# Patient Record
Sex: FEMALE | Race: OTHER | Employment: FULL TIME | URBAN - METROPOLITAN AREA
[De-identification: names, ages, dates, MRNs, and addresses within clinical notes are randomized per-mention and may not be internally consistent; named-entity substitution may affect disease eponyms.]

---

## 2017-01-23 ENCOUNTER — TELEPHONE (OUTPATIENT)
Dept: FAMILY MEDICINE CLINIC | Age: 52
End: 2017-01-23

## 2017-01-23 NOTE — TELEPHONE ENCOUNTER
Pt scheduled 2/28/17    Pt states that she has felt a little down/depressed and decided to look at the s/e of the metformin thinking that it is was coming from the medication. When she started reading the s/e she saw where it said that if a pt starts with cold arms and legs that they need to go straight to the ER for eval b/c it could be life threatening. She said that she started the metformin on 12/24 and the cold arms/legs started about 2 wks ago. She states that she is usually very warm and her arms and legs are even cold to the touch. She doesn't want to go to the ER if not needed and wanted to check with Dr Richard Rodriguez first.  Advised that I will check with Dr Richard Rodriguez and get back with her. She denies other sx's. That would be a rare and unusual side effect of the Metformin (the depression and the cold intolerance). But it would certainly be ok for her to stop it for a few weeks and see if she notices any improvement. Make sure not suicidal. She can stay off it until her f/u on 2-28-17. But if her sx worsen or persist prior to that, see me sooner     I called pt back to let her know of Dr Richard Rodriguez rec. She states that she will give it a try.   She denies being suicidal.

## 2017-02-28 ENCOUNTER — OFFICE VISIT (OUTPATIENT)
Dept: FAMILY MEDICINE CLINIC | Age: 52
End: 2017-02-28

## 2017-02-28 VITALS
RESPIRATION RATE: 18 BRPM | WEIGHT: 221.6 LBS | HEART RATE: 65 BPM | HEIGHT: 69 IN | DIASTOLIC BLOOD PRESSURE: 86 MMHG | TEMPERATURE: 98.9 F | SYSTOLIC BLOOD PRESSURE: 130 MMHG | BODY MASS INDEX: 32.82 KG/M2 | OXYGEN SATURATION: 99 %

## 2017-02-28 DIAGNOSIS — G47.30 SLEEP APNEA, UNSPECIFIED TYPE: ICD-10-CM

## 2017-02-28 DIAGNOSIS — R03.0 BORDERLINE HYPERTENSION: ICD-10-CM

## 2017-02-28 DIAGNOSIS — E78.00 HYPERCHOLESTEROLEMIA: ICD-10-CM

## 2017-02-28 DIAGNOSIS — E55.9 VITAMIN D DEFICIENCY: ICD-10-CM

## 2017-02-28 DIAGNOSIS — E11.9 DIABETES MELLITUS TYPE 2, NONINSULIN DEPENDENT (HCC): Primary | ICD-10-CM

## 2017-02-28 RX ORDER — GLUCOSAMINE SULFATE 1500 MG
2000 POWDER IN PACKET (EA) ORAL DAILY
COMMUNITY
End: 2018-03-14

## 2017-02-28 NOTE — PROGRESS NOTES
HISTORY OF PRESENT ILLNESS  Connor Ibrahim is a 47 y.o. female. HPI  Fasting follow up diabetes, cholesterol, mild hypertension and lab check. Since her last visit here, she has met w/ a dietician twice and is now on reduced sugar, low carb diet w/ increased lean protein and fiber. She has also restarted exercising regularly by walking ~ 5 x a week. Her wt is down ~ 20 lbs and already she is feeling much better. She tried taking Metformin but it made her feel extremely depressed and weak. She felt miserable. As soon as she stopped it she felt remarkably better. She has not started checking her BS yet but would be interested in doing so. Also she received a notice from the surgical center that during her recent ortho surgery she was noticed to have sleep apnea during anesthesia. It was suggested that she follow up w/ me to be referred for sleep apnea evaluation. She reports that her ex  used to tell her years ago that she snored a lot. She snores less now than she used to but still does at times. She woke herself up once snoring so loudly and awoke one day on the sofa gasping. She usually feels tired most days. Many mornings she awakens feeling tired and unrefreshed. Review of Systems   Respiratory: Negative. Cardiovascular: Negative. Gastrointestinal: Negative. Neurological: Negative. Endo/Heme/Allergies: Negative.       Problem List  Date Reviewed: 2/28/2017          Codes Class Noted    Diabetes mellitus type 2, noninsulin dependent ([facility].) ICD-10-CM: E11.9  ICD-9-CM: 250.00  12/27/2016    Overview Signed 12/27/2016  2:21 PM by Byron Melchor MD     11-28-16             Tear of medial meniscus of left knee ICD-10-CM: I52.691E  ICD-9-CM: 836.0  11/28/2016    Overview Signed 11/28/2016 10:42 AM by Byron Melchor MD     ~ Spring 2016, Ortho Dr Edwar Samano, scope scheduled 12-13-16             Mild Hypercholesterolemia with good HDL ICD-10-CM: E78.00  ICD-9-CM: 272.0 2016        Borderline hypertension ICD-10-CM: R03.0  ICD-9-CM: 796.2  2016        Screening exams for skin cancer ICD-10-CM: Z12.83  ICD-9-CM: V76.43  2016    Overview Signed 2016  2:38 PM by Brianna Carrasco MD     Derm Dr Quintana             Right carpal tunnel syndrome ICD-10-CM: G56.01  ICD-9-CM: 354.0  2015    Overview Addendum 2015 11:04 AM by Brianna Carrasco MD     ~ 2013 Dr Sharon Rao & Dr Shiloh Gallego: EMG study + Impression:  The above electrodiagnostic study reveals evidence of mildly prolonged motor and sensory distal latency of right median nerve, suggesting mild median nerve entrapment at wrist.             Vertigo ICD-10-CM: R42  ICD-9-CM: 780.4  6/3/2013        Vitamin D deficiency ICD-10-CM: E55.9  ICD-9-CM: 268.9  2012    Overview Signed 2012  1:42 PM by Brianna Carrasco MD     2012             History of low back pain, DDD, mild scoliosis and mild-moderate disc protrusions ICD-10-CM: Z87.39  ICD-9-CM: V13.59  2012        Mild-moderate lumbar disc protrusions L4-5, L5-S1, mild scoliosis ICD-10-CM: M51.26  ICD-9-CM: 722.10  2012    Overview Signed 2012  3:01 PM by Brianna Carrasco MD     2007             Impaired fasting glucose ICD-10-CM: R73.01  ICD-9-CM: 790.21  8/15/2011        Abnormal Pap smear s/p Cryotherapy ~  ICD-10-CM: RCQ2775  ICD-9-CM: TXN2659  2010    Overview Signed 2010  8:53 AM by Brianna Carrasco MD     GYN Dr Miranda Bacon (normal spontaneous vaginal delivery) ICD-10-CM: O80  ICD-9-CM: 694  Unknown    Overview Signed 3/22/2010 10:24 AM by Brianna Carrasco MD     A2,  x2, SAB x2             Achilles tendonitis ICD-10-CM: M76.60  ICD-9-CM: 726.71  3/1/2010    Overview Signed 3/1/2010 11:14 AM by Louis GERARDO>R s/p cortisone             Anxiety ICD-10-CM: F41.9  ICD-9-CM: 300.00  3/1/2010    Overview Signed 3/1/2010 11:14 AM by Louis horn H/O Abnormal Pap Smears 1980, 1998 ICD-9-CM: 796.9  3/1/2010    Overview Signed 3/1/2010 11:17 AM by Waylon Fothergill Desantis     3342'J and 1998 (no Bx)             Congenital hip dysplasia ICD-10-CM: Q65.89  ICD-9-CM: 755.63  3/1/2010    Overview Signed 3/1/2010 11:18 AM by Waylon Fothergill Desantis     Left.  Recontruction, bone grafting 1969             Mononucleosis ICD-10-CM: B27.90  ICD-9-CM: 818  9/1/2009    Overview Signed 5/8/2012  3:01 PM by Eduar Lopez MD     Diagnosed [facility], 9/2009             Strep throat ICD-10-CM: J02.0  ICD-9-CM: 034.0  9/1/2009        Scoliosis ICD-10-CM: M41.9  ICD-9-CM: 737.30  8/1/2007    Overview Signed 3/1/2010 11:15 AM by Waylon Fothergill Desantis     mild             Hip pain ICD-10-CM: M25.559  ICD-9-CM: 719.45  8/1/2007    Overview Signed 3/1/2010 11:15 AM by Waylon Fothergill Desantis     right             S/P Surgery for Right Lateral Epicondylitis, 2005 ICD-10-CM: [id number]  ICD-9-CM: 726.32  1/1/2005    Overview Addendum 5/8/2012  3:03 PM by Eduar Lopez MD     TOA             S/P LASIK surgery ICD-10-CM: X76.726  ICD-9-CM: V45.69  1/1/2005        GERD (gastroesophageal reflux disease) ICD-10-CM: K21.9  ICD-9-CM: 530.81  1/1/2003    Overview Addendum 5/8/2012  3:01 PM by Eduar Lopez MD     [place]                 Past Surgical History:   Procedure Laterality Date    CRYOCAUTERY OF CERVIX  ~11/2008    Dr Mary Marquez    right 3rd finger tendon rupture repair    HX COLONOSCOPY  4/19/2016, Dr Marina Michele    Polyp: tubular adenoma, repeat 3 yrs    HX KNEE ARTHROSCOPY Left 12/13/2016    related to torn meniscus    HX PARTIAL HYSTERECTOMY  4/11/13    Dr Holley Daniel    congenital hip dysplasia, left hip reconstruction and bone grafting    REPAIR ACHILLES TENDON,PRIMARY  10/09    R Achilles; Dr. Ihsan Bolaños  2005 right lateral epicondylitis surgery; TOA       OB History      Para Term  AB TAB SAB Ectopic Multiple Living    4 2   2  2           Obstetric Comments     x 2; SAB x 2; Gyn Dr Aime Quintana        Current Outpatient Prescriptions   Medication Sig    cholecalciferol (VITAMIN D3) 1,000 unit cap Take 2,000 Units by mouth daily. Indications: takes 2 tabs QD    polyethylene glycol (MIRALAX) 17 gram/dose powder MIX AND DRINK 17G BY MOUTH DAILY     No current facility-administered medications for this visit.       Allergies   Allergen Reactions    Metformin Other (comments)     Severe depression and coldness in arms    Prednisone Other (comments)     SOB, flushed, red rash     Social History     Social History    Marital status:      Spouse name: N/A    Number of children: 2    Years of education: N/A     Occupational History    Speech Pathologist for [employer]      Social History Main Topics    Smoking status: Never Smoker    Smokeless tobacco: Never Used    Alcohol use 0.0 oz/week     0 Standard drinks or equivalent per week      Comment: maybe 2-3 drinks of wine/cocktails per year    Drug use: No    Sexual activity: Yes     Partners: Male     Birth control/ protection: Surgical      Comment: Hysterectomy      Other Topics Concern    Caffeine Concern No     1 cup of coffee a day maybe 4-5 days a week    Weight Concern Yes     happy her wt is coming down since better diet and resuming regular exercise: down from 244 to 221    Special Diet Yes     met w/ dietician twice since : doing high protein, low fat, increased fiber and veggies, low carbs, reduced sugars    Exercise Yes     5 x a week: walks 1-2 hrs     Social History Narrative    Native of [place]; moved [place] [facility] to [place] ;  ~; Got re- 2012    Recalls getting part of her Hepatitis B series in the , not sure if she got #3     Family History   Problem Relation Age of Onset    Arrhythmia Mother     Bipolar Disorder Mother     Cancer Mother      cervical dx in her late 29's   [facility] High Cholesterol Mother     Heart Disease Mother      heart arrhytmia    Other Mother      brain tumor diagnosed at age 67, stable    Diabetes Father      obesity    Arthritis-osteo Father      ? RA as well    Cancer Father      skin cancer    Gout Father     Heart Disease Maternal Grandmother      CHF in her 63's    Heart Disease Maternal Grandfather       81yo    Hypertension Maternal Grandfather     Diabetes Paternal Grandmother     Stroke Paternal Grandmother       in her [de-identified]    Arthritis-rheumatoid Paternal Grandmother     Cancer Paternal Grandfather      liver and colon CA,  mid 66's    Diabetes Paternal Grandfather     Depression Sister      2 yrs younger than pt    Anxiety Sister     Other Sister      scoliosis    Other Daughter      Tourettes, tremor, tics, milk/soy allergy, lactose intolerance    Other Daughter      Tourettes, anxiety    Breast Cancer Paternal Aunt      dx in her 29's    Diabetes Paternal Aunt      type 2    Diabetes Paternal Uncle      type 2    Arthritis-rheumatoid Paternal Aunt     Gout Paternal Aunt      Visit Vitals    /86 (BP 1 Location: Left arm, BP Patient Position: Sitting)    Pulse 65    Temp 98.9 °F (37.2 °C) (Oral)    Resp 18    Ht 5' 8.5\" (1.74 m)    Wt 221 lb 9.6 oz (100.5 kg)    LMP 2013    SpO2 99%    BMI 33.2 kg/m2     Physical Exam   Constitutional: No distress. HENT:   Mouth/Throat: Uvula is midline and oropharynx is clear and moist. No uvula swelling. No oropharyngeal exudate. Neck: Neck supple. Cardiovascular: Normal rate, regular rhythm and normal heart sounds. No murmur heard. Pulmonary/Chest: Effort normal and breath sounds normal. No respiratory distress. Abdominal: Soft. Bowel sounds are normal. She exhibits no distension and no mass. There is no tenderness.    Musculoskeletal: She exhibits no edema or tenderness. Neurological:   Normal monofilament testing B feet. Sensation and pulses intact. Skin intact. Vitals reviewed. ASSESSMENT and PLAN    ICD-10-CM ICD-9-CM    1. Diabetes mellitus type 2, noninsulin dependent (HCC) E11.9 250.00  DIABETES FOOT EXAM       DIABETES EYE EXAM      MICROALBUMIN, UR, RAND W/ MICROALBUMIN/CREA RATIO      METABOLIC PANEL, BASIC      HEMOGLOBIN A1C WITH EAG   2. Mild Hypercholesterolemia with good HDL E78.00 272.0 ALT      AST      LIPID PANEL   3. Borderline hypertension R03.0 796.2    4. Vitamin D deficiency E55.9 268.9 VITAMIN D, 25 HYDROXY   5. Sleep apnea, unspecified type G47.30 780.57 REFERRAL TO SLEEP STUDIES     Fasting labs today    Reviewed diet, exercise and weight control; Since her last visit here, she has met w/ a dietician twice and is now on reduced sugar, low carb diet w/ increased lean protein and fiber. She has also restarted exercising regularly by walking ~ 5 x a week. Her wt is down ~ 20 lbs and already she is feeling much better. Commended on her progress, encouraged to keep up the good work  She tried taking Metformin but it made her feel extremely depressed and weak. She felt miserable. As soon as she stopped it she felt remarkably better. But she is doing well w/ lifestyle modification on her own so far anyway. Cardiovascular risk and specific lipid/LDL/BS/HgbA1c/BP goals reviewed    Rx given for Glucometer, test strips, and lancets.  Goal BS's d/w pt    Further follow up & other recommendations pending review of labs as well

## 2017-02-28 NOTE — PROGRESS NOTES
Chief Complaint   Patient presents with    Hypertension     fasting follow up    Diabetes    Cholesterol Problem     1. Have you been to the ER, urgent care clinic since your last visit? Hospitalized since your last visit? No    2. Have you seen or consulted any other health care providers outside of the [address] [place] [address] since your last visit? Include any pap smears or colon screening.    Dr Aileen Hatch

## 2017-02-28 NOTE — PATIENT INSTRUCTIONS
Diabetes Foot Health: Care Instructions  Your Care Instructions    When you have diabetes, your feet need extra care and attention. Diabetes can damage the nerve endings and blood vessels in your feet, making you less likely to notice when your feet are injured. Diabetes also limits your body's ability to fight infection and get blood to areas that need it. If you get a minor foot injury, it could become an ulcer or a serious infection. With good foot care, you can prevent most of these problems. Caring for your feet can be quick and easy. Most of the care can be done when you are bathing or getting ready for bed. Follow-up care is a key part of your treatment and safety. Be sure to make and go to all appointments, and call your doctor if you are having problems. Its also a good idea to know your test results and keep a list of the medicines you take. How can you care for yourself at home? · Keep your blood sugar close to normal by watching what and how much you eat, monitoring blood sugar, taking medicines if prescribed, and getting regular exercise. · Do not smoke. Smoking affects blood flow and can make foot problems worse. If you need help quitting, talk to your doctor about stop-smoking programs and medicines. These can increase your chances of quitting for good. · Eat a diet that is low in fats. High fat intake can cause fat to build up in your blood vessels and decrease blood flow. · Inspect your feet daily for blisters, cuts, cracks, or sores. If you cannot see well, use a mirror or have someone help you. · Take care of your feet:  [employer] [facility] [employer] your feet every day. Use warm (not hot) water. Check the water temperature with your wrists or other part of your body, not your feet. ¨ Dry your feet well. Pat them dry. Do not rub the skin on your feet too hard. Dry well between your toes. If the skin on your feet stays moist, bacteria or a fungus can grow, which can lead to infection.   ¨ Keep your skin soft. Use moisturizing skin cream to keep the skin on your feet soft and prevent calluses and cracks. But do not put the cream between your toes, and stop using any cream that causes a rash. ¨ Clean underneath your toenails carefully. Do not use a sharp object to clean underneath your toenails. Use the blunt end of a nail file or other rounded tool. ¨ Trim and file your toenails straight across to prevent ingrown toenails. Use a nail clipper, not scissors. Use an emery board to smooth the edges. · Change socks daily. Socks without seams are best, because seams often rub the feet. You can find socks for people with diabetes from specialty catalogs. · Look inside your shoes every day for things like gravel or torn linings, which could cause blisters or sores. · Buy shoes that fit well:  ¨ Look for shoes that have plenty of space around the toes. This helps prevent bunions and blisters. ¨ Try on shoes while wearing the kind of socks you will usually wear with the shoes. ¨ Avoid plastic shoes. They may rub your feet and cause blisters. Good shoes should be made of materials that are flexible and breathable, such as leather or cloth. ¨ Break in new shoes slowly by wearing them for no more than an hour a day for several days. Take extra time to check your feet for red areas, blisters, or other problems after you wear new shoes. · Do not go barefoot. Do not wear sandals, and do not wear shoes with very thin soles. Thin soles are easy to puncture. They also do not protect your feet from hot pavement or cold weather. · Have your doctor check your feet during each visit. If you have a foot problem, see your doctor. Do not try to treat an early foot problem at home. Home remedies or treatments that you can buy without a prescription (such as corn removers) can be harmful. · Always get early treatment for foot problems. A minor irritation can lead to a major problem if not properly cared for early.   When should you call for help? Call your doctor now or seek immediate medical care if:  · You have a foot sore, an ulcer or break in the skin that is not healing after 4 days, bleeding corns or calluses, or an ingrown toenail. · You have blue or black areas, which can mean bruising or blood flow problems. · You have peeling skin or tiny blisters between your toes or cracking or oozing of the skin. · You have a fever for more than 24 hours and a foot sore. · You have new numbness or tingling in your feet that does not go away after you move your feet or change positions. · You have unexplained or unusual swelling of the foot or ankle. Watch closely for changes in your health, and be sure to contact your doctor if:  · You cannot do proper foot care. Where can you learn more? Go to http://demi-kellie.info/. Enter A739 in the search box to learn more about \"Diabetes Foot Health: Care Instructions. \"  Current as of: May 23, 2016  Content Version: 11.1  © 2374-5826 [employer]. Care instructions adapted under license by [employer] (which disclaims liability or warranty for this information). If you have questions about a medical condition or this instruction, always ask your healthcare professional. [address] any warranty or liability for your use of this information.

## 2017-03-01 LAB
25(OH)D3+25(OH)D2 SERPL-MCNC: 37.7 NG/ML (ref 30–100)
ALBUMIN/CREAT UR: <4.3 MG/G CREAT (ref 0–30)
ALT SERPL-CCNC: 17 IU/L (ref 0–32)
AST SERPL-CCNC: 17 IU/L (ref 0–40)
BUN SERPL-MCNC: 17 MG/DL (ref 6–24)
BUN/CREAT SERPL: 16 (ref 9–23)
CALCIUM SERPL-MCNC: 10.1 MG/DL (ref 8.7–10.2)
CHLORIDE SERPL-SCNC: 100 MMOL/L (ref 96–106)
CHOLEST SERPL-MCNC: 196 MG/DL (ref 100–199)
CO2 SERPL-SCNC: 24 MMOL/L (ref 18–29)
CREAT SERPL-MCNC: 1.05 MG/DL (ref 0.57–1)
CREAT UR-MCNC: 69.6 MG/DL
EST. AVERAGE GLUCOSE BLD GHB EST-MCNC: 128 MG/DL
GLUCOSE SERPL-MCNC: 100 MG/DL (ref 65–99)
HBA1C MFR BLD: 6.1 % (ref 4.8–5.6)
HDLC SERPL-MCNC: 67 MG/DL
INTERPRETATION, 910389: NORMAL
LDLC SERPL CALC-MCNC: 109 MG/DL (ref 0–99)
MICROALBUMIN UR-MCNC: <3 UG/ML
POTASSIUM SERPL-SCNC: 4.6 MMOL/L (ref 3.5–5.2)
SODIUM SERPL-SCNC: 140 MMOL/L (ref 134–144)
TRIGL SERPL-MCNC: 101 MG/DL (ref 0–149)
VLDLC SERPL CALC-MCNC: 20 MG/DL (ref 5–40)

## 2017-03-06 ENCOUNTER — HOSPITAL ENCOUNTER (OUTPATIENT)
Dept: NUTRITION | Age: 52
Discharge: HOME | End: 2017-03-06
Payer: COMMERCIAL

## 2017-03-06 PROCEDURE — 97803 MED NUTRITION INDIV SUBSEQ: CPT | Performed by: DIETITIAN, REGISTERED

## 2017-03-06 NOTE — PROGRESS NOTES
NUTRITION  DAILY TREATMENT NOTE  Patient Name: Michelle Zimmerman         Date: 3/6/2017  : 1965    YES Patient  Verified  Insurance: Payor: Alfonso Moncada / Plan: Andrew Nab / Product Type: HMO /    Diagnosis: In time:   4:00pm             Out time:   4:30pm   Total Treatment Time (min):   30     SUBJECTIVE    Medication Changes/New allergies or changes in medical history, any new surgeries or procedures? YES   Stopped Metformin   Subjective Functional Status/Changes:  []  No changes reported   Pt has stopped Metformin as of last visit and states she is feeling much better with a dramatic decrease in depressive feelings and thoughts. Recent A1C (6.1%) is lower than previous (6.4%) and cholesterol has improved with LDL (109ng/dl from 132mg/dl) and total cholesterol (196mg/dl from 231mg/dl). As discussed at last session she has increased the size of her meals to decrease the number of feedings per day and notes this has greatly increased her sense of satiety and desire to graze over the day. She continues to track food intake consistently and averages 1400-1700kcal per day with 130-170g cho. She is listening to her body more for hunger and satiety cues and is aiming for 40-50g cho per meal with protein and fibrous veggies. Discussed handling of sweets cravings, night time eating, and goal of increasing activity when weight plateaus in future. Pt expressed comprehension, high motivation, and compliance is expected.     Current Wt: 216.8 Previous Wt: 226.2 Wt Change: -9.4     OBJECTIVE    Patient Education:  [x]  Review current plan with patient   []  Other:    Handouts/  Information Provided: []  Carbohydrates  []  Protein  []  Fiber  []  Serving Sizes  []  Fluids  []  General guidelines []  Diabetes  []  Cholesterol  []  Sodium  []  SBGM  []  Food Journals  []  Others:    Estimated Needs: 1500 100 150 50    Calories Protein CHO Fat     ASSESSMENT    [x]  Pt Progressing Well  []  Slow Progress [] No Progress    Other:    []  Understand Dietary Changes/Recs []  No Change [x]  Improving []  N/A   []  Weight []  No Change [x]  Improving []  N/A     Glucose Levels []  No Change [x]  Improving []  N/A   []  Cholesterol Levels []  No Change []  Improving []  N/A     RECOMMENDATIONS    1. Night snacking: have mini complete meal (balanced plate) if staying up till 11pm/12am. Listen to your body to avoid grazing/binging at night. 2. Sweets: use pre portioned and prepackaged 100kcal or less treats; mindfulness/pressure point cravings treatment if it has worked in the past.   3. Continue the increased walking and gardening. 4.    5.       PLAN    [x]  Continue on current plan []  Follow-up PRN   []  Discharge due to :    [x]  Next Appt[de-identified] May 1 @ 4:00pm     Dietitian: Sabrina Chambers MS, RD    Date: 3/6/2017 Time: 5:29 PM

## 2017-03-27 NOTE — PROGRESS NOTES
Vit D improved and normal. Cont current regimen of supplement  Lipids and BS/HgBA1c are improving nicely and all are overall very good! Great job! Keep up the good work!   Can schedule fasting complete physical for 6months

## 2017-03-28 ENCOUNTER — TELEPHONE (OUTPATIENT)
Dept: FAMILY MEDICINE CLINIC | Age: 52
End: 2017-03-28

## 2017-03-28 NOTE — TELEPHONE ENCOUNTER
----- Message from Marlene Swann MD sent at 3/26/2017  9:01 PM EDT -----  Vit D improved and normal. Cont current regimen of supplement  Lipids and BS/HgBA1c are improving nicely and all are overall very good! Great job! Keep up the good work! Can schedule fasting complete physical for 6months    Advised patient of results and recommendations, they were transferred to schedule appt.

## 2017-03-31 ENCOUNTER — OFFICE VISIT (OUTPATIENT)
Dept: SLEEP MEDICINE | Age: 52
End: 2017-03-31

## 2017-03-31 VITALS
OXYGEN SATURATION: 98 % | BODY MASS INDEX: 31.7 KG/M2 | TEMPERATURE: 98.1 F | HEIGHT: 69 IN | WEIGHT: 214 LBS | HEART RATE: 62 BPM | SYSTOLIC BLOOD PRESSURE: 124 MMHG | DIASTOLIC BLOOD PRESSURE: 82 MMHG

## 2017-03-31 DIAGNOSIS — G47.33 OSA (OBSTRUCTIVE SLEEP APNEA): Primary | ICD-10-CM

## 2017-03-31 DIAGNOSIS — G47.00 INSOMNIA, UNSPECIFIED TYPE: ICD-10-CM

## 2017-03-31 DIAGNOSIS — G47.61 PERIODIC LIMB MOVEMENTS OF SLEEP: ICD-10-CM

## 2017-03-31 NOTE — PATIENT INSTRUCTIONS
[address] [facility] [place]. [place], [address]  Tel.  [phone]  Fax. 100 [facility] 60  [place], [address] [facility]  Tel.  [phone]  Fax. [phone] [address]  Tel.  [phone]  Fax. [phone]     Sleep Apnea: After Your Visit  Your Care Instructions  Sleep apnea occurs when you frequently stop breathing for 10 seconds or longer during sleep. It can be mild to severe, based on the number of times per hour that you stop breathing or have slowed breathing. Blocked or narrowed airways in your nose, mouth, or throat can cause sleep apnea. Your airway can become blocked when your throat muscles and tongue relax during sleep. Sleep apnea is common, occurring in 1 out of 20 individuals. Individuals having any of the following characteristics should be evaluated and treated right away due to high risk and detrimental consequences from untreated sleep apnea:  1. Obesity  2. Congestive Heart failure  3. Atrial Fibrillation  4. Uncontrolled Hypertension  5. Type II Diabetes  6. Night-time Arrhythmias  7. Stroke  8. Pulmonary Hypertension  9. High-risk Driving Populations (pilots, truck drivers, etc.)  10. Patients Considering Weight-loss Surgery    How do you know you have sleep apnea? You probably have sleep apnea if you answer 'yes' to 3 or more of the following questions:  S - Have you been told that you Snore? T - Are you often Tired during the day? O - Has anyone Observed you stop breathing while sleeping? P- Do you have (or are being treated for) high blood Pressure? B - Are you obese (Body Mass Index > 35)? A - Is your Age 48years old or older? N - Is your Neck size greater than 16 inches? G - Are you male Gender? A sleep physician can prescribe a breathing device that prevents tissues in the throat from blocking your airway.  Or your doctor may recommend using a dental device (oral breathing device) to help keep your airway open. In some cases, surgery may be needed to remove enlarged tissues in the throat. Follow-up care is a key part of your treatment and safety. Be sure to make and go to all appointments, and call your doctor if you are having problems. It's also a good idea to know your test results and keep a list of the medicines you take. How can you care for yourself at home? · Lose weight, if needed. It may reduce the number of times you stop breathing or have slowed breathing. · Go to bed at the same time every night. · Sleep on your side. It may stop mild apnea. If you tend to roll onto your back, sew a pocket in the back of your pajama top. Put a tennis ball into the pocket, and stitch the pocket shut. This will help keep you from sleeping on your back. · Avoid alcohol and medicines such as sleeping pills and sedatives before bed. · Do not smoke. Smoking can make sleep apnea worse. If you need help quitting, talk to your doctor about stop-smoking programs and medicines. These can increase your chances of quitting for good. · Prop up the head of your bed 4 to 6 inches by putting bricks under the legs of the bed. · Treat breathing problems, such as a stuffy nose, caused by a cold or allergies. · Use a continuous positive airway pressure (CPAP) breathing machine if lifestyle changes do not help your apnea and your doctor recommends it. The machine keeps your airway from closing when you sleep. · If CPAP does not help you, ask your doctor whether you should try other breathing machines. A bilevel positive airway pressure machine has two types of air pressureâone for breathing in and one for breathing out. Another device raises or lowers air pressure as needed while you breathe. · If your nose feels dry or bleeds when using one of these machines, talk with your doctor about increasing moisture in the air. A humidifier may help.   · If your nose is runny or stuffy from using a breathing machine, talk with your doctor about using decongestants or a corticosteroid nasal spray. When should you call for help? Watch closely for changes in your health, and be sure to contact your doctor if:  · You still have sleep apnea even though you have made lifestyle changes. · You are thinking of trying a device such as CPAP. · You are having problems using a CPAP or similar machine. Where can you learn more? Go to [employer]. Enter I847 in the search box to learn more about \"Sleep Apnea: After Your Visit. \"   © 0339-5157 Healthwise, Incorporated. Care instructions adapted under license by formerly [facility] [employer] (which disclaims liability or warranty for this information). This care instruction is for use with your licensed healthcare professional. If you have questions about a medical condition or this instruction, always ask your healthcare professional. Junior Nieveses any warranty or liability for your use of this information. PROPER SLEEP HYGIENE    What to avoid  · Do not have drinks with caffeine, such as coffee or black tea, for 8 hours before bed. · Do not smoke or use other types of tobacco near bedtime. Nicotine is a stimulant and can keep you awake. · Avoid drinking alcohol late in the evening, because it can cause you to wake in the middle of the night. · Do not eat a big meal close to bedtime. If you are hungry, eat a light snack. · Do not drink a lot of water close to bedtime, because the need to urinate may wake you up during the night. · Do not read or watch TV in bed. Use the bed only for sleeping and sexual activity. What to try  · Go to bed at the same time every night, and wake up at the same time every morning. Do not take naps during the day. · Keep your bedroom quiet, dark, and cool. · Get regular exercise, but not within 3 to 4 hours of your bedtime. .  · Sleep on a comfortable pillow and mattress.   · If watching the clock makes you anxious, turn it facing away from you so you cannot see the time. · If you worry when you lie down, start a worry book. Well before bedtime, write down your worries, and then set the book and your concerns aside. · Try meditation or other relaxation techniques before you go to bed. · If you cannot fall asleep, get up and go to another room until you feel sleepy. Do something relaxing. Repeat your bedtime routine before you go to bed again. · Make your house quiet and calm about an hour before bedtime. Turn down the lights, turn off the TV, log off the computer, and turn down the volume on music. This can help you relax after a busy day. Drowsy Driving  The [address] [place] [address] Road Traffic Safety Administration cites drowsiness as a causing factor in more than 251,458 police reported crashes annually, resulting in 76,000 injuries and 1,500 deaths. Other surveys suggest 55% of people polled have driven while drowsy in the past year, 23% had fallen asleep but not crashed, 3% crashed, and 2% had and accident due to drowsy driving. Who is at risk? Young Drivers: One study of drowsy driving accidents states that 55% of the drivers were under 25 years. Of those, 75% were male. Shift Workers and Travelers: People who work overnight or travel across time zones frequently are at higher risk of experiencing Circadian Rhythm Disorders. They are trying to work and function when their body is programed to sleep. Sleep Deprived: Lack of sleep has a serious impact on your ability to pay attention or focus on a task. Consistently getting less than the average of 8 hours your body needs creates partial or cumulative sleep deprivation. Untreated Sleep Disorders: Sleep Apnea, Narcolepsy, R.L.S., and other sleep disorders (untreated) prevent a person from getting enough restful sleep. This leads to excessive daytime sleepiness and increases the risk for drowsy driving accidents by up to 7 times.   Medications / Alcohol: Even over the counter medications can cause drowsiness. Medications that impair a drivers attention should have a warning label. Alcohol naturally makes you sleepy and on its own can cause accidents. Combined with excessive drowsiness its effects are amplified. Signs of Drowsy Driving:   * You don't remember driving the last few miles   * You may drift out of your suhail   * You are unable to focus and your thoughts wander   * You may yawn more often than normal   * You have difficulty keeping your eyes open / nodding off   * Missing traffic signs, speeding, or tailgating  Prevention-   Good sleep hygiene, lifestyle and behavioral choices have the most impact on drowsy driving. There is no substitute for sleep and the average person requires 8 hours nightly. If you find yourself driving drowsy, stop and sleep. Consider the sleep hygiene tips provided during your visit as well. Medication Refill Policy: Refills for all medications require 1 week advance notice. Please have your pharmacy fax a refill request. We are unable to fax, or call in \"controled substance\" medications and you will need to pick these prescriptions up from our office. [employer] Activation    Thank you for requesting access to [employer]. Please follow the instructions below to securely access and download your online medical record. [employer] allows you to send messages to your doctor, view your test results, renew your prescriptions, schedule appointments, and more. How Do I Sign Up? 1. In your internet browser, go to [email or web address]. 2. Click on the First Time User? Click Here link in the Sign In box. You will see the New Member Sign Up page. 3. Enter your [employer] Access Code exactly as it appears below. You will not need to use this code after youve completed the sign-up process. If you do not sign up before the expiration date, you must request a new code. [employer] Access Code:  Activation code not generated  Current [employer] Status: Active (This is the date your [employer] access code will )    4. Enter the last four digits of your Social Security Number (xxxx) and Date of Birth (mm/dd/yyyy) as indicated and click Submit. You will be taken to the next sign-up page. 5. Create a [employer] Inct ID. This will be your [employer] login ID and cannot be changed, so think of one that is secure and easy to remember. 6. Create a [employer] password. You can change your password at any time. 7. Enter your Password Reset Question and Answer. This can be used at a later time if you forget your password. 8. Enter your e-mail address. You will receive e-mail notification when new information is available in [address] 9. Click Sign Up. You can now view and download portions of your medical record. 10. Click the Download Summary menu link to download a portable copy of your medical information. Additional Information    If you have questions, please call [phone]. Remember, [employer] is NOT to be used for urgent needs. For medical emergencies, dial 911.

## 2017-03-31 NOTE — PROGRESS NOTES
[address] [facility]., [place]. [place], [address]  Tel.  [phone]  Fax. 100 [facility] 60  [place], [address] [facility]  Tel.  [phone]  Fax. [phone] [address]   Tel.  [phone]  Fax. [phone]         Subjective:      Heber Chiu is an 47 y.o. female referred for evaluation for a sleep disorder. She complains of snoring associated with periods of not breathing witnessed following surgery / anesthesia. Symptoms began several years ago, unchanged since that time. She usually can fall asleep in <5 minutes. Family or house members note snoring and leg twitches. She denies completely or partially paralyzed while falling asleep or waking up. Heber Chiu does wake up frequently at night - attributes this to stress related to her work. She is bothered by waking up too early and left unable to get back to sleep. She actually sleeps about 9 hours at night and wakes up about 3 times during the night. She does not work shifts: Rene Madison indicates she does (week days) get too little sleep at night. Her bedtime is 2330. She awakens at 0600 (get up between 7a-8a). She does not take naps. She has the following observed behaviors: Loud snoring, Light snoring, Twitching of legs or feet - she does report of pain radiating from her spine / hip all the way down her legs leading to an awakening with a leg twitch. Other remarks: waking w/ a gasp or snort    Amenia Sleepiness Score: 6 which reflect mild daytime drowsiness. Allergies   Allergen Reactions    Metformin Other (comments)     Severe depression and coldness in arms    Bleach (Sodium Hypochlorite) Other (comments)    Prednisone Other (comments)     SOB, flushed, red rash         Current Outpatient Prescriptions:     MULTIVITAMIN (VITAMIN A DAY PO), Take  by mouth., Disp: , Rfl:     cholecalciferol (VITAMIN D3) 1,000 unit cap, Take 2,000 Units by mouth daily.  Indications: takes 2 tabs QD, Disp: , Rfl:     polyethylene glycol (MIRALAX) 17 gram/dose powder, MIX AND DRINK 17G BY MOUTH DAILY, Disp: 527 g, Rfl: 11     She  has a past medical history of Abnormal Pap smear s/p Cryotherapy ~  (2010); Achilles tendonitis; Anxiety (); Borderline Glucose Intolerance (3/1/2010); Borderline high cholesterol; Borderline hypertension (2016); Congenital hip dysplasia (); Diabetes mellitus type 2, noninsulin dependent ([facility] [address] (2016); Epicondylitis (2005); GERD (gastroesophageal reflux disease) (); Glucose intolerance; Hip pain (2007); History of low back pain, DDD, mild scoliosis and mild-moderate disc protrusions (2012); Impaired fasting glucose (8/15/2011); Meniscus tear (2016); Mild Hypercholesterolemia with good HDL (2016); Mild-moderate lumbar disc protrusions L4-5, L5-S1, mild scoliosis (2012); Mononucleosis (2009);  (normal spontaneous vaginal delivery); Pap smear abnormality (, ); Right carpal tunnel syndrome (2015); S/P LASIK surgery (2005); S/P Surgery for Right Lateral Epicondylitis,  (2005); Scoliosis (2007); Screening exams for skin cancer (2016); Strep throat (2009); Tendon rupture (1997); and Vitamin D deficiency (2012). She  has a past surgical history that includes repair achilles tendon,primary (10/09); cryocautery of cervix (~2008); pelvis/hip joint surgery unlisted (); hand/finger surgery unlisted (); upper arm/elbow surgery unlisted (); refractive surgery (); partial hysterectomy (13); colonoscopy (2016, Dr Mcbride President); and knee arthroscopy (Left, 2016). She family history includes Anxiety in her sister; Arrhythmia in her mother; Rosilyn Bilis in her father; Arthritis-rheumatoid in her paternal aunt and paternal grandmother; Bipolar Disorder in her mother; Breast Cancer in her paternal aunt;  Cancer in her father, mother, and paternal grandfather; Depression in her sister; Diabetes in her father, paternal aunt, paternal grandfather, paternal grandmother, and paternal uncle; Gout in her father and paternal aunt; Heart Disease in her maternal grandfather, maternal grandmother, and mother; High Cholesterol in her mother; Hypertension in her maternal grandfather; Other in her daughter, daughter, mother, and sister; Stroke in her paternal grandmother. She  reports that she has never smoked. She has never used smokeless tobacco. She reports that she drinks alcohol. She reports that she does not use illicit drugs. Review of Systems:  Constitutional:  No significant weight loss or weight gain. Eyes:  No blurred vision. CVS:  No significant chest pain  Pulm:  No significant shortness of breath  GI:  No significant nausea or vomiting  :  No significant nocturia  Musculoskeletal:  No significant joint pain at night  Skin:  No significant rashes  Neuro:  No significant dizziness   Psych:  No active mood issues    Sleep Review of Systems: notable for no difficulty falling asleep; infrequent awakenings at night;  regular dreaming noted; no nightmares ; no early morning headaches; no memory problems; no concentration issues; no history of any automobile or occupational accidents due to daytime drowsiness. Objective:     Visit Vitals    /82    Pulse 62    Temp 98.1 °F (36.7 °C)    Ht 5' 8.5\" (1.74 m)    Wt 214 lb (97.1 kg)    LMP 03/16/2013    SpO2 98%    BMI 32.07 kg/m2         General:   Not in acute distress   Eyes:  Anicteric sclerae, no obvious strabismus   Nose:  No obvious nasal septum deviation    Oropharynx:   Class 4 oropharyngeal outlet, thick tongue base, uvula could not be seen due to low-lying soft palate, narrow tonsilo-pharyngeal pilars   Tonsils:   tonsils are not seen due to low-lying soft palate   Neck:   Neck circ.  in \"inches\": 12.5; midline trachea   Chest/Lungs:  Equal lung expansion, clear on auscultation CVS:  Normal rate, regular rhythm; no JVD   Skin:  Warm to touch; no obvious rashes   Neuro:  No focal deficits ; no obvious tremor    Psych:  Normal affect,  normal countenance;          Assessment:       ICD-10-CM ICD-9-CM    1. WANDA (obstructive sleep apnea) G47.33 327.23 POLYSOMNOGRAPHY 1 NIGHT   2. Periodic limb movements of sleep G47.61 327.51 POLYSOMNOGRAPHY 1 NIGHT   3. Insomnia, unspecified type G47.00 780.52    4. BMI 32.0-32.9,adult Z68.32 V85.32          Plan:     * The patient currently has a Low Risk for having sleep apnea. STOP-BANG score 3.  * Sleep testing was ordered for initial evaluation. * She was provided information on sleep apnea including coresponding risk factors and the importance of proper treatment. * Treatment options if indicated were reviewed today. Patient agrees to a trial of OAT therapy if indicated. * Counseling was provided regarding proper sleep hygiene (including effect of light on sleep), paradoxical intention, stimulus control, sleep environment safety and safe driving. * Effect of sleep disturbance on weight was reviewed. We have recommended a dedicated weight loss through appropriate diet and an exercise regiment as significant weight reduction has been shown to reduce severity of obstructive sleep apnea. * Telephone [phone]  follow-up shortly after sleep study to review results and plan final management.     (patient has given permission for a message to be left regarding test results and further management if patient cannot be cannot be reached directly). Thank you for allowing us to participate in your patient's medical care. We'll keep you updated on these investigations. Debbie Marsh MD, FAASM  Diplomate American Board of Sleep Medicine  Diplomate in Sleep Medicine - ABP  Electronically signed.

## 2017-05-06 LAB — MAMMOGRAPHY, EXTERNAL: NORMAL

## 2017-06-12 ENCOUNTER — HOSPITAL ENCOUNTER (OUTPATIENT)
Dept: NUTRITION | Age: 52
Discharge: HOME | End: 2017-06-12
Payer: COMMERCIAL

## 2017-06-12 PROCEDURE — 97803 MED NUTRITION INDIV SUBSEQ: CPT | Performed by: DIETITIAN, REGISTERED

## 2017-06-12 NOTE — PROGRESS NOTES
NUTRITION  DAILY TREATMENT NOTE  Patient Name: Conner Roberts         Date: 2017  : 1965    YES Patient  Verified  Insurance: Payor: Russell Sportsman / Plan: Carmelo Mood / Product Type: HMO /    Diagnosis: In time:   4:00pm             Out time:   4:30pm   Total Treatment Time (min):   30     SUBJECTIVE    Medication Changes/New allergies or changes in medical history, any new surgeries or procedures? NO    If yes, update Summary List   Subjective Functional Status/Changes:  []  No changes reported     Pt has been less strict on 1500kcal/day diet plan past 2 months which has increased her overall mood and attitude. By doing this she has been able to instead focus on weight maintenance instead of feeling discouraged by lack of weight loss. She has been weighing herself less often and not writing it down which puts less pressure on her for the day. She continues to be active each day with walking and or the gym. she continues to keep food log and notes 1600-2000kcal per day intake. carbohdyrate intake has also increased but she will begin checking her SMBG next month to use better feedback on how her food choices are effecting her. Provided emotional support for goals and food choices. She will be on vacation next month and will check in when she returns. Pt expressed comprehension, high motivation, and compliance is expected.        Current Wt: 211.2 Previous Wt: 209.8 Wt Change: +1.4     OBJECTIVE    Patient Education:  [x]  Review current plan with patient   []  Other:    Handouts/  Information Provided: []  Carbohydrates  []  Protein  []  Fiber  []  Serving Sizes  []  Fluids  []  General guidelines []  Diabetes  []  Cholesterol  []  Sodium  []  SBGM  []  Food Journals  []  Others:    Estimated Needs: [phone] [id number]    Calories Protein CHO Fat     ASSESSMENT    []  Pt Progressing Well  [x]  Slow Progress []  No Progress    Other:    []  Understand Dietary Changes/Recs []  No Change [x] Improving []  N/A   []  Weight [x]  No Change []  Improving []  N/A     Glucose Levels []  No Change []  Improving []  N/A   []  Cholesterol Levels []  No Change []  Improving []  N/A     RECOMMENDATIONS    1. Start testing blood sugar at home (fasted and 4 hours after meals)   2. Stay active. Each mile = about 100kcal   3. If you weigh yourself at home, do not write it down    4.    5.       PLAN    [x]  Continue on current plan []  Follow-up PRN   []  Discharge due to :    [x]  Next Appt[de-identified] July 24 @ 5:00pm     Dietitian: Brad Dodge MS, RD    Date: 6/12/2017 Time: 4:57 PM

## 2017-07-24 ENCOUNTER — HOSPITAL ENCOUNTER (OUTPATIENT)
Dept: NUTRITION | Age: 52
Discharge: HOME | End: 2017-07-24
Payer: COMMERCIAL

## 2017-07-24 PROCEDURE — 97803 MED NUTRITION INDIV SUBSEQ: CPT | Performed by: DIETITIAN, REGISTERED

## 2017-07-24 NOTE — PROGRESS NOTES
NUTRITION  DAILY TREATMENT NOTE  Patient Name: Shivani Box         Date: 2017  : 1965    YES Patient  Verified  Insurance: Payor: Matthew Rice / Plan: Claudia Beltran / Product Type: HMO /    Diagnosis: In time:   5:00pm             Out time:   5:30pm   Total Treatment Time (min):   30     SUBJECTIVE    Medication Changes/New allergies or changes in medical history, any new surgeries or procedures? NO    If yes, update Summary List   Subjective Functional Status/Changes:  []  No changes reported     Pt has been checking blood sugars every morning, and 2 or 4 hours after meals to better understand her last A1C. She notes the mornings are 107-120mg/dl but then 4hrs after meals are typically <100mg/dl. Her caloric intake has been higher the past month averaging 2000-2100kcal per day with less activity due to heat. She is concerned for her weight increase and wants to maintain her current weight as she has always lost and regained. Pt struggles with constant hunger and does not feel she is able to further restrict her food intake to meet her goals. Encouraged pt to increase activity however she can to offset her meal choices. Pt expressed comprehension, high motivation, and compliance is expected.       Current Wt: 212.8 Previous Wt: 211.2 Wt Change: +1.6     OBJECTIVE    Patient Education:  [x]  Review current plan with patient   []  Other:    Handouts/  Information Provided: []  Carbohydrates  []  Protein  []  Fiber  []  Serving Sizes  []  Fluids  []  General guidelines []  Diabetes  []  Cholesterol  []  Sodium  []  SBGM  []  Food Journals  []  Others:    Estimated Needs: [id number] [phone] [id number]    Calories Protein CHO Fat     ASSESSMENT    []  Pt Progressing Well  [x]  Slow Progress []  No Progress    Other:    []  Understand Dietary Changes/Recs []  No Change [x]  Improving []  N/A   []  Weight [x]  No Change []  Improving []  N/A     Glucose Levels []  No Change [x]  Improving []  N/A   [] Cholesterol Levels []  No Change []  Improving []  N/A     RECOMMENDATIONS    1. Have watermellon earlier in the day instead of late at night   2. Walk more at night to increase activity while cool   3. Drink water consistently during the day to avoid dehydration   4.    5.       PLAN    [x]  Continue on current plan []  Follow-up PRN   []  Discharge due to :    [x]  Next Appt[de-identified] Sept 18 @ 4:00pm     Dietitian: Eldonna Sandifer, MS, RD    Date: 7/24/2017 Time: 5:56 PM

## 2017-09-13 ENCOUNTER — OFFICE VISIT (OUTPATIENT)
Dept: FAMILY MEDICINE CLINIC | Age: 52
End: 2017-09-13

## 2017-09-13 VITALS
WEIGHT: 212.6 LBS | RESPIRATION RATE: 18 BRPM | HEART RATE: 63 BPM | OXYGEN SATURATION: 98 % | SYSTOLIC BLOOD PRESSURE: 114 MMHG | DIASTOLIC BLOOD PRESSURE: 80 MMHG | BODY MASS INDEX: 31.49 KG/M2 | TEMPERATURE: 98.8 F | HEIGHT: 69 IN

## 2017-09-13 DIAGNOSIS — F41.9 ANXIETY: ICD-10-CM

## 2017-09-13 DIAGNOSIS — Z87.39 HISTORY OF LOW BACK PAIN: ICD-10-CM

## 2017-09-13 DIAGNOSIS — E11.9 DIABETES MELLITUS TYPE 2, NONINSULIN DEPENDENT (HCC): ICD-10-CM

## 2017-09-13 DIAGNOSIS — E78.00 HYPERCHOLESTEROLEMIA: ICD-10-CM

## 2017-09-13 DIAGNOSIS — M54.31 RIGHT SIDED SCIATICA: ICD-10-CM

## 2017-09-13 DIAGNOSIS — Z00.00 ROUTINE GENERAL MEDICAL EXAMINATION AT A HEALTH CARE FACILITY: Primary | ICD-10-CM

## 2017-09-13 NOTE — PROGRESS NOTES
Chief Complaint   Patient presents with    Complete Physical     fasting - has gyn for well woman     1. Have you been to the ER, urgent care clinic since your last visit? Hospitalized since your last visit? No    2. Have you seen or consulted any other health care providers outside of the [address] [place] [address] since your last visit? Include any pap smears or colon screening.  No

## 2017-09-13 NOTE — PATIENT INSTRUCTIONS
Learning About Type 2 Diabetes  What is type 2 diabetes? Insulin is a hormone that helps your body use sugar from your food as energy. Type 2 diabetes happens when your body can't use insulin the right way. Over time, the pancreas can't make enough insulin. If you don't have enough insulin, too much sugar stays in your blood. If you are overweight, get little or no exercise, or have type 2 diabetes in your family, you are more likely to have problems with the way insulin works in your body.  Americans, Hispanics, Native Americans,  Americans, and Pacific Islanders have a higher risk for type 2 diabetes. Type 2 diabetes can be prevented or delayed with a healthy lifestyle, which includes staying at a healthy weight, making smart food choices, and getting regular exercise. What can you expect with type 2 diabetes? Stacie [place] keep hearing about how important it is to keep your blood sugar within a target range. That's because over time, high blood sugar can lead to serious problems. It can:  · Harm your eyes, nerves, and kidneys. · Damage your blood vessels, leading to heart disease and stroke. · Reduce blood flow and cause nerve damage to parts of your body, especially your feet. This can cause slow healing and pain when you walk. · Make your immune system weak and less able to fight infections. When people hear the word \"diabetes,\" they often think of problems like these. But daily care and treatment can help prevent or delay these problems. The goal is to keep your blood sugar in a target range. That's the best way to reduce your chance of having more problems from diabetes. What are the symptoms? Some people who have type 2 diabetes may not have any symptoms early on. Many people with the disease don't even know they have it at first. But with time, diabetes starts to cause symptoms. You experience most symptoms of type 2 diabetes when your blood sugar is either too high or too low.   The most common symptoms of high blood sugar include:  · Thirst.  · Frequent urination. · Weight loss. · Blurry vision. The symptoms of low blood sugar include:  · Sweating. · Shakiness. · Weakness. · Hunger. · Confusion. How can you prevent type 2 diabetes? The best way to prevent or delay type 2 diabetes is to adopt healthy habits, which include:  · Staying at a healthy weight. · Exercising regularly. · Eating healthy foods. How is type 2 diabetes treated? If you have type 2 diabetes, here are the most important things you can do. · Take your diabetes medicines. · Check your blood sugar as often as your doctor recommends. Also, get a hemoglobin A1c test at least every 6 months. · Try to eat a variety of foods and to spread carbohydrate throughout the day. Carbohydrate raises blood sugar higher and more quickly than any other nutrient does. Carbohydrate is found in sugar, breads and cereals, fruit, starchy vegetables such as potatoes and corn, and milk and yogurt. · Get at least 30 minutes of exercise on most days of the week. Walking is a good choice. You also may want to do other activities, such as running, swimming, cycling, or playing tennis or team sports. If your doctor says it's okay, do muscle-strengthening exercises at least 2 times a week. · See your doctor for checkups and tests on a regular schedule. · If you have high blood pressure or high cholesterol, take the medicines as prescribed by your doctor. · Do not smoke. Smoking can make health problems worse. This includes problems you might have with type 2 diabetes. If you need help quitting, talk to your doctor about stop-smoking programs and medicines. These can increase your chances of quitting for good. Follow-up care is a key part of your treatment and safety. Be sure to make and go to all appointments, and call your doctor if you are having problems.  It's also a good idea to know your test results and keep a list of the medicines you take. Where can you learn more? Go to http://demi-kellie.info/. Enter F774 in the search box to learn more about \"Learning About Type 2 Diabetes. \"  Current as of: March 13, 2017  Content Version: 11.3  © 0105-7943 [employer]. Care instructions adapted under license by [employer] (which disclaims liability or warranty for this information). If you have questions about a medical condition or this instruction, always ask your healthcare professional. [address] any warranty or liability for your use of this information.

## 2017-09-13 NOTE — PROGRESS NOTES
HISTORY OF PRESENT ILLNESS   HPI  Annual CPE, fasting and follow up diabetes, cholesterol and some concerns as outlined in ROS below. Diagnosed TYpe 2 DM in  and I referred her to dietician. She started meeting w/ a dietician  and since then she has really cut back on her portions, sugars, carbs and increased lean protein. She continues to exercise regularly now  Her wt is down 30 lbs but she is struggling to maintain it. She still craves carbs/sweets that she loves. She feels like her cravings are getting to be out of control and she gets obsessed w/ it. Her dietician suggested she meet w/ a counselor to help w/ this. Since July she has been checking her BS's. Initially was checking it every day the first month, since then only checks a few x a month. Fasting in the AM runs 105-110  2 hours after meals runs   Pre meals        REVIEW OF SYMPTOMS     Review of Systems   Constitutional: Negative. HENT: Negative. Eyes: Negative. Has not seen an eye doctor for a few years. Respiratory: Negative. Cardiovascular: Negative. Gastrointestinal: Negative. Genitourinary: Negative. Musculoskeletal:        Starting to have problems w/ right sciatica again like she did back in ~ 2007. She was seen by a specialist but cant recall who. Had MRI done and told she has disc bulges. She underwent \"decompression\" treatment which really helped and had been doing pretty well til now. Now gets intermittent deep/aching/shooting pains from right low back into buttocks and back of right leg. Worse at night, prolonged sitting or after a lot of gardening. Skin: Negative. Neurological: Negative for dizziness, focal weakness and headaches. She denies any weakness or significant paresthesias of RLE even though the sciatica pains are shooting. Her legs get a bit restless and fidgety at night or when sitting too long.  Has RLS but not bothering her enough that she wants to do anything about it right now   Psychiatric/Behavioral: Negative for depression. The patient is nervous/anxious. Reports h/o anxiety on and off for most of her lifetime but feels like as she gets older her \"nervousness\" gets worse. At times her mind races and she has irrational thoughts such as not performing well at work or being nervous/paranoid around other people. She feels totally calm and relaxed when around her daughter and grandbaby.  Not interested in meds, but wants to see a counselor about this.            PROBLEM LIST/MEDICAL HISTORY      Problem List  Date Reviewed: 9/13/2017          Codes Class Noted    Diabetes mellitus type 2, noninsulin dependent ([facility] [address] ICD-10-CM: E11.9  ICD-9-CM: 250.00  12/27/2016    Overview Signed 12/27/2016  2:21 PM by [address], MD     11-28-16             Tear of medial meniscus of left knee ICD-10-CM: Q56.974U  ICD-9-CM: 836.0  11/28/2016    Overview Signed 11/28/2016 10:42 AM by [address], MD     ~ Spring 2016, Ortho Dr Kimberlee Nation, scope scheduled 12-13-16             Mild Hypercholesterolemia with good HDL ICD-10-CM: E78.00  ICD-9-CM: 272.0  11/28/2016        Borderline hypertension ICD-10-CM: R03.0  ICD-9-CM: 796.2  11/28/2016        Screening exams for skin cancer ICD-10-CM: Z12.83  ICD-9-CM: V76.43  5/5/2016    Overview Signed 5/5/2016  2:38 PM by [address], MD     Derm Dr Quintana             Right carpal tunnel syndrome ICD-10-CM: G56.01  ICD-9-CM: 354.0  5/8/2015    Overview Addendum 5/8/2015 11:04 AM by [address], MD     ~ 6/2013 Dr Marguerite Macias: EMG study + Impression:  The above electrodiagnostic study reveals evidence of mildly prolonged motor and sensory distal latency of right median nerve, suggesting mild median nerve entrapment at wrist.             Vertigo ICD-10-CM: R42  ICD-9-CM: 780.4  6/3/2013        Vitamin D deficiency ICD-10-CM: E55.9  ICD-9-CM: 268.9  5/18/2012    Overview Signed 5/18/2012  1:42 PM by Cristina Beltrán MD     2012             History of low back pain, DDD, mild scoliosis and mild-moderate disc protrusions ICD-10-CM: Z87.39  ICD-9-CM: V13.59  2012        Mild-moderate lumbar disc protrusions L4-5, L5-S1, mild scoliosis ICD-10-CM: M51.26  ICD-9-CM: 722.10  2012    Overview Signed 2012  3:01 PM by Cristina Beltrán MD     2007             Impaired fasting glucose ICD-10-CM: R73.01  ICD-9-CM: 790.21  8/15/2011        Abnormal Pap smear s/p Cryotherapy ~  ICD-10-CM: JCS1038  ICD-9-CM: Reuben Munch  2010    Overview Signed 2010  8:53 AM by Cristina Beltrán MD     GYN Dr Mira Gallagher (normal spontaneous vaginal delivery) ICD-10-CM: O80  ICD-9-CM: 417  Unknown    Overview Signed 3/22/2010 10:24 AM by Cristina Beltrán MD     A2,  x2, SAB x2             Achilles tendonitis ICD-10-CM: M76.60  ICD-9-CM: 726.71  3/1/2010    Overview Signed 3/1/2010 11:14 AM by Hema Almeida     L>R s/p cortisone             Anxiety ICD-10-CM: F41.9  ICD-9-CM: 300.00  3/1/2010    Overview Signed 3/1/2010 11:14 AM by Hema Almeida     mild             H/O Abnormal Pap Smears ,  ICD-9-CM: 796.9  3/1/2010    Overview Signed 3/1/2010 11:17 AM by Hema Almeida     9510'A and  (no Bx)             Congenital hip dysplasia ICD-10-CM: Q65.89  ICD-9-CM: 755.63  3/1/2010    Overview Signed 3/1/2010 11:18 AM by Blanca Lees.  Recontruction, bone grafting 1969             Mononucleosis ICD-10-CM: B27.90  ICD-9-CM: 858  2009    Overview Signed 2012  3:01 PM by Cristina Beltrán MD     Diagnosed [facility], 2009             Strep throat ICD-10-CM: J02.0  ICD-9-CM: 034.0  2009        Scoliosis ICD-10-CM: M41.9  ICD-9-CM: 737.30  2007    Overview Signed 3/1/2010 11:15 AM by Hema lAmeida     mild             Hip pain ICD-10-CM: M25.559  ICD-9-CM: 719.45  2007    Overview Signed 3/1/2010 11:15 AM by Ed Castleman [place]     right             S/P Surgery for Right Lateral Epicondylitis, 2005 ICD-10-CM: Javno Bravo  ICD-9-CM: 726.32  1/1/2005    Overview Addendum 5/8/2012  3:03 PM by Maribell Calloway MD     TOA             S/P LASIK surgery ICD-10-CM: P42.907  ICD-9-CM: V45.69  1/1/2005        GERD (gastroesophageal reflux disease) ICD-10-CM: K21.9  ICD-9-CM: 530.81  1/1/2003    Overview Addendum 5/8/2012  3:01 PM by Maribell Calloway MD     ([facility] [place] 2003                       PAST SURGICAL HISTORY       Past Surgical History:   Procedure Laterality Date    CRYOCAUTERY OF CERVIX  ~11/2008    Dr Kami Mahoney    right 3rd finger tendon rupture repair    HX COLONOSCOPY  4/19/2016, Dr Brina Rosario    Polyp: tubular adenoma, repeat 3 yrs    HX KNEE ARTHROSCOPY Left 12/13/2016    related to torn meniscus    HX PARTIAL HYSTERECTOMY  4/11/13    Dr Jacqui Lopez    congenital hip dysplasia, left hip reconstruction and bone grafting    REPAIR ACHILLES TENDON,PRIMARY  10/09    R Achilles; Dr. Stacie Gregg  2005    right lateral epicondylitis surgery; TOA         MEDICATIONS      Current Outpatient Prescriptions   Medication Sig    polyethylene glycol (MIRALAX) 17 gram/dose powder MIX AND DRINK 17G BY MOUTH DAILY* NO SUB PERRIGO**    cholecalciferol (VITAMIN D3) 1,000 unit cap Take 2,000 Units by mouth daily. Indications: takes 2 tabs QD     No current facility-administered medications for this visit.            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    Smoking status: Never Smoker    Smokeless tobacco: Never Used    Alcohol use 0.0 oz/week     0 Standard drinks or equivalent per week      Comment: maybe 2-3 drinks of wine/cocktails per year    Drug use: No    Sexual activity: Yes     Partners: Male     Birth control/ protection: Surgical      Comment: Hysterectomy      Other Topics Concern    Caffeine Concern No     1 cup of coffee a day maybe 4-5 days a week    Weight Concern Yes     happy her wt came down 30 lbs w/ healthier diet since  and resuming regular exercise: down from 244 to 212    Special Diet Yes     has been meeting w/ dietician : doing high protein, low fat, increased fiber and veggies, low carbs, reduced sugars, smaller portions    Exercise Yes     3 x a week: walks 1-2 hrs, gardens 1-2 x a week     Social History Narrative    Native of [place]; [place] [facility] [place] ;  ~; Got re- 2012    Recalls getting part of her Hepatitis B series in the , not sure if she got #3        IMMUNIZATIONS  Immunization History   Administered Date(s) Administered    TD Vaccine 2009         FAMILY HISTORY     Family History   Problem Relation Age of Onset    Arrhythmia Mother     Bipolar Disorder Mother     Cancer Mother      cervical dx in her late 29's   [place] High Cholesterol Mother     Heart Disease Mother      heart arrhytmia    Other Mother      brain tumor diagnosed at age 67, stable    Diabetes Father      obesity    Arthritis-osteo Father      ? RA as well    Cancer Father      skin cancer    Gout Father     Heart Disease Maternal Grandmother      CHF in her 63's    Heart Disease Maternal Grandfather       81yo    Hypertension Maternal Grandfather     Diabetes Paternal Grandmother     Stroke Paternal Grandmother       in her [de-identified] Arthritis-rheumatoid Paternal Grandmother     Cancer Paternal Grandfather      liver and colon CA,  mid 66's    Diabetes Paternal Grandfather     Depression Sister      2 yrs younger than pt    Anxiety Sister     Other Sister      scoliosis    Other Daughter      Tourettes, tremor, tics, milk/soy allergy, lactose intolerance    Other Daughter      Tourettes, anxiety    Breast Cancer Paternal Aunt      dx in her 29's    Diabetes Paternal Aunt      type 2    Diabetes Paternal Uncle      type 2    Arthritis-rheumatoid Paternal Aunt     Gout Paternal Aunt          VITALS     Visit Vitals    /80 (BP 1 Location: Left arm, BP Patient Position: Sitting)    Pulse 63    Temp 98.8 °F (37.1 °C) (Oral)    Resp 18    Ht 5' 9\" (1.753 m)    Wt 212 lb 9.6 oz (96.4 kg)    LMP 2013    SpO2 98%    BMI 31.4 kg/m2          PHYSICAL EXAMINATION     Physical Exam   Constitutional: She is oriented to person, place, and time and well-developed, well-nourished, and in no distress. HENT:   Right Ear: Tympanic membrane normal.   Left Ear: Tympanic membrane normal.   Nose: Nose normal.   Mouth/Throat: Oropharynx is clear and moist. No oropharyngeal exudate. Eyes: Conjunctivae and EOM are normal. Pupils are equal, round, and reactive to light. Neck: Neck supple. Carotid bruit is not present. No thyromegaly present. Cardiovascular: Normal rate, regular rhythm, normal heart sounds and intact distal pulses. No murmur heard. Pulmonary/Chest: Effort normal and breath sounds normal.   Abdominal: Soft. Bowel sounds are normal. She exhibits no distension and no mass. There is no tenderness. Musculoskeletal: Normal range of motion. She exhibits no edema or tenderness. Lymphadenopathy:     She has no cervical adenopathy. Neurological: She is alert and oriented to person, place, and time. She has normal strength and normal reflexes. She exhibits normal muscle tone.  Coordination normal.   Reflex Scores:       Patellar reflexes are 2+ on the right side and 2+ on the left side. Achilles reflexes are 2+ on the right side and 2+ on the left side. Skin: Skin is warm. Psychiatric: Mood and affect normal.   Vitals reviewed.              ASSESSMENT & PLAN       ICD-10-CM ICD-9-CM    1. Routine general medical examination at a health care facility Z00.00 V70.0 CBC W/O DIFF      LIPID PANEL      METABOLIC PANEL, BASIC      TSH 3RD GENERATION   2. Diabetes mellitus type 2, noninsulin dependent (HCC) E11.9 250.00  DIABETES EYE EXAM      HEMOGLOBIN A1C WITH EAG      REFERRAL TO OPHTHALMOLOGY      METABOLIC PANEL, BASIC   3. Mild Hypercholesterolemia with good HDL E78.00 272.0 LIPID PANEL   4. Anxiety F41.9 300.00 Referred for counseling. Information given. Declines meds for now. Follow up after a few counseling sessions and will go from there   5. Right sided sciatica M54.31 724.3 REFERRAL TO ORTHOPEDICS   6. History of low back pain, DDD, mild scoliosis and mild-moderate disc protrusions Z87.39 V13.59 REFERRAL TO ORTHOPEDICS     Fasting labs today  Reviewed diet, nutrition, exercise and weight control  Commended on her modifications and good success thus far!   She continues to meet w/ a dietician  Cardiovascular risk and specific lipid/LDL/BS/HgBA1c goals reviewed  Specific diabetic recommendations: annual eye examinations at Ophthalmology discussed and long term diabetic complications discussed   Further follow up & other recommendations pending review of labs as well  Sees gyn annually in the fall and reportedly up to date on gyn exam/pap and mammogram screening, normal  Further follow up & other recommendations pending review of labs as well

## 2017-09-13 NOTE — MR AVS SNAPSHOT
Visit Information Date & Time Provider Department Dept. Phone Encounter #  
 9/13/2017  9:00 AM Shoshana Gibson, [address] [phone] [id number] Upcoming Health Maintenance Date Due  
 EYE EXAM RETINAL OR DILATED Q1 2/17/1974 FOBT Q 1 YEAR AGE 50-75 4/17/2018* FOOT EXAM Q1 2/28/2018 MICROALBUMIN Q1 2/28/2018 LIPID PANEL Q1 2/28/2018 HEMOGLOBIN A1C Q6M 3/13/2018 PAP AKA CERVICAL CYTOLOGY 9/15/2018 BREAST CANCER SCRN MAMMOGRAM 5/6/2019 DTaP/Tdap/Td series (2 - Td) 2/28/2027 *Topic was postponed. The date shown is not the original due date. Allergies as of 9/13/2017  Review Complete On: 9/13/2017 By: Shoshana Gibson MD  
  
 Severity Noted Reaction Type Reactions Metformin High 02/28/2017   Side Effect Other (comments) Severe depression and coldness in arms Bleach (Sodium Hypochlorite)  03/31/2017    Other (comments) Prednisone  11/28/2016    Other (comments) SOB, flushed, red rash Current Immunizations  Reviewed on 1/13/2015 Name Date  
 TD Vaccine 3/20/2009 Not reviewed this visit You Were Diagnosed With   
  
 Codes Comments Routine general medical examination at a health care facility    -  Primary ICD-10-CM: Z00.00 ICD-9-CM: V70.0 Diabetes mellitus type 2, noninsulin dependent ([facility]     ICD-10-CM: E11.9 ICD-9-CM: 250.00 Hypercholesterolemia     ICD-10-CM: E78.00 ICD-9-CM: 272.0 Vitals BP Pulse Temp Resp Height(growth percentile) Weight(growth percentile) 114/80 (BP 1 Location: Left arm, BP Patient Position: Sitting) 63 98.8 °F (37.1 °C) (Oral) 18 5' 9\" (1.753 m) 212 lb 9.6 oz (96.4 kg) LMP SpO2 BMI OB Status Smoking Status 03/16/2013 98% 31.4 kg/m2 Hysterectomy Never Smoker Vitals History BMI and BSA Data Body Mass Index Body Surface Area  
 31.4 kg/m 2 2.17 m 2 Preferred Pharmacy Pharmacy Name Phone [address] [facility], [address] [place] [address] [phone] Your Updated Medication List  
  
   
This list is accurate as of: 9/13/17 10:06 AM.  Always use your most recent med list.  
  
  
  
  
 polyethylene glycol 17 gram/dose powder Commonly known as:  Lv Egan MIX AND DRINK 17G BY MOUTH DAILY* NO SUB PERRIGO**  
  
 VITAMIN D3 1,000 unit Cap Generic drug:  cholecalciferol Take 2,000 Units by mouth daily. Indications: takes 2 tabs QD We Performed the Following CBC W/O DIFF [02843 CPT(R)] HEMOGLOBIN A1C WITH EAG [90676 CPT(R)]  DIABETES EYE EXAM [HM6 Custom] LIPID PANEL [68069 CPT(R)] METABOLIC PANEL, BASIC [66014 CPT(R)] REFERRAL TO OPHTHALMOLOGY [REF57 Custom] Comments:  
 Patient given information to schedule diabetic eye exam  
 TSH 3RD GENERATION [90370 CPT(R)] To-Do List   
 09/18/2017 4:00 PM  
  Appointment with Governor Maribel at [address] [place] [address] ([phone]) Referral Information Referral ID Referred By Referred To  
  
 [id number] [place] Ike [facility] [address] [facility], [address] Visits Status Start Date End Date 1 New Request 9/13/17 9/13/18 If your referral has a status of pending review or denied, additional information will be sent to support the outcome of this decision. Patient Instructions Learning About Type 2 Diabetes What is type 2 diabetes? Insulin is a hormone that helps your body use sugar from your food as energy. Type 2 diabetes happens when your body can't use insulin the right way. Over time, the pancreas can't make enough insulin. If you don't have enough insulin, too much sugar stays in your blood.  
If you are overweight, get little or no exercise, or have type 2 diabetes in your family, you are more likely to have problems with the way insulin works in your body.  Americans, Hispanics, Native Americans,  Americans, and Pacific Islanders have a higher risk for type 2 diabetes. Type 2 diabetes can be prevented or delayed with a healthy lifestyle, which includes staying at a healthy weight, making smart food choices, and getting regular exercise. What can you expect with type 2 diabetes? [place] keep hearing about how important it is to keep your blood sugar within a target range. That's because over time, high blood sugar can lead to serious problems. It can: 
· Harm your eyes, nerves, and kidneys. · Damage your blood vessels, leading to heart disease and stroke. · Reduce blood flow and cause nerve damage to parts of your body, especially your feet. This can cause slow healing and pain when you walk. · Make your immune system weak and less able to fight infections. When people hear the word \"diabetes,\" they often think of problems like these. But daily care and treatment can help prevent or delay these problems. The goal is to keep your blood sugar in a target range. That's the best way to reduce your chance of having more problems from diabetes. What are the symptoms? Some people who have type 2 diabetes may not have any symptoms early on. Many people with the disease don't even know they have it at first. But with time, diabetes starts to cause symptoms. You experience most symptoms of type 2 diabetes when your blood sugar is either too high or too low. The most common symptoms of high blood sugar include: · Thirst. 
· Frequent urination. · Weight loss. · Blurry vision. The symptoms of low blood sugar include: · Sweating. · Shakiness. · Weakness. · Hunger. · Confusion. How can you prevent type 2 diabetes? The best way to prevent or delay type 2 diabetes is to adopt healthy habits, which include: 
· Staying at a healthy weight. · Exercising regularly. · Eating healthy foods. How is type 2 diabetes treated? If you have type 2 diabetes, here are the most important things you can do. · Take your diabetes medicines. · Check your blood sugar as often as your doctor recommends. Also, get a hemoglobin A1c test at least every 6 months. · Try to eat a variety of foods and to spread carbohydrate throughout the day. Carbohydrate raises blood sugar higher and more quickly than any other nutrient does. Carbohydrate is found in sugar, breads and cereals, fruit, starchy vegetables such as potatoes and corn, and milk and yogurt. · Get at least 30 minutes of exercise on most days of the week. Walking is a good choice. You also may want to do other activities, such as running, swimming, cycling, or playing tennis or team sports. If your doctor says it's okay, do muscle-strengthening exercises at least 2 times a week. · See your doctor for checkups and tests on a regular schedule. · If you have high blood pressure or high cholesterol, take the medicines as prescribed by your doctor. · Do not smoke. Smoking can make health problems worse. This includes problems you might have with type 2 diabetes. If you need help quitting, talk to your doctor about stop-smoking programs and medicines. These can increase your chances of quitting for good. Follow-up care is a key part of your treatment and safety. Be sure to make and go to all appointments, and call your doctor if you are having problems. It's also a good idea to know your test results and keep a list of the medicines you take. Where can you learn more? Go to http://demi-kellie.info/. Enter K558 in the search box to learn more about \"Learning About Type 2 Diabetes. \" Current as of: March 13, 2017 Content Version: 11.3 © 5835-2158 [employer]. Care instructions adapted under license by [employer] (which disclaims liability or warranty for this information).  If you have questions about a medical condition or this instruction, always ask your healthcare professional. [address] any warranty or liability for your use of this information. Introducing [facility]! Dear Roula Mistry: Thank you for requesting a Toutpost account. Our records indicate that you already have an active Toutpost account. You can access your account anytime at [email or web address] Did you know that you can access your hospital and ER discharge instructions at any time in Toutpost? You can also review all of your test results from your hospital stay or ER visit. Additional Information If you have questions, please visit the Frequently Asked Questions section of the Toutpost website at [email or web address] Remember, Toutpost is NOT to be used for urgent needs. For medical emergencies, dial 911. Now available from your iPhone and Android! Please provide this summary of care documentation to your next provider. Your primary care clinician is listed as HIREN GRAMAJO. If you have any questions after today's visit, please call [phone].

## 2017-09-14 LAB
BUN SERPL-MCNC: 17 MG/DL (ref 6–24)
BUN/CREAT SERPL: 17 (ref 9–23)
CALCIUM SERPL-MCNC: 10.5 MG/DL (ref 8.7–10.2)
CHLORIDE SERPL-SCNC: 99 MMOL/L (ref 96–106)
CHOLEST SERPL-MCNC: 242 MG/DL (ref 100–199)
CO2 SERPL-SCNC: 27 MMOL/L (ref 18–29)
CREAT SERPL-MCNC: 0.99 MG/DL (ref 0.57–1)
ERYTHROCYTE [DISTWIDTH] IN BLOOD BY AUTOMATED COUNT: 15 % (ref 12.3–15.4)
EST. AVERAGE GLUCOSE BLD GHB EST-MCNC: 120 MG/DL
GLUCOSE SERPL-MCNC: 108 MG/DL (ref 65–99)
HBA1C MFR BLD: 5.8 % (ref 4.8–5.6)
HCT VFR BLD AUTO: 46.3 % (ref 34–46.6)
HDLC SERPL-MCNC: 74 MG/DL
HGB BLD-MCNC: 15.3 G/DL (ref 11.1–15.9)
INTERPRETATION, 910389: NORMAL
LDLC SERPL CALC-MCNC: 131 MG/DL (ref 0–99)
MCH RBC QN AUTO: 28.4 PG (ref 26.6–33)
MCHC RBC AUTO-ENTMCNC: 33 G/DL (ref 31.5–35.7)
MCV RBC AUTO: 86 FL (ref 79–97)
PLATELET # BLD AUTO: 255 X10E3/UL (ref 150–379)
POTASSIUM SERPL-SCNC: 4.9 MMOL/L (ref 3.5–5.2)
RBC # BLD AUTO: 5.38 X10E6/UL (ref 3.77–5.28)
SODIUM SERPL-SCNC: 139 MMOL/L (ref 134–144)
TRIGL SERPL-MCNC: 184 MG/DL (ref 0–149)
TSH SERPL DL<=0.005 MIU/L-ACNC: 1.4 UIU/ML (ref 0.45–4.5)
VLDLC SERPL CALC-MCNC: 37 MG/DL (ref 5–40)
WBC # BLD AUTO: 6.7 X10E3/UL (ref 3.4–10.8)

## 2017-09-18 ENCOUNTER — HOSPITAL ENCOUNTER (OUTPATIENT)
Dept: NUTRITION | Age: 52
Discharge: HOME | End: 2017-09-18
Payer: COMMERCIAL

## 2017-09-18 PROCEDURE — 97803 MED NUTRITION INDIV SUBSEQ: CPT | Performed by: DIETITIAN, REGISTERED

## 2017-09-18 NOTE — PROGRESS NOTES
NUTRITION  DAILY TREATMENT NOTE  Patient Name: Maco Inman         Date: 2017  : 1965    YES Patient  Verified  Insurance: Payor: Brannon Cordial / Plan: [employer] / Product Type: HMO /    Diagnosis: In time:   4:00pm             Out time:   4:30pm   Total Treatment Time (min):   30     SUBJECTIVE    Medication Changes/New allergies or changes in medical history, any new surgeries or procedures? NO    If yes, update Summary List   Subjective Functional Status/Changes:  []  No changes reported     Pt has been on a break from goals of weight loss. She notes cravings for food are still strong as ever and her discomfort with her weight is more important to her. She wants to resume weight loss work using previous plan of 3 consistent low calorie, high fiber and protein meals per day. Most recent labs show the following:  LDL = 131 (109) - H - increased  HDL = 74 (67) - good - increased  TG = 184 (101) - H- increased  A1C = 5.8% (6.1%) - H - decreased from 6.6% (2016)    She has continued her exercise and increased walking. Worked to set goal at 16-20miles total for the week however it is accomplished. Encouraged pt again to seak help from therapist for food cravings and relationship to food beyond RD. She agreed to look into cost of this. Reviewed plan. Pt expressed comprehension, high motivation, and compliance is expected.         Current Wt: 216.8 Previous Wt: 212.8 Wt Change: +4     OBJECTIVE    Patient Education:  [x]  Review current plan with patient   []  Other:    Handouts/  Information Provided: []  Carbohydrates  []  Protein  []  Fiber  []  Serving Sizes  []  Fluids  []  General guidelines []  Diabetes  []  Cholesterol  []  Sodium  []  SBGM  []  Food Journals  []  Others:    Estimated Needs: 1500 100 150 50    Calories Protein CHO Fat     ASSESSMENT    []  Pt Progressing Well  [x]  Slow Progress []  No Progress    Other:    []  Understand Dietary Changes/Recs []  No Change [x] Improving []  N/A   []  Weight [x]  Negative Change []  Improving []  N/A     Glucose Levels []  No Change [x]  Improving []  N/A   []  Cholesterol Levels [x]  Negative Change []  Improving []  N/A     RECOMMENDATIONS    1. Return to previous lower calorie plan with tracking meals and snacks   2. Continue carb controlled choices   3. Walk 16-20miles per week total   4. Look into counseling    5.       PLAN    [x]  Continue on current plan []  Follow-up PRN   []  Discharge due to :    [x]  Next Appt[de-identified] Dec 11 @ 4:00pm     Dietitian: Yenny Camilo MS, RD    Date: 9/18/2017 Time: 5:59 PM

## 2017-10-01 ENCOUNTER — TELEPHONE (OUTPATIENT)
Dept: FAMILY MEDICINE CLINIC | Age: 52
End: 2017-10-01

## 2017-10-01 DIAGNOSIS — E83.52 HYPERCALCEMIA: Primary | ICD-10-CM

## 2017-10-02 NOTE — TELEPHONE ENCOUNTER
Fasting , HgBA1c improved nicely down from 6.1 to 5.8 which is best it has been the last several checks, great job! LDL and TG have gone up and both are outside of goal range, but HDL remains good and is improved from last check at 74 which is well above the goal of >50. Keep working on diet, exercise, wt goals. Continue to focus on sugar free, low carbs and at least 150 minutes moderate exercise per week. Thyroid normal  Calcium mildly elevated. Does not appear that she is taking any extra calcium supplementation but she is on vit D supplement. I recommend RTC for follow up lab testing only, no appt needed and no need to fast for that appt  Orders pended. Otherwise fasting follow up 6months, set now.

## 2017-10-05 DIAGNOSIS — E83.52 HYPERCALCEMIA: ICD-10-CM

## 2017-10-09 LAB
1,25(OH)2D3 SERPL-MCNC: 26.9 PG/ML (ref 19.9–79.3)
25(OH)D3+25(OH)D2 SERPL-MCNC: 39.9 NG/ML (ref 30–100)
CA-I SERPL ISE-MCNC: 5.5 MG/DL (ref 4.5–5.6)
CALCIUM SERPL-MCNC: 10.2 MG/DL (ref 8.7–10.2)
PTH-INTACT SERPL-MCNC: 36 PG/ML (ref 15–65)
VIT A SERPL-MCNC: 62 UG/DL (ref 20–65)

## 2017-10-28 ENCOUNTER — TELEPHONE (OUTPATIENT)
Dept: FAMILY MEDICINE CLINIC | Age: 52
End: 2017-10-28

## 2017-10-29 NOTE — TELEPHONE ENCOUNTER
Follow up labs all normal including the Vit D, Vit A, PTH and repeat calcium levels  No additional recs at this time.

## 2017-12-11 ENCOUNTER — HOSPITAL ENCOUNTER (OUTPATIENT)
Dept: NUTRITION | Age: 52
Discharge: HOME | End: 2017-12-11
Payer: COMMERCIAL

## 2017-12-11 PROCEDURE — 97803 MED NUTRITION INDIV SUBSEQ: CPT | Performed by: DIETITIAN, REGISTERED

## 2017-12-11 NOTE — PROGRESS NOTES
NUTRITION  FOLLOW-UP TREATMENT NOTE  Patient Name: Gill Ding         Date: 2017  : 1965    YES Patient  Verified  Diagnosis: Impaired fasting glucose   In time:   4:00pm             Out time:   4:30pm   Total Treatment Time (min):   30     SUBJECTIVE/ASSESSMENT    Changes in medication or medical history? Any new allergies, surgeries or procedures? NO    If yes, update Summary List   Pt returned to restricting foods for weight loss for 1st week after last session and then began retuning to less restriction with portion control still in mind. She continues to walk most days and is checking fasting blood sugars mostly in the mornings. Average BG have been 106-118mg/dl. She will have a new A1C drawn in March. Pt rated high body satisfaction with main focus being keeping A1C, LDL, and TG in check. Reviewed role of diet and exercise in management of these. Pt and RD agree that the next step for her is to seek [place] from therapist. She is currently under high amounts of stress at work and getting little sleep. Pt has the tools to maintain her health through healthy diet and exercise.      Current Wt:  no wt Previous Wt: No wt Wt Change:      Achievement of Goals: --walk 16-20 miles per week total - walking consistently, contined  -return to previous plan for weight loss - no longer appropriate         Patient Education:  [x]  Review current plan with patient   []  Other:    Handouts/  Information Provided: []  Carbohydrates  []  Protein  []  Fiber  []  Serving Sizes  []  Fluids  []  General guidelines []  Diabetes  []  Cholesterol  []  Sodium  []  SBGM  []  Food Journals  []  Others:      New Patient Goals: - continue positive behavior changes around portions, carbohydrate amounts, increased physical activity, and decreased saturated fat intake  -see therapist (names provided) starting January     PLAN      Continue on current plan []  Follow-up PRN   [x]  Discharge due to : Change in pt goals. Better served through mental health counselor.    []  Next appt:      Dietitian: Analilia Pillai MS RD    Date: 12/11/2017 Time: 5:35 PM

## 2018-03-14 ENCOUNTER — OFFICE VISIT (OUTPATIENT)
Dept: FAMILY MEDICINE CLINIC | Age: 53
End: 2018-03-14

## 2018-03-14 VITALS
TEMPERATURE: 98.9 F | WEIGHT: 238.8 LBS | RESPIRATION RATE: 18 BRPM | HEART RATE: 58 BPM | OXYGEN SATURATION: 97 % | BODY MASS INDEX: 35.37 KG/M2 | DIASTOLIC BLOOD PRESSURE: 86 MMHG | HEIGHT: 69 IN | SYSTOLIC BLOOD PRESSURE: 128 MMHG

## 2018-03-14 DIAGNOSIS — E78.2 MIXED HYPERLIPIDEMIA: ICD-10-CM

## 2018-03-14 DIAGNOSIS — E11.9 DIABETES MELLITUS TYPE 2, NONINSULIN DEPENDENT (HCC): Primary | ICD-10-CM

## 2018-03-14 DIAGNOSIS — R03.0 BORDERLINE HYPERTENSION: ICD-10-CM

## 2018-03-14 LAB — HBA1C MFR BLD HPLC: 6 %

## 2018-03-14 RX ORDER — CHOLECALCIFEROL (VITAMIN D3) 125 MCG
CAPSULE ORAL DAILY
COMMUNITY
End: 2021-01-04 | Stop reason: ALTCHOICE

## 2018-03-14 NOTE — PROGRESS NOTES
Chief Complaint   Patient presents with    Diabetes     fasting follow up    Cholesterol Problem     1. Have you been to the ER, urgent care clinic since your last visit? Hospitalized since your last visit? No    2. Have you seen or consulted any other health care providers outside of the [address] [place] [address] since your last visit? Include any pap smears or colon screening.  No

## 2018-03-14 NOTE — PATIENT INSTRUCTIONS
Body Mass Index: Care Instructions  Your Care Instructions    Body mass index (BMI) can help you see if your weight is raising your risk for health problems. It uses a formula to compare how much you weigh with how tall you are. · A BMI lower than 18.5 is considered underweight. · A BMI between 18.5 and 24.9 is considered healthy. · A BMI between 25 and 29.9 is considered overweight. A BMI of 30 or higher is considered obese. If your BMI is in the normal range, it means that you have a lower risk for weight-related health problems. If your BMI is in the overweight or obese range, you may be at increased risk for weight-related health problems, such as high blood pressure, heart disease, stroke, arthritis or joint pain, and diabetes. If your BMI is in the underweight range, you may be at increased risk for health problems such as fatigue, lower protection (immunity) against illness, muscle loss, bone loss, hair loss, and hormone problems. BMI is just one measure of your risk for weight-related health problems. You may be at higher risk for health problems if you are not active, you eat an unhealthy diet, or you drink too much alcohol or use tobacco products. Follow-up care is a key part of your treatment and safety. Be sure to make and go to all appointments, and call your doctor if you are having problems. It's also a good idea to know your test results and keep a list of the medicines you take. How can you care for yourself at home? · Practice healthy eating habits. This includes eating plenty of fruits, vegetables, whole grains, lean protein, and low-fat dairy. · If your doctor recommends it, get more exercise. Walking is a good choice. Bit by bit, increase the amount you walk every day. Try for at least 30 minutes on most days of the week. · Do not smoke. Smoking can increase your risk for health problems. If you need help quitting, talk to your doctor about stop-smoking programs and medicines. These can increase your chances of quitting for good. · Limit alcohol to 2 drinks a day for men and 1 drink a day for women. Too much alcohol can cause health problems. If you have a BMI higher than 25  · Your doctor may do other tests to check your risk for weight-related health problems. This may include measuring the distance around your waist. A waist measurement of more than 40 inches in men or 35 inches in women can increase the risk of weight-related health problems. · Talk with your doctor about steps you can take to stay healthy or improve your health. You may need to make lifestyle changes to lose weight and stay healthy, such as changing your diet and getting regular exercise. If you have a BMI lower than 18.5  · Your doctor may do other tests to check your risk for health problems. · Talk with your doctor about steps you can take to stay healthy or improve your health. You may need to make lifestyle changes to gain or maintain weight and stay healthy, such as getting more healthy foods in your diet and doing exercises to build muscle. Where can you learn more? Go to http://demi-kellie.info/. Enter S176 in the search box to learn more about \"Body Mass Index: Care Instructions. \"  Current as of: October 13, 2016  Content Version: 11.4  © 5372-5030 [employer]. Care instructions adapted under license by [employer] (which disclaims liability or warranty for this information). If you have questions about a medical condition or this instruction, always ask your healthcare professional. [address] any warranty or liability for your use of this information. Learning About Diabetes Food Guidelines  Your Care Instructions    Meal planning is important to manage diabetes. It helps keep your blood sugar at a target level (which you set with your doctor). You don't have to eat special foods.  You can eat what your family eats, including sweets once in a while. But you do have to pay attention to how often you eat and how much you eat of certain foods. You may want to work with a dietitian or a certified diabetes educator (CDE) to help you plan meals and snacks. A dietitian or CDE can also help you lose weight if that is one of your goals. What should you know about eating carbs? Managing the amount of carbohydrate (carbs) you eat is an important part of healthy meals when you have diabetes. Carbohydrate is found in many foods. · Learn which foods have carbs. And learn the amounts of carbs in different foods. ¨ Bread, cereal, pasta, and rice have about 15 grams of carbs in a serving. A serving is 1 slice of bread (1 ounce), ½ cup of cooked cereal, or 1/3 cup of cooked pasta or rice. ¨ Fruits have 15 grams of carbs in a serving. A serving is 1 small fresh fruit, such as an apple or orange; ½ of a banana; ½ cup of cooked or canned fruit; ½ cup of fruit juice; 1 cup of melon or raspberries; or 2 tablespoons of dried fruit. ¨ Milk and no-sugar-added yogurt have 15 grams of carbs in a serving. A serving is 1 cup of milk or 2/3 cup of no-sugar-added yogurt. ¨ Starchy vegetables have 15 grams of carbs in a serving. A serving is ½ cup of mashed potatoes or sweet potato; 1 cup winter squash; ½ of a small baked potato; ½ cup of cooked beans; or ½ cup cooked corn or green peas. · Learn how much carbs to eat each day and at each meal. A dietitian or CDE can teach you how to keep track of the amount of carbs you eat. This is called carbohydrate counting. · If you are not sure how to count carbohydrate grams, use the Plate Method to plan meals. It is a good, quick way to make sure that you have a balanced meal. It also helps you spread carbs throughout the day. ¨ Divide your plate by types of foods.  Put non-starchy vegetables on half the plate, meat or other protein food on one-quarter of the plate, and a grain or starchy vegetable in the final quarter of the plate. To this you can add a small piece of fruit and 1 cup of milk or yogurt, depending on how many carbs you are supposed to eat at a meal.  · Try to eat about the same amount of carbs at each meal. Do not \"save up\" your daily allowance of carbs to eat at one meal.  · Proteins have very little or no carbs per serving. Examples of proteins are beef, chicken, turkey, fish, eggs, tofu, cheese, cottage cheese, and peanut butter. A serving size of meat is 3 ounces, which is about the size of a deck of cards. Examples of meat substitute serving sizes (equal to 1 ounce of meat) are 1/4 cup of cottage cheese, 1 egg, 1 tablespoon of peanut butter, and ½ cup of tofu. How can you eat out and still eat healthy? · Learn to estimate the serving sizes of foods that have carbohydrate. If you measure food at home, it will be easier to estimate the amount in a serving of restaurant food. · If the meal you order has too much carbohydrate (such as potatoes, corn, or baked beans), ask to have a low-carbohydrate food instead. Ask for a salad or green vegetables. · If you use insulin, check your blood sugar before and after eating out to help you plan how much to eat in the future. · If you eat more carbohydrate at a meal than you had planned, take a walk or do other exercise. This will help lower your blood sugar. What else should you know? · Limit saturated fat, such as the fat from meat and dairy products. This is a healthy choice because people who have diabetes are at higher risk of heart disease. So choose lean cuts of meat and nonfat or low-fat dairy products. Use olive or canola oil instead of butter or shortening when cooking. · Don't skip meals. Your blood sugar may drop too low if you skip meals and take insulin or certain medicines for diabetes. · Check with your doctor before you drink alcohol. Alcohol can cause your blood sugar to drop too low.  Alcohol can also cause a bad reaction if you take certain diabetes medicines. Follow-up care is a key part of your treatment and safety. Be sure to make and go to all appointments, and call your doctor if you are having problems. It's also a good idea to know your test results and keep a list of the medicines you take. Where can you learn more? Go to http://demi-kellie.info/. Enter X380 in the search box to learn more about \"Learning About Diabetes Food Guidelines. \"  Current as of: March 13, 2017  Content Version: 11.4  © 7221-1644 Healthwise, Incorporated. Care instructions adapted under license by [employer] (which disclaims liability or warranty for this information). If you have questions about a medical condition or this instruction, always ask your healthcare professional. [address] any warranty or liability for your use of this information.

## 2018-03-14 NOTE — ASSESSMENT & PLAN NOTE
Stable, based on history, physical exam and review of pertinent labs, studies and medications; meds reconciled; lifestyle modifications recommended. Key Antihyperglycemic Medications     Patient is on no antihyperglycemic meds. Other Key Diabetic Medications     Patient is on no other key diabetic meds.         Lab Results   Component Value Date/Time    Hemoglobin A1c 5.8 09/13/2017 10:39 AM    Hemoglobin A1c (POC) 6.0 03/14/2018 01:43 PM    Glucose 108 09/13/2017 10:39 AM    Creatinine 0.99 09/13/2017 10:39 AM    Microalb/Creat ratio (ug/mg creat.) <4.3 02/28/2017 08:49 AM    Cholesterol, total 242 09/13/2017 10:39 AM    HDL Cholesterol 74 09/13/2017 10:39 AM    LDL, calculated 131 09/13/2017 10:39 AM    Triglyceride 184 09/13/2017 10:39 AM     Diabetic Foot and Eye Exam HM Status   Topic Date Due    Diabetic Foot Care  10/31/2018     Eye Exam  10/31/2018

## 2018-03-14 NOTE — PROGRESS NOTES
HISTORY OF PRESENT ILLNESS   HPI  Follow up diabetes. She had biometric screening at work yesterday and brought in a copy of those results. The HgBA1c was not checked and she is particularly interested in that today bc she feels she has really fallen off lately w/ healthy lifestyle modifications. She met w/ a dietician monthly x an entire year last year and now is back to eating more sweets on a regular basis. She has regained the 30 lbs she lost w/ healthier diet and regular exercise; not complying w/ either the past several months. Checks her BS's a few x a week. Fasting in the AM runs 112, 117, 120's, 135.  2-3 hrs after meals runs 150-160  5 hrs after meals runs 89-95  In general feels well, just really disappointed about her wt now. REVIEW OF SYMPTOMS     Review of Systems   Constitutional: Negative. Eyes: Negative. Respiratory: Negative. Cardiovascular: Negative. Gastrointestinal: Negative.     Neurological: Negative.            PROBLEM LIST/MEDICAL HISTORY      Problem List  Date Reviewed: 3/14/2018          Codes Class Noted    Diabetes mellitus type 2, noninsulin dependent ([facility] ICD-10-CM: E11.9  ICD-9-CM: 250.00  12/27/2016    Overview Signed 12/27/2016  2:21 PM by [address], [place]     11-28-16             Tear of medial meniscus of left knee ICD-10-CM: N86.596Y  ICD-9-CM: 836.0  11/28/2016    Overview Signed 11/28/2016 10:42 AM by [address], [place]     ~ Spring 2016, Ortho Dr Brandee Escobedo, scope scheduled 12-13-16             Mild Hypercholesterolemia with good HDL ICD-10-CM: E78.00  ICD-9-CM: 272.0  11/28/2016        Borderline hypertension ICD-10-CM: R03.0  ICD-9-CM: 796.2  11/28/2016        Screening exams for skin cancer ICD-10-CM: Z12.83  ICD-9-CM: V76.43  5/5/2016    Overview Signed 5/5/2016  2:38 PM by MD [address]             Right carpal tunnel syndrome ICD-10-CM: G56.01  ICD-9-CM: 354.0  5/8/2015    Overview Addendum 5/8/2015 11:04 AM by Gloria Torres MD     ~ 2013 Dr [address] Charis Gonzales: EMG study + Impression:  The above electrodiagnostic study reveals evidence of mildly prolonged motor and sensory distal latency of right median nerve, suggesting mild median nerve entrapment at wrist.             Vertigo ICD-10-CM: R42  ICD-9-CM: 780.4  6/3/2013        Vitamin D deficiency ICD-10-CM: E55.9  ICD-9-CM: 268.9  2012    Overview Signed 2012  1:42 PM by Gloria Torres MD     2012             History of low back pain, DDD, mild scoliosis and mild-moderate disc protrusions ICD-10-CM: Z87.39  ICD-9-CM: V13.59  2012        Mild-moderate lumbar disc protrusions L4-5, L5-S1, mild scoliosis ICD-10-CM: M51.26  ICD-9-CM: 722.10  2012    Overview Signed 2012  3:01 PM by Gloria Torres MD     2007             Impaired fasting glucose ICD-10-CM: R73.01  ICD-9-CM: 790.21  8/15/2011        Abnormal Pap smear s/p Cryotherapy ~  ICD-10-CM: UXG0028  ICD-9-CM: MUS5329  2010    Overview Signed 2010  8:53 AM by Gloria Torres MD     GYN Dr Caroline Borja (normal spontaneous vaginal delivery) ICD-10-CM: O80  ICD-9-CM: 222  Unknown    Overview Signed 3/22/2010 10:24 AM by Gloria Torres MD     A2,  x2, SAB x2             Achilles tendonitis ICD-10-CM: M76.60  ICD-9-CM: 726.71  3/1/2010    Overview Signed 3/1/2010 11:14 AM by Juany Po Juan Pablo     L>R s/p cortisone             Anxiety ICD-10-CM: F41.9  ICD-9-CM: 300.00  3/1/2010    Overview Signed 3/1/2010 11:14 AM by Juany Po Juan Pablo     mild             H/O Abnormal Pap Smears 1998 ICD-9-CM: 796.9  3/1/2010    Overview Signed 3/1/2010 11:17 AM by Juany Almeida     [age]'Y and  (no Bx)             Congenital hip dysplasia ICD-10-CM: Q65.89  ICD-9-CM: 755.63  3/1/2010    Overview Signed 3/1/2010 11:18 AM by Shanique Lees.  Recontruction, bone grafting 1969 Mononucleosis ICD-10-CM: B27.90  ICD-9-CM: 409  9/1/2009    Overview Signed 5/8/2012  3:01 PM by Satish Lyons MD     Diagnosed [facility], 9/2009             Strep throat ICD-10-CM: J02.0  ICD-9-CM: 034.0  9/1/2009        Scoliosis ICD-10-CM: M41.9  ICD-9-CM: 737.30  8/1/2007    Overview Signed 3/1/2010 11:15 AM by Arleen Almeida     mild             Hip pain ICD-10-CM: M25.559  ICD-9-CM: 719.45  8/1/2007    Overview Signed 3/1/2010 11:15 AM by Arleen Almeida     right             S/P Surgery for Right Lateral Epicondylitis, 2005 ICD-10-CM: [id number]  ICD-9-CM: 726.32  1/1/2005    Overview Addendum 5/8/2012  3:03 PM by Satish Lyons MD     TOA             S/P LASIK surgery ICD-10-CM: J35.959  ICD-9-CM: V45.69  1/1/2005        GERD (gastroesophageal reflux disease) ICD-10-CM: K21.9  ICD-9-CM: 530.81  1/1/2003    Overview Addendum 5/8/2012  3:01 PM by Satish Lyons MD     ([facility]) 2003                       PAST SURGICAL HISTORY       Past Surgical History:   Procedure Laterality Date    CRYOCAUTERY OF CERVIX  ~11/2008    Dr Abbie Moralez    right 3rd finger tendon rupture repair    HX COLONOSCOPY  4/19/2016, Dr Amna Garcia    Polyp: tubular adenoma, repeat 3 yrs    HX KNEE ARTHROSCOPY Left 12/13/2016    related to torn meniscus    HX PARTIAL HYSTERECTOMY  4/11/13    Dr Rafaela Estrada    congenital hip dysplasia, left hip reconstruction and bone grafting    REPAIR ACHILLES TENDON,PRIMARY  10/09    R Achilles; Dr. Theoplis Aschoff  2005    right lateral epicondylitis surgery; TOA         MEDICATIONS      Current Outpatient Prescriptions   Medication Sig    cholecalciferol, vitamin D3, (VITAMIN D3) 2,000 unit tab Take  by mouth daily.     polyethylene glycol (MIRALAX) 17 gram/dose powder MIX AND DRINK 17G BY MOUTH DAILY* NO SUB PERRIGO**     No current facility-administered medications for this visit.            ALLERGIES     Allergies   Allergen Reactions    Metformin Other (comments)     Severe depression and coldness in arms    Bleach (Sodium Hypochlorite) Other (comments)    Prednisone Other (comments)     SOB, flushed, red rash          SOCIAL HISTORY       Social History     Social History    Marital status:      Spouse name: N/A    Number of children: 2    Years of education: N/A     Occupational History    Speech Pathologist for [employer]      Social History Main Topics    Smoking status: Never Smoker    Smokeless tobacco: Never Used    Alcohol use 0.0 oz/week     0 Standard drinks or equivalent per week      Comment: maybe 2-3 drinks of wine/cocktails per year    Drug use: No    Sexual activity: Yes     Partners: Male     Birth control/ protection: Surgical      Comment: Hysterectomy 2013     Other Topics Concern    Caffeine Concern No     1 cup of coffee a day maybe 4-5 days a week    Weight Concern Yes     has regained the 30 lbs she lost w/ healthier diet and regular exercise; not complying w/ either the past several months    Special Diet No     met w/ dietician 12/2016-12/2017; knows what she is supposed to do but has not been eating healthy, craving sweets and eats them every day, \"out of control\"    Exercise No     none since ~     Social History Narrative    Native of [place]; moved from [facility] to [place];  ~2006; Got re- 4/2012    Recalls getting part of her Hepatitis B series in the 1990's, not sure if she got #3        IMMUNIZATIONS  Immunization History   Administered Date(s) Administered    TD Vaccine 03/20/2009         FAMILY HISTORY     Family History   Problem Relation Age of Onset    Arrhythmia Mother     Bipolar Disorder Mother     Cancer Mother      cervical dx in her late 29's   Dorina Rooney High Cholesterol Mother     Heart Disease Mother      heart arrhytmia    Other Mother      brain tumor diagnosed at age 67, stable    Diabetes Father      obesity    Arthritis-osteo Father      ? RA as well    Cancer Father      skin cancer    Gout Father     Heart Disease Maternal Grandmother      CHF in her 63's    Heart Disease Maternal Grandfather       79yo    Hypertension Maternal Grandfather     Diabetes Paternal Grandmother     Stroke Paternal Grandmother       in her [de-identified]    Arthritis-rheumatoid Paternal Grandmother     Cancer Paternal Grandfather      liver and colon CA,  mid 66's    Diabetes Paternal Grandfather     Depression Sister      2 yrs younger than pt    Anxiety Sister     Other Sister      scoliosis    Other Daughter      Tourettes, tremor, tics, milk/soy allergy, lactose intolerance    Other Daughter      Tourettes, anxiety    Breast Cancer Paternal Aunt      dx in her 29's    Diabetes Paternal Aunt      type 2    Diabetes Paternal Uncle      type 2    Arthritis-rheumatoid Paternal Aunt     Gout Paternal Aunt          VITALS     Visit Vitals    /86 (BP 1 Location: Left arm, BP Patient Position: Sitting)    Pulse (!) 58    Temp 98.9 °F (37.2 °C) (Oral)    Resp 18    Ht 5' 9\" (1.753 m)    Wt 238 lb 12.8 oz (108.3 kg)    LMP 2013    SpO2 97%    BMI 35.26 kg/m2          PHYSICAL EXAMINATION     Physical Exam   Constitutional: No distress. Cardiovascular: Normal rate, regular rhythm and normal heart sounds.     Pulmonary/Chest: Effort normal and breath sounds normal.   Vitals reviewed.          DIAGNOSTIC DATA         LABORATORY DATA       Lab Results  Component Value Date/Time   Cholesterol, total 242 (H) 2017 10:39 AM   HDL Cholesterol 74 2017 10:39 AM   LDL, calculated 131 (H) 2017 10:39 AM   Triglyceride 184 (H) 2017 10:39 AM   CHOL/HDL Ratio 2.9 10/09/2009 08:33 AM     Lab Results  Component Value Date/Time   TSH 1.400 2017 10:39 AM      Lab Results   Component Value Date/Time    Sodium 139 2017 10:39 AM    Potassium 4.9 09/13/2017 10:39 AM    Chloride 99 09/13/2017 10:39 AM    CO2 27 09/13/2017 10:39 AM    Anion gap 7 10/09/2009 08:33 AM    Glucose 108 (H) 09/13/2017 10:39 AM    BUN 17 09/13/2017 10:39 AM    Creatinine 0.99 09/13/2017 10:39 AM    BUN/Creatinine ratio 17 09/13/2017 10:39 AM    GFR est AA 75 09/13/2017 10:39 AM    GFR est non-AA 65 09/13/2017 10:39 AM    Calcium 10.2 10/05/2017 03:30 PM    Bilirubin, total 1.2 05/05/2016 03:00 PM    ALT (SGPT) 17 02/28/2017 08:49 AM    AST (SGOT) 17 02/28/2017 08:49 AM    Alk. phosphatase 69 05/05/2016 03:00 PM    Protein, total 7.0 05/05/2016 03:00 PM    Albumin 4.5 05/05/2016 03:00 PM    Globulin 3.5 03/20/2009 10:23 AM    A-G Ratio 1.8 05/05/2016 03:00 PM      Lab Results   Component Value Date/Time    Hemoglobin A1c 5.8 (H) 09/13/2017 10:39 AM    Hemoglobin A1c (POC) 6.0 03/14/2018 01:43 PM          ASSESSMENT & PLAN   Diagnoses and all orders for this visit:    1. Diabetes mellitus type 2, noninsulin dependent ([facility]  Assessment & Plan:  Stable, based on history, physical exam and review of pertinent labs, studies and medications; meds reconciled; lifestyle modifications recommended. Key Antihyperglycemic Medications     Patient is on no antihyperglycemic meds. Other Key Diabetic Medications     Patient is on no other key diabetic meds. Lab Results   Component Value Date/Time    Hemoglobin A1c 5.8 09/13/2017 10:39 AM    Hemoglobin A1c (POC) 6.0 03/14/2018 01:43 PM    Glucose 108 09/13/2017 10:39 AM    Creatinine 0.99 09/13/2017 10:39 AM    Microalb/Creat ratio (ug/mg creat.) <4.3 02/28/2017 08:49 AM    Cholesterol, total 242 09/13/2017 10:39 AM    HDL Cholesterol 74 09/13/2017 10:39 AM    LDL, calculated 131 09/13/2017 10:39 AM    Triglyceride 184 09/13/2017 10:39 AM     Diabetic Foot and Eye Exam HM Status   Topic Date Due    Diabetic Foot Care  10/31/2018     Eye Exam  10/31/2018        Orders:  -     AMB POC HEMOGLOBIN A1C    2.  H/O Borderline hypertension    3. Mixed Hyperlipidemia      Lab results and schedule of future lab studies reviewed with patient  Cardiovascular risk and specific lipid/LDL/BS/A1c goals reviewed  She had biometric screening at work yesterday and brought in a copy of those results (scanned to EMR)  5 hour fasting results:  HDL 76  Total 269  Glucose 89  The HgBA1c was not checked yesterday so her AMB POC HgbA1c in office today was 6.0 which is up slightly from 5.8 last check 6 months ago. Reviewed diet, exercise and weight control; Patient counseled about current BMI, goals, diet, nutrition, exercise, weight management. Nutrition/meal planning discussed. Monitor weights. Appropriate patient education materials included with or without corresponding AVS via handout or [employer] if activated. Reassess at next follow up visit. She met w/ a dietician monthly for an entire year last year. Patient disappointed she has now regained the 30 lbs she lost w/ healthier diet and regular exercise; not complying w/ either the past several months  She states that her eating habits are very much a \"behavioral problem\" since her childhood and that she feels she has some unresolved/suppressed issues that she relates to eating as her escape. I have referred her for counseling and she is in agreement that this would be a great route to pursue at this point. She has been intolerant of Metformin and tried Wellbutrin several years ago w/o success. Specific diabetic recommendations: foot care discussed and Podiatry visits discussed, annual eye examinations at Ophthalmology discussed and long term diabetic complications discussed   RTC for fasting CPE 9-2018.  Follow up sooner prn

## 2018-09-05 ENCOUNTER — OFFICE VISIT (OUTPATIENT)
Dept: FAMILY MEDICINE CLINIC | Age: 53
End: 2018-09-05

## 2018-09-05 VITALS
DIASTOLIC BLOOD PRESSURE: 88 MMHG | BODY MASS INDEX: 37.18 KG/M2 | TEMPERATURE: 99.2 F | HEIGHT: 69 IN | OXYGEN SATURATION: 98 % | WEIGHT: 251 LBS | SYSTOLIC BLOOD PRESSURE: 132 MMHG | HEART RATE: 76 BPM | RESPIRATION RATE: 18 BRPM

## 2018-09-05 DIAGNOSIS — S40.862A INSECT BITE OF LEFT UPPER ARM WITH LOCAL REACTION, INITIAL ENCOUNTER: Primary | ICD-10-CM
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
## 2018-09-05 RX ORDER — TRIAMCINOLONE ACETONIDE 1 MG/G
CREAM TOPICAL 2 TIMES DAILY
Qty: 15 G | Refills: 0 | Status: SHIPPED | OUTPATIENT
Start: 2018-09-05 | End: 2018-09-12

## 2018-09-05 NOTE — PROGRESS NOTES
[facility] Note  Subjective:      Radha Ohara is a 46 y.o. female who presents for an acute visit with the following chief complaints. Chief Complaint   Patient presents with   [address]     Pt states yesterday she was bitten by an ant on her left inner lower arm , right knee, and right lower arm. areas are red and warm to touch. +itching and stinging. red areas are getting bigger. pt using Cortisone 10 cream.     Radha Ohara is an 46 y.o. female who presents for evaluation of a skin reaction located on the left upper forearm, right forearm and right leg above knee. Onset of symptoms was abrupt, with rapidly worsening since that time. Inciting event yesterday afternoon: She was gardening and felt a sharp stinging pain of left arm, she swatted away a black ant, she then felt the ant bite her on the right arm and left knee. She had an immediate small area of redness and swelling in each of these sites, about the size of a mosquito bite. She feels confident it was an aunt and not a spider, she saw an ant nest in the garden when she was working. The area of reddness and swelling grew gradually over the past 12 -24 hours. The left arm is the worse. Associated symptoms include intense itching, swelling, warmth and mild tenderness. Denies pain, fever, chills, drainage, nausea and vomiting. Denies CP, SOB, dizziness, headaches, fatigue, malaise or change in appetite. There is not a history trauma to the area. Treatment to date has included OTC Hydrocortisone 1% cream with minimal relief. She states she is allergic to oral steroids. She was given a steroid taper in the past for a knee injury and it caused her to have chest tightness, malaise and made her feel awful. She is not sure of the exact name of the medication but is certain it was an oral steroid.        Current Outpatient Prescriptions   Medication Sig Dispense Refill    triamcinolone acetonide (KENALOG) 0.1 % topical cream Apply  to affected area two (2) times a day for 7 days. use thin layer 15 g 0    cholecalciferol, vitamin D3, (VITAMIN D3) 2,000 unit tab Take  by mouth daily.  polyethylene glycol (MIRALAX) 17 gram/dose powder MIX AND DRINK 17G BY MOUTH DAILY* NO SUB PERRIGO** 527 g prn     Allergies   Allergen Reactions    Metformin Other (comments)     Severe depression and coldness in arms    Bleach (Sodium Hypochlorite) Other (comments)    Prednisone Other (comments)     SOB, flushed, red rash       ROS:   Complete review of systems was reviewed with pertinent information listed in HPI. Review of Systems   Constitutional: Negative for chills, fever, malaise/fatigue and weight loss. HENT: Negative for congestion and sore throat. Eyes: Negative for blurred vision and double vision. Respiratory: Negative for cough, shortness of breath, wheezing and stridor. Cardiovascular: Negative for chest pain and palpitations. Gastrointestinal: Negative for abdominal pain, diarrhea, nausea and vomiting. Genitourinary: Negative for dysuria. Musculoskeletal: Negative for joint pain and myalgias. Skin: Positive for itching and rash. Neurological: Negative for dizziness, sensory change, weakness and headaches. Psychiatric/Behavioral: Negative for depression. The patient is not nervous/anxious and does not have insomnia. Objective:     Visit Vitals    /88 (BP 1 Location: Left arm, BP Patient Position: Sitting)    Pulse 76    Temp 99.2 °F (37.3 °C) (Oral)    Resp 18    Ht 5' 9\" (1.753 m)    Wt 251 lb (113.9 kg)    LMP 03/16/2013    SpO2 98%    BMI 37.07 kg/m2       Vitals and Nurse Documentation reviewed. Physical Exam   Constitutional: She is oriented to person, place, and time and well-developed, well-nourished, and in no distress. HENT:   Head: Normocephalic and atraumatic. Cardiovascular: Normal rate.     Pulmonary/Chest: Effort normal.   Musculoskeletal: She exhibits no edema.   Lymphadenopathy:     She has no axillary adenopathy. Neurological: She is alert and oriented to person, place, and time. Gait normal.   Skin: Skin is warm and dry. Rash noted. She is not diaphoretic. There is erythema. Psychiatric: Mood and affect normal.   Nursing note and vitals reviewed. Left Arm:      Right arm:      Right Superior knee          Assessment/Plan:     Diagnoses and all orders for this visit:    1. Insect bite of left upper arm with local reaction, initial encounter: Acute large localized reaction of insect bite, which she is certain was a black ant, while gardening. No signs of systemic reaction, and she has had adverse reactions to oral steroids in the past. Start topical steroids, apply BID for 7 days. Do not exceed 10 days, do not use on face. Start application of cool compress/ice 3-4 times daily for 15-20 minutes. Apply calamine lotion as needed for itching, discomfort. Zyrtec daily as needed for itching, Benadryl nightly as needed for intense itching. Discussed to monitor for signs of infection and return if symptoms develop. RTC if symptoms worsen or do not resolve. -     triamcinolone acetonide (KENALOG) 0.1 % topical cream; Apply  to affected area two (2) times a day for 7 days. use thin layer    Follow-up Disposition:  Return if symptoms worsen or fail to improve. She is overdue for follow-up with PCP. Encouraged her to schedule appointment today, she states understanding.      Discussed expected course/resolution/complications of diagnosis in detail with patient.    Medication risks/benefits/costs/interactions/alternatives discussed with patient.    Pt was given an after visit summary which includes diagnoses, current medications & vitals.  Pt expressed understanding with the diagnosis and plan

## 2018-09-05 NOTE — PATIENT INSTRUCTIONS
Apply Ice to left arm 3-4 times daily for 15-20 minutes. Apply steroid cream twice daily for 7 days. You can also apply calamine lotion in between as needed for itching. Zyrtec 1 tablet daily as needed for itching. Benadryl 1 tablet as needed at night for itching. Aleve 1 tablet as needed for pain. Please continue to monitor redness and swelling. Please return if symptoms worsen or do not improve. Look for signs of infection: increase in pain, drainage and return if symptoms develop.

## 2018-09-05 NOTE — PROGRESS NOTES
Cc  \"REVIEWED RECORD IN PREPARATION FOR VISIT AND HAVE OBTAINED THE NECESSARY DOCUMENTATION\"  1. Have you been to the ER, urgent care clinic since your last visit? Hospitalized since your last visit? No    2. Have you seen or consulted any other health care providers outside of the [address] [place] [address] since your last visit? Include any pap smears or colon screening.  No

## 2018-09-05 NOTE — MR AVS SNAPSHOT
[address] 
[phone] Patient: Kat Rashid MRN: [id number] :1965 Visit Information Date & Time Provider Department Dept. Phone Encounter #  
 2018 11:00 AM Meche Burns  [facility] [phone] [id number] Follow-up Instructions Return if symptoms worsen or fail to improve. Upcoming Health Maintenance Date Due FOBT Q 1 YEAR AGE 50-75 2014 HEMOGLOBIN A1C Q6M 2018 PAP AKA CERVICAL CYTOLOGY 9/15/2018 FOOT EXAM Q1 10/31/2018* MICROALBUMIN Q1 10/31/2018* EYE EXAM RETINAL OR DILATED Q1 10/31/2018* Influenza Age 5 to Adult 3/31/2019* LIPID PANEL Q1 3/14/2019 BREAST CANCER SCRN MAMMOGRAM 2019 DTaP/Tdap/Td series (2 - Td) 2027 *Topic was postponed. The date shown is not the original due date. Allergies as of 2018  Review Complete On: 2018 By: Ap Del Castillo LPN Severity Noted Reaction Type Reactions Metformin High 2017   Side Effect Other (comments) Severe depression and coldness in arms Bleach (Sodium Hypochlorite)  2017    Other (comments) Prednisone  2016    Other (comments) SOB, flushed, red rash Current Immunizations  Reviewed on 2015 Name Date  
 TD Vaccine 3/20/2009 Not reviewed this visit You Were Diagnosed With   
  
 Codes Comments Insect bite of left upper arm with local reaction, initial encounter    -  Primary ICD-10-CM: D21.265B, M62. Abdifatah Paz ICD-9-CM: 912.4, E906.4 Vitals BP Pulse Temp Resp Height(growth percentile) Weight(growth percentile) 132/88 (BP 1 Location: Left arm, BP Patient Position: Sitting) 76 99.2 °F (37.3 °C) (Oral) 18 5' 9\" (1.753 m) 251 lb (113.9 kg) LMP SpO2 BMI OB Status Smoking Status 2013 98% 37.07 kg/m2 Hysterectomy Never Smoker BMI and BSA Data Body Mass Index Body Surface Area  
 37.07 kg/m 2 2.35 m 2 Preferred Pharmacy Pharmacy Name Phone [phone], [address] [facility] [address] [place] [address] [phone] Your Updated Medication List  
  
   
This list is accurate as of 9/5/18 11:31 AM.  Always use your most recent med list.  
  
  
  
  
 polyethylene glycol 17 gram/dose powder Commonly known as:  Cori Floor MIX AND DRINK 17G BY MOUTH DAILY* NO SUB PERRIGO**  
  
 triamcinolone acetonide 0.1 % topical cream  
Commonly known as:  KENALOG Apply  to affected area two (2) times a day for 7 days. use thin layer VITAMIN D3 2,000 unit Tab Generic drug:  cholecalciferol (vitamin D3) Take  by mouth daily. Prescriptions Sent to Pharmacy Refills  
 triamcinolone acetonide (KENALOG) 0.1 % topical cream 0 Sig: Apply  to affected area two (2) times a day for 7 days. use thin layer Class: Normal  
 Pharmacy: [id number] [address] [place] [address] [address] Ph #: [phone] Route: Topical  
  
Follow-up Instructions Return if symptoms worsen or fail to improve. Patient Instructions Apply Ice to left arm 3-4 times daily for 15-20 minutes. Apply steroid cream twice daily for 7 days. You can also apply calamine lotion in between as needed for itching. Zyrtec 1 tablet daily as needed for itching. Benadryl 1 tablet as needed at night for itching. Aleve 1 tablet as needed for pain. Please continue to monitor redness and swelling. Please return if symptoms worsen or do not improve. Look for signs of infection: increase in pain, drainage and return if symptoms develop. Introducing [facility]! Dear Lily Read: Thank you for requesting a [employer] account. Our records indicate that you already have an active [employer] account.   You can access your account anytime at [email or web address] Did you know that you can access your hospital and ER discharge instructions at any time in [facility]? You can also review all of your test results from your hospital stay or ER visit. Additional Information If you have questions, please visit the Frequently Asked Questions section of the [facility] website at [email or web address] Remember, [facility] is NOT to be used for urgent needs. For medical emergencies, dial 911. Now available from your iPhone and Android! Please provide this summary of care documentation to your next provider. Your primary care clinician is listed as HIREN GRAMAJO. If you have any questions after today's visit, please call [phone].

## 2018-12-03 ENCOUNTER — OFFICE VISIT (OUTPATIENT)
Dept: FAMILY MEDICINE CLINIC | Age: 53
End: 2018-12-03

## 2018-12-03 VITALS
BODY MASS INDEX: 36.88 KG/M2 | HEART RATE: 64 BPM | WEIGHT: 249 LBS | RESPIRATION RATE: 18 BRPM | HEIGHT: 69 IN | OXYGEN SATURATION: 97 % | DIASTOLIC BLOOD PRESSURE: 92 MMHG | SYSTOLIC BLOOD PRESSURE: 126 MMHG | TEMPERATURE: 98.6 F

## 2018-12-03 DIAGNOSIS — Z00.00 ROUTINE GENERAL MEDICAL EXAMINATION AT A HEALTH CARE FACILITY: Primary | ICD-10-CM

## 2018-12-03 DIAGNOSIS — R03.0 BORDERLINE HYPERTENSION: ICD-10-CM

## 2018-12-03 DIAGNOSIS — E11.9 DIABETES MELLITUS TYPE 2, NONINSULIN DEPENDENT (HCC): ICD-10-CM

## 2018-12-03 DIAGNOSIS — E78.2 MIXED HYPERLIPIDEMIA: ICD-10-CM

## 2018-12-03 NOTE — PROGRESS NOTES
Chief Complaint   Patient presents with    Complete Physical     fasting - has gyn for pap - advised to schedule as she is due     1. Have you been to the ER, urgent care clinic since your last visit? Hospitalized since your last visit? No    2. Have you seen or consulted any other health care providers outside of the [address] [place] [address] since your last visit? Include any pap smears or colon screening.  No

## 2018-12-03 NOTE — PATIENT INSTRUCTIONS
Learning About Diabetes Food Guidelines  Your Care Instructions    Meal planning is important to manage diabetes. It helps keep your blood sugar at a target level (which you set with your doctor). You don't have to eat special foods. You can eat what your family eats, including sweets once in a while. But you do have to pay attention to how often you eat and how much you eat of certain foods. You may want to work with a dietitian or a certified diabetes educator (CDE) to help you plan meals and snacks. A dietitian or CDE can also help you lose weight if that is one of your goals. What should you know about eating carbs? Managing the amount of carbohydrate (carbs) you eat is an important part of healthy meals when you have diabetes. Carbohydrate is found in many foods. · Learn which foods have carbs. And learn the amounts of carbs in different foods. ? Bread, cereal, pasta, and rice have about 15 grams of carbs in a serving. A serving is 1 slice of bread (1 ounce), ½ cup of cooked cereal, or 1/3 cup of cooked pasta or rice. ? Fruits have 15 grams of carbs in a serving. A serving is 1 small fresh fruit, such as an apple or orange; ½ of a banana; ½ cup of cooked or canned fruit; ½ cup of fruit juice; 1 cup of melon or raspberries; or 2 tablespoons of dried fruit. ? Milk and no-sugar-added yogurt have 15 grams of carbs in a serving. A serving is 1 cup of milk or 2/3 cup of no-sugar-added yogurt. ? Starchy vegetables have 15 grams of carbs in a serving. A serving is ½ cup of mashed potatoes or sweet potato; 1 cup winter squash; ½ of a small baked potato; ½ cup of cooked beans; or ½ cup cooked corn or green peas. · Learn how much carbs to eat each day and at each meal. A dietitian or CDE can teach you how to keep track of the amount of carbs you eat. This is called carbohydrate counting. · If you are not sure how to count carbohydrate grams, use the Plate Method to plan meals.  It is a good, quick way to make sure that you have a balanced meal. It also helps you spread carbs throughout the day. ? Divide your plate by types of foods. Put non-starchy vegetables on half the plate, meat or other protein food on one-quarter of the plate, and a grain or starchy vegetable in the final quarter of the plate. To this you can add a small piece of fruit and 1 cup of milk or yogurt, depending on how many carbs you are supposed to eat at a meal.  · Try to eat about the same amount of carbs at each meal. Do not \"save up\" your daily allowance of carbs to eat at one meal.  · Proteins have very little or no carbs per serving. Examples of proteins are beef, chicken, turkey, fish, eggs, tofu, cheese, cottage cheese, and peanut butter. A serving size of meat is 3 ounces, which is about the size of a deck of cards. Examples of meat substitute serving sizes (equal to 1 ounce of meat) are 1/4 cup of cottage cheese, 1 egg, 1 tablespoon of peanut butter, and ½ cup of tofu. How can you eat out and still eat healthy? · Learn to estimate the serving sizes of foods that have carbohydrate. If you measure food at home, it will be easier to estimate the amount in a serving of restaurant food. · If the meal you order has too much carbohydrate (such as potatoes, corn, or baked beans), ask to have a low-carbohydrate food instead. Ask for a salad or green vegetables. · If you use insulin, check your blood sugar before and after eating out to help you plan how much to eat in the future. · If you eat more carbohydrate at a meal than you had planned, take a walk or do other exercise. This will help lower your blood sugar. What else should you know? · Limit saturated fat, such as the fat from meat and dairy products. This is a healthy choice because people who have diabetes are at higher risk of heart disease. So choose lean cuts of meat and nonfat or low-fat dairy products.  Use olive or canola oil instead of butter or shortening when cooking. · Don't skip meals. Your blood sugar may drop too low if you skip meals and take insulin or certain medicines for diabetes. · Check with your doctor before you drink alcohol. Alcohol can cause your blood sugar to drop too low. Alcohol can also cause a bad reaction if you take certain diabetes medicines. Follow-up care is a key part of your treatment and safety. Be sure to make and go to all appointments, and call your doctor if you are having problems. It's also a good idea to know your test results and keep a list of the medicines you take. Where can you learn more? Go to http://demi-kellie.info/. Enter D552 in the search box to learn more about \"Learning About Diabetes Food Guidelines. \"  Current as of: December 7, 2017  Content Version: 11.8  © 2880-8703 [employer]. Care instructions adapted under license by [employer] (which disclaims liability or warranty for this information). If you have questions about a medical condition or this instruction, always ask your healthcare professional. [place] [address] any warranty or liability for your use of this information. Diabetes Foot Health: Care Instructions  Your Care Instructions    When you have diabetes, your feet need extra care and attention. Diabetes can damage the nerve endings and blood vessels in your feet, making you less likely to notice when your feet are injured. Diabetes also limits your body's ability to fight infection and get blood to areas that need it. If you get a minor foot injury, it could become an ulcer or a serious infection. With good foot care, you can prevent most of these problems. Caring for your feet can be quick and easy. Most of the care can be done when you are bathing or getting ready for bed. Follow-up care is a key part of your treatment and safety. Be sure to make and go to all appointments, and call your doctor if you are having problems.  It's also a good idea to know your test results and keep a list of the medicines you take. How can you care for yourself at home? · Keep your blood sugar close to normal by watching what and how much you eat, monitoring blood sugar, taking medicines if prescribed, and getting regular exercise. · Do not smoke. Smoking affects blood flow and can make foot problems worse. If you need help quitting, talk to your doctor about stop-smoking programs and medicines. These can increase your chances of quitting for good. · Eat a diet that is low in fats. High fat intake can cause fat to build up in your blood vessels and decrease blood flow. · Inspect your feet daily for blisters, cuts, cracks, or sores. If you cannot see well, use a mirror or have someone help you. · Take care of your feet:  ? Wash your feet every day. Use warm (not hot) water. Check the water temperature with your wrists or other part of your body, not your feet. ? Dry your feet well. Pat them dry. Do not rub the skin on your feet too hard. Dry well between your toes. If the skin on your feet stays moist, bacteria or a fungus can grow, which can lead to infection. ? Keep your skin soft. Use moisturizing skin cream to keep the skin on your feet soft and prevent calluses and cracks. But do not put the cream between your toes, and stop using any cream that causes a rash. ? Clean underneath your toenails carefully. Do not use a sharp object to clean underneath your toenails. Use the blunt end of a nail file or other rounded tool. ? Trim and file your toenails straight across to prevent ingrown toenails. Use a nail clipper, not scissors. Use an emery board to smooth the edges. · Change socks daily. Socks without seams are best, because seams often rub the feet. You can find socks for people with diabetes from specialty catalogs. · Look inside your shoes every day for things like gravel or torn linings, which could cause blisters or sores.   · Buy shoes that fit well:  ? Look for shoes that have plenty of space around the toes. This helps prevent bunions and blisters. ? Try on shoes while wearing the kind of socks you will usually wear with the shoes. ? Avoid plastic shoes. They may rub your feet and cause blisters. Good shoes should be made of materials that are flexible and breathable, such as leather or cloth. ? Break in new shoes slowly by wearing them for no more than an hour a day for several days. Take extra time to check your feet for red areas, blisters, or other problems after you wear new shoes. · Do not go barefoot. Do not wear sandals, and do not wear shoes with very thin soles. Thin soles are easy to puncture. They also do not protect your feet from hot pavement or cold weather. · Have your doctor check your feet during each visit. If you have a foot problem, see your doctor. Do not try to treat an early foot problem at home. Home remedies or treatments that you can buy without a prescription (such as corn removers) can be harmful. · Always get early treatment for foot problems. A minor irritation can lead to a major problem if not properly cared for early. When should you call for help? Call your doctor now or seek immediate medical care if:    · You have a foot sore, an ulcer or break in the skin that is not healing after 4 days, bleeding corns or calluses, or an ingrown toenail.     · You have blue or black areas, which can mean bruising or blood flow problems.     · You have peeling skin or tiny blisters between your toes or cracking or oozing of the skin.     · You have a fever for more than 24 hours and a foot sore.     · You have new numbness or tingling in your feet that does not go away after you move your feet or change positions.     · You have unexplained or unusual swelling of the foot or ankle.    Watch closely for changes in your health, and be sure to contact your doctor if:    · You cannot do proper foot care.    Where can you learn more?  Go to http://demi-kellie.info/. Enter A739 in the search box to learn more about \"Diabetes Foot Health: Care Instructions. \"  Current as of: December 7, 2017  Content Version: 11.8  © 6836-3937 [employer]. Care instructions adapted under license by [employer] (which disclaims liability or warranty for this information). If you have questions about a medical condition or this instruction, always ask your healthcare professional. [address] any warranty or liability for your use of this information.

## 2018-12-03 NOTE — PROGRESS NOTES
HISTORY OF PRESENT ILLNESS   HPI  Annual CPE, fasting and follow up type 2 diabetes, hyperlipidemia, labs. Overall has been getting along well and feeling pretty well in general.  She admits that her eating habits are still poor, because she eats what she wants to eat and loves carbs/sugars. She has not been exercising as regularly for close to a year now. Had been dealing w/ sciatica and some other orthopedic issues and is finally trying to get back on track. Has started walking sporadically a few x a month, but nothing routine. Her wt and home BS's are up. Checks BS's a few x a week. Fasting in the AM typically runs 120-130, lowest ~110 and highest 141. Pre lunch or dinner runs in the 110's  Bedtime runs in the 140's  BP's have been in the 120's/80's range         REVIEW OF SYMPTOMS     Review of Systems   Constitutional: Negative. HENT: Negative. Eyes: Negative for blurred vision and double vision. Respiratory: Negative. Cardiovascular: Negative. Gastrointestinal: Negative. Genitourinary: Negative. Neurological: Negative. Endo/Heme/Allergies: Negative.     Psychiatric/Behavioral: Negative.            PROBLEM LIST/MEDICAL HISTORY      Problem List  Date Reviewed: 12/3/2018          Codes Class Noted    Mixed hyperlipidemia ICD-10-CM: E78.2  ICD-9-CM: 272.2  3/14/2018        Diabetes mellitus type 2, noninsulin dependent ([facility] ICD-10-CM: E11.9  ICD-9-CM: 250.00  12/27/2016    Overview Signed 12/27/2016  2:21 PM by Lani Mclaughlin MD     11-28-16             Tear of medial meniscus of left knee ICD-10-CM: P11.357Y  ICD-9-CM: 836.0  11/28/2016    Overview Signed 11/28/2016 10:42 AM by Lani Mclaughlin MD     ~ Spring 2016, Ortho Dr Tay Mena, scope scheduled 12-13-16             Borderline hypertension ICD-10-CM: R03.0  ICD-9-CM: 796.2  11/28/2016        Screening exams for skin cancer ICD-10-CM: Z12.83  ICD-9-CM: V76.43  5/5/2016    Overview Signed 5/5/2016  2:38 PM by Steven Pruitt MD     Derm Dr Quintana             Right carpal tunnel syndrome ICD-10-CM: G56.01  ICD-9-CM: 354.0  2015    Overview Addendum 2015 11:04 AM by Steven Pruitt MD     ~ 2013 Dr Lawanda Dicker Dr Jacob Landau: EMG study + Impression:  The above electrodiagnostic study reveals evidence of mildly prolonged motor and sensory distal latency of right median nerve, suggesting mild median nerve entrapment at wrist.             Vertigo ICD-10-CM: R42  ICD-9-CM: 780.4  6/3/2013        Vitamin D deficiency ICD-10-CM: E55.9  ICD-9-CM: 268.9  2012    Overview Signed 2012  1:42 PM by Steven Pruitt MD     2012             History of low back pain, DDD, mild scoliosis and mild-moderate disc protrusions ICD-10-CM: Z87.39  ICD-9-CM: V13.59  2012        Mild-moderate lumbar disc protrusions L4-5, L5-S1, mild scoliosis ICD-10-CM: M51.26  ICD-9-CM: 722.10  2012    Overview Signed 2012  3:01 PM by Steven Pruitt MD     2007             Abnormal Pap smear s/p Cryotherapy ~  ICD-10-CM: [id number]  ICD-9-CM: PCL1910  2010    Overview Signed 2010  8:53 AM by Steven Pruitt MD     GYN Dr Isidra Saul (normal spontaneous vaginal delivery) ICD-10-CM: O80  ICD-9-CM: 200  Unknown    Overview Signed 3/22/2010 10:24 AM by Steven Pruitt MD     A2,  x2, SAB x2             Achilles tendonitis ICD-10-CM: M76.60  ICD-9-CM: 726.71  3/1/2010    Overview Signed 3/1/2010 11:14 AM by Fam Rogers     L>R s/p cortisone             Anxiety ICD-10-CM: F41.9  ICD-9-CM: 300.00  3/1/2010    Overview Signed 3/1/2010 11:14 AM by Fam Rogers     mild             H/O Abnormal Pap Smears 1998 ICD-9-CM: 796.9  3/1/2010    Overview Signed 3/1/2010 11:17 AM by Fam Parisr     8950'M and  (no Bx)             Congenital hip dysplasia ICD-10-CM: Q65.89  ICD-9-CM: 755.63  3/1/2010    Overview Signed 3/1/2010 11:18 AM by Akash Almeida Ketchum     Left. Recontruction, bone grafting 1969             Strep throat ICD-10-CM: J02.0  ICD-9-CM: 034.0  9/1/2009        Scoliosis ICD-10-CM: M41.9  ICD-9-CM: 737.30  8/1/2007    Overview Signed 3/1/2010 11:15 AM by Red Bulgarian     mild             Hip pain ICD-10-CM: M25.559  ICD-9-CM: 719.45  8/1/2007    Overview Signed 3/1/2010 11:15 AM by Red Bulgarian     right             S/P Surgery for Right Lateral Epicondylitis, 2005 ICD-10-CM: [id number]  ICD-9-CM: 726.32  1/1/2005    Overview Addendum 5/8/2012  3:03 PM by Trevor Lockhart MD     TOA             S/P LASIK surgery ICD-10-CM: J71.557  ICD-9-CM: V45.69  1/1/2005        GERD (gastroesophageal reflux disease) ICD-10-CM: K21.9  ICD-9-CM: 530.81  1/1/2003    Overview Addendum 5/8/2012  3:01 PM by Trevor Lockhart MD     [place] [place]                       PAST SURGICAL HISTORY       Past Surgical History:   Procedure Laterality Date    CRYOCAUTERY OF CERVIX  ~11/2008    Dr Kolby Wheeler    right 3rd finger tendon rupture repair    HX COLONOSCOPY  4/19/2016, Dr Zeus Masterson    Polyp: tubular adenoma, repeat 3 yrs    HX KNEE ARTHROSCOPY Left 12/13/2016    related to torn meniscus    HX PARTIAL HYSTERECTOMY  4/11/13    Dr Samaniego Alt    congenital hip dysplasia, left hip reconstruction and bone grafting    REPAIR ACHILLES TENDON,PRIMARY  10/09    R Achilles; Dr. Ryan [facility]  2005    right lateral epicondylitis surgery; TOA         MEDICATIONS      Current Outpatient Medications   Medication Sig    polyethylene glycol (MIRALAX) 17 gram/dose powder MIX AND DRINK 17G BY MOUTH DAILY* NO SUB PERRIGO**    cholecalciferol, vitamin D3, (VITAMIN D3) 2,000 unit tab Take  by mouth daily. No current facility-administered medications for this visit.            ALLERGIES     Allergies   Allergen Reactions    Metformin Other (comments)     Severe depression and coldness in arms    Bleach (Sodium Hypochlorite) Other (comments)    Prednisone Other (comments)     SOB, flushed, red rash          SOCIAL HISTORY       Social History     Socioeconomic History    Marital status:      Spouse name: Not on file    Number of children: 2    Years of education: Not on file    Highest education level: Not on file   Social Needs    Financial resource strain: Not on file    Food insecurity - worry: Not on file    Food insecurity - inability: Not on file   HouseCall needs - medical: Not on file   HouseCall needs - non-medical: Not on file   Occupational History    Occupation: Speech Pathologist for [employer]   Tobacco Use    Smoking status: Never Smoker    Smokeless tobacco: Never Used   Substance and Sexual Activity    Alcohol use:  Yes     Alcohol/week: 0.0 oz     Comment: maybe 2-3 drinks of wine/cocktails per year    Drug use: No    Sexual activity: Yes     Partners: Male     Birth control/protection: Surgical     Comment: Hysterectomy 2013   Other Topics Concern     Service Not Asked    Blood Transfusions Not Asked    Caffeine Concern No     Comment: 1 cup of coffee a day maybe 4-5 days a week    Occupational Exposure Not Asked    Hobby Hazards Not Asked    Sleep Concern Not Asked    Stress Concern Not Asked    Weight Concern Yes     Comment: has regained the wt she lost when she was eating healthier and exercising more    Special Diet No     Comment: nothing special lately; had met w/ dietician 12/2016-12/2017; knows what she is supposed to do but has not been eating healthy, loves sweets    Back Care Not Asked    Exercise No     Comment: nothing regular since ~; sicne this summer tries to walk a few x a month    Bike Helmet Not Asked   [address] Not Asked    Self-Exams Not Asked   Social History Narrative    Native of [place]; [place] ;  ~; Got re- 2012    Recalls getting part of her Hepatitis B series in the , not sure if she got #3        IMMUNIZATIONS  Immunization History   Administered Date(s) Administered    TD Vaccine 2009         FAMILY HISTORY     Family History   Problem Relation Age of Onset    Arrhythmia Mother     Bipolar Disorder Mother     Cancer Mother         cervical dx in her late 29's   Maureen.Sic High Cholesterol Mother     Heart Disease Mother         heart arrhytmia    Other Mother         brain tumor diagnosed at age 67, stable    Diabetes Father         obesity    Arthritis-osteo Father         ? RA as well    Cancer Father         skin cancer    Gout Father     Heart Disease Maternal Grandmother         CHF in her 63's    Heart Disease Maternal Grandfather          81yo    Hypertension Maternal Grandfather     Diabetes Paternal Grandmother     Stroke Paternal Grandmother          in her [de-identified]    Arthritis-rheumatoid Paternal Grandmother     Cancer Paternal Grandfather         liver and colon CA,  mid 66's    Diabetes Paternal Grandfather     Depression Sister     Anxiety Sister     Other Sister         scoliosis    Other Daughter         Tourettes, tremor, tics, milk/soy allergy, lactose intolerance    Other Daughter         Tourettes, anxiety    Breast Cancer Paternal Aunt         dx in her 29's    Diabetes Paternal Aunt         type 2    Diabetes Paternal Uncle         type 2    Arthritis-rheumatoid Paternal Aunt     Gout Paternal Aunt          VITALS     Visit Vitals  BP (!) 126/92 (BP 1 Location: Left arm, BP Patient Position: Sitting)   Pulse 64   Temp 98.6 °F (37 °C) (Oral)   Resp 18   Ht 5' 9\" (1.753 m)   Wt 249 lb (112.9 kg)   LMP 2013   SpO2 97%   BMI 36.77 kg/m²          PHYSICAL EXAMINATION     Physical Exam   Constitutional: She is well-developed, well-nourished, and in no distress.    HENT:   Right Ear: Tympanic membrane normal.   Left Ear: Tympanic membrane normal.   Nose: Nose normal.   Mouth/Throat: Oropharynx is clear and moist.   Eyes: Conjunctivae and EOM are normal. Pupils are equal, round, and reactive to light. Neck: Neck supple. No JVD present. Carotid bruit is not present. No thyromegaly present. Cardiovascular: Normal rate, regular rhythm, normal heart sounds and intact distal pulses. No murmur heard. Pulmonary/Chest: Effort normal and breath sounds normal.   Abdominal: Soft. Bowel sounds are normal. She exhibits no distension and no mass. There is no tenderness. Musculoskeletal: She exhibits no edema or tenderness. Lymphadenopathy:     She has no cervical adenopathy. Neurological: She is alert. Gait normal.   Normal diabetic foot exam: Normal monofilament testing B. Skin warm, dry, intact. DP/PT pulses strong and intact. No skin breakdown, wounds, ulcers, lesions. Skin: Skin is warm. Psychiatric: Mood and affect normal.   Vitals reviewed.              ASSESSMENT & PLAN       ICD-10-CM ICD-9-CM    1. Routine general medical examination at a health care facility Z00.00 V70.0 LIPID PANEL      METABOLIC PANEL, COMPREHENSIVE      CBC W/O DIFF      TSH 3RD GENERATION      URINALYSIS W/ RFLX MICROSCOPIC      VITAMIN D, 25 HYDROXY   2. Diabetes mellitus type 2, noninsulin dependent (HCC) E11.9 250.00  DIABETES EYE EXAM       DIABETES FOOT EXAM      HEMOGLOBIN A1C WITH EAG      MICROALBUMIN, UR, RAND W/ MICROALB/CREAT RATIO      LIPID PANEL      METABOLIC PANEL, COMPREHENSIVE   3.  Mixed hyperlipidemia E78.2 272.2 LIPID PANEL   4. Borderline hypertension R03.0 796.2      Fasting labs today  Cardiovascular risk and specific lipid/LDL/BS/HgBA1c/BP goals reviewed  Specific diabetic recommendations: annual eye examinations at Ophthalmology discussed; patient given information for eye specialists and encouraged to schedule  Reviewed diet, nutrition, exercise, weight management, BMI/goals, age/risk based screening recommendations, health maintenance & prevention counseling. Due annual well woman w/ her Gyn. She will schedule  Her 3 yr follow up colonoscopy is set for spring 2019  Further follow up & other recommendations pending review of labs.  If all remains good and stable, follow up in 6months, sooner prn

## 2018-12-04 LAB
25(OH)D3+25(OH)D2 SERPL-MCNC: 35.9 NG/ML (ref 30–100)
ALBUMIN SERPL-MCNC: 4.4 G/DL (ref 3.5–5.5)
ALBUMIN/CREAT UR: 2.7 MG/G CREAT (ref 0–30)
ALBUMIN/GLOB SERPL: 1.6 {RATIO} (ref 1.2–2.2)
ALP SERPL-CCNC: 82 IU/L (ref 39–117)
ALT SERPL-CCNC: 44 IU/L (ref 0–32)
APPEARANCE UR: CLEAR
AST SERPL-CCNC: 31 IU/L (ref 0–40)
BILIRUB SERPL-MCNC: 1.3 MG/DL (ref 0–1.2)
BILIRUB UR QL STRIP: NEGATIVE
BUN SERPL-MCNC: 13 MG/DL (ref 6–24)
BUN/CREAT SERPL: 13 (ref 9–23)
CALCIUM SERPL-MCNC: 10 MG/DL (ref 8.7–10.2)
CHLORIDE SERPL-SCNC: 102 MMOL/L (ref 96–106)
CHOLEST SERPL-MCNC: 225 MG/DL (ref 100–199)
CO2 SERPL-SCNC: 22 MMOL/L (ref 20–29)
COLOR UR: YELLOW
CREAT SERPL-MCNC: 1.01 MG/DL (ref 0.57–1)
CREAT UR-MCNC: 187.1 MG/DL
ERYTHROCYTE [DISTWIDTH] IN BLOOD BY AUTOMATED COUNT: 14.8 % (ref 12.3–15.4)
EST. AVERAGE GLUCOSE BLD GHB EST-MCNC: 140 MG/DL
GLOBULIN SER CALC-MCNC: 2.8 G/DL (ref 1.5–4.5)
GLUCOSE SERPL-MCNC: 101 MG/DL (ref 65–99)
GLUCOSE UR QL: NEGATIVE
HBA1C MFR BLD: 6.5 % (ref 4.8–5.6)
HCT VFR BLD AUTO: 46.2 % (ref 34–46.6)
HDLC SERPL-MCNC: 65 MG/DL
HGB BLD-MCNC: 15.1 G/DL (ref 11.1–15.9)
HGB UR QL STRIP: NEGATIVE
INTERPRETATION, 910389: NORMAL
KETONES UR QL STRIP: NEGATIVE
LDLC SERPL CALC-MCNC: 129 MG/DL (ref 0–99)
LEUKOCYTE ESTERASE UR QL STRIP: NEGATIVE
MCH RBC QN AUTO: 28.7 PG (ref 26.6–33)
MCHC RBC AUTO-ENTMCNC: 32.7 G/DL (ref 31.5–35.7)
MCV RBC AUTO: 88 FL (ref 79–97)
MICRO URNS: NORMAL
MICROALBUMIN UR-MCNC: 5 UG/ML
NITRITE UR QL STRIP: NEGATIVE
PH UR STRIP: 6.5 [PH] (ref 5–7.5)
PLATELET # BLD AUTO: 260 X10E3/UL (ref 150–379)
POTASSIUM SERPL-SCNC: 4.8 MMOL/L (ref 3.5–5.2)
PROT SERPL-MCNC: 7.2 G/DL (ref 6–8.5)
PROT UR QL STRIP: NEGATIVE
RBC # BLD AUTO: 5.26 X10E6/UL (ref 3.77–5.28)
SODIUM SERPL-SCNC: 142 MMOL/L (ref 134–144)
SP GR UR: 1.02 (ref 1–1.03)
TRIGL SERPL-MCNC: 154 MG/DL (ref 0–149)
TSH SERPL DL<=0.005 MIU/L-ACNC: 1.97 UIU/ML (ref 0.45–4.5)
UROBILINOGEN UR STRIP-MCNC: 0.2 MG/DL (ref 0.2–1)
VLDLC SERPL CALC-MCNC: 31 MG/DL (ref 5–40)
WBC # BLD AUTO: 7.6 X10E3/UL (ref 3.4–10.8)

## 2018-12-11 ENCOUNTER — TELEPHONE (OUTPATIENT)
Dept: FAMILY MEDICINE CLINIC | Age: 53
End: 2018-12-11

## 2018-12-11 DIAGNOSIS — E78.2 MIXED HYPERLIPIDEMIA: ICD-10-CM

## 2018-12-11 DIAGNOSIS — E11.9 DIABETES MELLITUS TYPE 2, NONINSULIN DEPENDENT (HCC): Primary | ICD-10-CM

## 2018-12-11 DIAGNOSIS — R74.8 ELEVATED LIVER ENZYMES: ICD-10-CM

## 2018-12-12 NOTE — TELEPHONE ENCOUNTER
Urine, blood counts, thyroid, vit D all normal  Kidney fnc just very borderline, needs to push fluids and avoid nsaids  Liver enzyme mildly elevated which is most likely due to fatty liver and can reverse w/ low carb diet, exercise and wt loss. But this can also come from alcohol, meds, viral, autoimmune, or various other causes and will need to be followed up w/ additional testing  LDL about same at 129 and 131 a year ago. Goal <100  HDL remains very good at 65 and has been staying at goal >50 which is heart protective  Fasting BS good at 101 but HgBA1c has taken a significant jump from 5.8 to 6.5. We talked extensively at her appt about diet/exercise. She wants to avoid meds so in order to see improvement she needs to make those modifications. I would like her to have fasting recheck in 3months and see me a week later to review results/recs. I have pended fasting labs to get the week prior. Advise no nsaids or etoh at least 7-10 days prior.

## 2018-12-13 NOTE — TELEPHONE ENCOUNTER
PI of result, she was transferred to set up 3 month f/u. She will send mychart message 10 day prior to her appt.

## 2019-03-04 ENCOUNTER — OFFICE VISIT (OUTPATIENT)
Dept: FAMILY MEDICINE CLINIC | Age: 54
End: 2019-03-04

## 2019-03-04 VITALS
BODY MASS INDEX: 37.29 KG/M2 | SYSTOLIC BLOOD PRESSURE: 112 MMHG | HEART RATE: 70 BPM | TEMPERATURE: 99.2 F | WEIGHT: 251.8 LBS | DIASTOLIC BLOOD PRESSURE: 82 MMHG | RESPIRATION RATE: 18 BRPM | HEIGHT: 69 IN | OXYGEN SATURATION: 98 %

## 2019-03-04 DIAGNOSIS — E11.9 DIABETES MELLITUS TYPE 2, NONINSULIN DEPENDENT (HCC): Primary | ICD-10-CM

## 2019-03-04 DIAGNOSIS — E11.9 DIABETES MELLITUS TYPE 2, NONINSULIN DEPENDENT (HCC): ICD-10-CM

## 2019-03-04 DIAGNOSIS — E78.2 MIXED HYPERLIPIDEMIA: ICD-10-CM

## 2019-03-04 DIAGNOSIS — R74.8 ELEVATED LIVER ENZYMES: ICD-10-CM

## 2019-03-04 NOTE — PATIENT INSTRUCTIONS

## 2019-03-04 NOTE — PROGRESS NOTES
Chief Complaint Patient presents with  Hypertension  
  fasting follow up  Diabetes  Cholesterol Problem 1. Have you been to the ER, urgent care clinic since your last visit? Hospitalized since your last visit? No 
 
2. Have you seen or consulted any other health care providers outside of the [address] [place] [address] since your last visit? Include any pap smears or colon screening.  No

## 2019-03-04 NOTE — PROGRESS NOTES
Chief Complaint Patient presents with  Diabetes  
  fasting follow up  Hypertension  Cholesterol Problem History of Present Illness HPI Patient presents for follow up and/or evaluation/assessment of conditions outlined in A/P below. Diabetes 
-Diet/Exercise: Detailed in Social Hx below 
-Compliant with medications: No 
-Diabetes medication side effects: 
-Home Glucose Monitoring Frequency: daily a few x a week 
-Home Blood Sugar Readings: 
 Fastin (at work), 127, 127, usually 130's Pre-Meal: 100-110 
 2-3 hr Post Prandial: 137-140, 1 hr pp 147-160 Bedtime: 
 Symptomatic Low BS's: No 
-Diabetes Eye Exam: Due 
-Diabetes Foot Exam: Up to date 
-Other Related Specialists: ROS Review of Systems Constitutional: Negative. Eyes: Negative. Respiratory: Negative for shortness of breath. Cardiovascular: Negative. Gastrointestinal: Negative. Neurological: Negative. PROBLEM LIST/MEDICAL HISTORY Problem List  Date Reviewed: 3/4/2019 Codes Class Noted Mixed hyperlipidemia ICD-10-CM: E78.2 ICD-9-CM: 272.2  3/14/2018 Diabetes mellitus type 2, noninsulin dependent [facility] ICD-10-CM: E11.9 ICD-9-CM: 250.00  2016 Overview Signed 2016  2:21 PM by Rafa Dong MD  
  [birth year] Tear of medial meniscus of left knee ICD-10-CM: D18.882C ICD-9-CM: 836.0  2016 Overview Signed 2016 10:42 AM by Rafa Dong MD  
  ~ Spring 2016, Ortho Dr Marina De Los Santos, scope scheduled 16 Borderline hypertension ICD-10-CM: R03.0 ICD-9-CM: 796.2  2016 Screening exams for skin cancer ICD-10-CM: Z12.83 ICD-9-CM: V76.43  2016 Overview Signed 2016  2:38 PM by Rafa Dong MD  
  Derm Dr Quintana 
  
  
   
 Right carpal tunnel syndrome ICD-10-CM: G56.01 
ICD-9-CM: 354.0  2015  Overview Addendum 2015 11:04 AM by Rafa Dong MD  
 ~ 2013 Dr Raghav Velez: EMG study + Impression: 
The above electrodiagnostic study reveals evidence of mildly prolonged motor and sensory distal latency of right median nerve, suggesting mild median nerve entrapment at wrist. 
  
  
   
 Vertigo ICD-10-CM: R42 ICD-9-CM: 780.4  6/3/2013 Vitamin D deficiency ICD-10-CM: E55.9 ICD-9-CM: 268.9  2012 Overview Signed 2012  1:42 PM by Oren Avilez MD  
  2012 History of low back pain, DDD, mild scoliosis and mild-moderate disc protrusions ICD-10-CM: Z87.39 
ICD-9-CM: V13.59  2012 Mild-moderate lumbar disc protrusions L4-5, L5-S1, mild scoliosis ICD-10-CM: M51.26 
ICD-9-CM: 722.10  2012 Overview Signed 2012  3:01 PM by Oren Avilez MD  
  2007 Abnormal Pap smear s/p Cryotherapy ~  ICD-10-CM: [id number] ICD-9-CM: HSV3771  2010 Overview Signed 2010  8:53 AM by Oren Avilez MD  
  GYN Dr Link Bethea (normal spontaneous vaginal delivery) ICD-10-CM: O80 
ICD-9-CM: 639  Unknown Overview Signed 3/22/2010 10:24 AM by Oren Avilez MD  
  A2,  x2, SAB x2 Achilles tendonitis ICD-10-CM: M76.60 ICD-9-CM: 726.71  3/1/2010 Overview Signed 3/1/2010 11:14 AM by Summer Dawn  
  L>R s/p cortisone Anxiety ICD-10-CM: F41.9 ICD-9-CM: 300.00  3/1/2010 Overview Signed 3/1/2010 11:14 AM by Summer Dawn  
  mild H/O Abnormal Pap Smears ,  ICD-9-CM: 796.9  3/1/2010 Overview Signed 3/1/2010 11:17 AM by Summer Dawn  
  8254'X and  (no Bx) Congenital hip dysplasia ICD-10-CM: Q65.89 ICD-9-CM: 755.63  3/1/2010 Overview Signed 3/1/2010 11:18 AM by Summer Lees. Recontruction, bone grafting 1969 Scoliosis ICD-10-CM: M41.9 ICD-9-CM: 737.30  2007  Overview Signed 3/1/2010 11:15 AM by Summer Dawn  
 mild 
  
  
   
 Hip pain ICD-10-CM: M25.559 ICD-9-CM: 719.45  8/1/2007 Overview Signed 3/1/2010 11:15 AM by Chuck Delgado  
  right S/P Surgery for Right Lateral Epicondylitis, 2005 ICD-10-CM: [id number] ICD-9-CM: 726.32  1/1/2005 Overview Addendum 5/8/2012  3:03 PM by Satya Becker MD  
  TOA 
  
  
   
 S/P LASIK surgery ICD-10-CM: S47.955 ICD-9-CM: V45.69  1/1/2005 GERD (gastroesophageal reflux disease) ICD-10-CM: K21.9 ICD-9-CM: 530.81  1/1/2003 Overview Addendum 5/8/2012  3:01 PM by Satya Becker MD  
  [place] [place]GICAL HISTORY Past Surgical History:  
Procedure Laterality Date  CRYOCAUTERY OF CERVIX  ~11/2008 Dr Jeff Ragsdale  
 right 3rd finger tendon rupture repair  HX COLONOSCOPY  4/19/2016, Dr Robbi Nielson  
 Polyp: tubular adenoma, repeat 3 yrs  HX KNEE ARTHROSCOPY Left 12/13/2016  
 related to torn meniscus  HX PARTIAL HYSTERECTOMY  4/11/13 Dr Rocio Martinez  2005  PELVIS/HIP JOINT SURGERY UNLISTED  1969  
 congenital hip dysplasia, left hip reconstruction and bone grafting [facility]  10/09 R Achilles; Dr. Jaron Lomeli  UPPER ARM/ELBOW SURGERY UNLISTED  2005  
 right lateral epicondylitis surgery; TOA MEDICATIONS Current Outpatient Medications Medication Sig  polyethylene glycol (MIRALAX) 17 gram/dose powder MIX AND DRINK 17G BY MOUTH DAILY* NO SUB PERRIGO**  
 cholecalciferol, vitamin D3, (VITAMIN D3) 2,000 unit tab Take  by mouth daily. No current facility-administered medications for this visit. ALLERGIES Allergies Allergen Reactions  Metformin Other (comments) Severe depression and coldness in arms  Bleach (Sodium Hypochlorite) Other (comments)  Prednisone Other (comments) SOB, flushed, red rash SOCIAL HISTORY Social History Socioeconomic History  Marital status:   
 Spouse name: Not on file  Number of children: 2  
 Years of education: Not on file  Highest education level: Not on file Occupational History  Occupation: Speech Pathologist for [employer] Tobacco Use  Smoking status: Never Smoker  Smokeless tobacco: Never Used Substance and Sexual Activity  Alcohol use: Yes Alcohol/week: 0.0 oz  
  Comment: maybe 2-3 drinks of wine/cocktails per year  Drug use: No  
 Sexual activity: Yes  
  Partners: Male Birth control/protection: Surgical  
  Comment: Hysterectomy 2013 Other Topics Concern  Caffeine Concern No  
  Comment: 1 cup of coffee a day maybe 4-5 days a week  Weight Concern Yes Comment: has regained the wt she lost when she was eating healthier and exercising more  Special Diet No  
  Comment: nothing special lately; had met w/ dietician 2016-2017; knows what she is supposed to do but has not been eating healthy, loves sweets  Exercise No  
Social History Narrative Native of [place]; [place] [facility] to [place] ;  ~; Got re- 2012 Recalls getting part of her Hepatitis B series in the , not sure if she got #3 IMMUNIZATIONS Immunization History Administered Date(s) Administered  TD Vaccine 2009 FAMILY HISTORY Family History Problem Relation Age of Onset  Arrhythmia Mother  Bipolar Disorder Mother  Cancer Mother   
     cervical dx in her late 29's  High Cholesterol Mother  Heart Disease Mother   
     heart arrhytmia  Other Mother   
     brain tumor diagnosed at age 67, stable  Diabetes Father   
     obesity  Arthritis-osteo Father ? RA as well  Cancer Father   
     skin cancer  Gout Father  Heart Disease Maternal Grandmother CHF in her 63's  Heart Disease Maternal Grandfather   
      79yo  Hypertension Maternal Grandfather  Diabetes Paternal Grandmother  Stroke Paternal Grandmother   
      in her [de-identified]  Arthritis-rheumatoid Paternal Grandmother  Cancer Paternal Grandfather   
     liver and colon CA,  mid 66's  Diabetes Paternal Grandfather  Depression Sister  Anxiety Sister  Other Sister   
     scoliosis  Other Daughter Tourettes, tremor, tics, milk/soy allergy, lactose intolerance  Other Daughter Tourettes, anxiety  Breast Cancer Paternal Aunt   
     dx in her 29's  Diabetes Paternal Aunt   
     type 2  
 Diabetes Paternal Uncle   
     type 2  Arthritis-rheumatoid Paternal Aunt  Gout Paternal Aunt Marifer Cirri Visit Vitals /82 (BP 1 Location: Left arm, BP Patient Position: Sitting) Pulse 70 Temp 99.2 °F (37.3 °C) (Oral) Resp 18 Ht 5' 9\" (1.753 m) Wt 251 lb 12.8 oz (114.2 kg) LMP 2013 SpO2 98% BMI 37.18 kg/m² PHYSICAL EXAM 
Physical Exam  
Constitutional: No distress. Neck: Neck supple. No thyromegaly present. Cardiovascular: Normal rate, regular rhythm and normal heart sounds. No murmur heard. Pulmonary/Chest: Effort normal and breath sounds normal. No respiratory distress. Abdominal: Soft. She exhibits no distension and no mass. There is no tenderness. Musculoskeletal: She exhibits no edema or tenderness. Vitals reviewed. DIAGNOSTIC DATA Lab Results Component Value Date/Time WBC 7.6 2018 11:49 AM  
 HGB 15.1 2018 11:49 AM  
 HCT 46.2 2018 11:49 AM  
 PLATELET 926  11:49 AM  
 MCV 88 2018 11:49 AM  
 
Lab Results Component Value Date/Time Hemoglobin A1c 6.5 (H) 2018 11:49 AM  
 Hemoglobin A1c 5.8 (H) 2017 10:39 AM  
 Hemoglobin A1c 6.1 (H) 2017 08:49 AM  
 Glucose 101 (H) 2018 11:49 AM  
 Microalb/Creat ratio (ug/mg creat.) 2.7 2018 11:49 AM  
 LDL, calculated 129 (H) 2018 11:49 AM  
 Creatinine 1.01 (H) 2018 11:49 AM  
  
Lab Results Component Value Date/Time Cholesterol, total 225 (H) 12/03/2018 11:49 AM  
 HDL Cholesterol 65 12/03/2018 11:49 AM  
 LDL, calculated 129 (H) 12/03/2018 11:49 AM  
 Triglyceride 154 (H) 12/03/2018 11:49 AM  
 CHOL/HDL Ratio 2.9 10/09/2009 08:33 AM  
 
Lab Results Component Value Date/Time TSH 1.970 12/03/2018 11:49 AM  
  
Lab Results Component Value Date/Time Sodium 142 12/03/2018 11:49 AM  
 Potassium 4.8 12/03/2018 11:49 AM  
 Chloride 102 12/03/2018 11:49 AM  
 CO2 22 12/03/2018 11:49 AM  
 Anion gap 7 10/09/2009 08:33 AM  
 Glucose 101 (H) 12/03/2018 11:49 AM  
 BUN 13 12/03/2018 11:49 AM  
 Creatinine 1.01 (H) 12/03/2018 11:49 AM  
 BUN/Creatinine ratio 13 12/03/2018 11:49 AM  
 GFR est AA 73 12/03/2018 11:49 AM  
 GFR est non-AA 63 12/03/2018 11:49 AM  
 Calcium 10.0 12/03/2018 11:49 AM  
 Bilirubin, total 1.3 (H) 12/03/2018 11:49 AM  
 ALT (SGPT) 44 (H) 12/03/2018 11:49 AM  
 AST (SGOT) 31 12/03/2018 11:49 AM  
 Alk. phosphatase 82 12/03/2018 11:49 AM  
 Protein, total 7.2 12/03/2018 11:49 AM  
 Albumin 4.4 12/03/2018 11:49 AM  
 Globulin 3.5 03/20/2009 10:23 AM  
 A-G Ratio 1.6 12/03/2018 11:49 AM  
  
 
ASSESSMENT & PLAN 
  ICD-10-CM ICD-9-CM 1. Diabetes mellitus type 2, noninsulin dependent (HCC) E11.9 250.00 HM DIABETES EYE EXAM  
2. Mixed hyperlipidemia E78.2 272.2 Fasting labs checked today (these were already preordered from ) Cardiovascular risk and specific lipid/LDL/BS goals reviewed Specific diabetic recommendations: diabetic diet discussed in detail, written exchange diet given and annual eye examinations at Ophthalmology discussed Patient states she is just unmotivated to comply w/ diet or exercise, knows what to do just prefers to be happy w/ eating what she likes and not motivated to exercise until weather gets nicer Reviewed diet, nutrition, exercise, weight management, BMI/goals, age/risk based screening recommendations, health maintenance & prevention counseling. Cancer screening USPTFS guidelines reviewed w/ pt today. Discussed benefits/positive/negative outcomes of screening based on age/risk stratification. Informed consent for/against screening based on pt's personal hx/risk factors. Updated in history above and health maintenance. Further follow up & other recommendations pending review of labs.  If all remains good and stable, follow up in 6 months, sooner prn

## 2019-03-05 LAB
ALBUMIN SERPL-MCNC: 4.6 G/DL (ref 3.5–5.5)
ALP SERPL-CCNC: 85 IU/L (ref 39–117)
ALT SERPL-CCNC: 32 IU/L (ref 0–32)
AST SERPL-CCNC: 23 IU/L (ref 0–40)
BILIRUB DIRECT SERPL-MCNC: 0.27 MG/DL (ref 0–0.4)
BILIRUB SERPL-MCNC: 1.5 MG/DL (ref 0–1.2)
BUN SERPL-MCNC: 16 MG/DL (ref 6–24)
BUN/CREAT SERPL: 16 (ref 9–23)
CALCIUM SERPL-MCNC: 10 MG/DL (ref 8.7–10.2)
CHLORIDE SERPL-SCNC: 99 MMOL/L (ref 96–106)
CHOLEST SERPL-MCNC: 217 MG/DL (ref 100–199)
CO2 SERPL-SCNC: 27 MMOL/L (ref 20–29)
CREAT SERPL-MCNC: 0.99 MG/DL (ref 0.57–1)
EST. AVERAGE GLUCOSE BLD GHB EST-MCNC: 137 MG/DL
GLUCOSE SERPL-MCNC: 122 MG/DL (ref 65–99)
HBA1C MFR BLD: 6.4 % (ref 4.8–5.6)
HBV SURFACE AB SER-ACNC: 227.7 MIU/ML
HBV SURFACE AG SERPL QL IA: NEGATIVE
HCV AB S/CO SERPL IA: <0.1 S/CO RATIO (ref 0–0.9)
HDLC SERPL-MCNC: 63 MG/DL
INTERPRETATION, 910389: NORMAL
LDLC SERPL CALC-MCNC: 121 MG/DL (ref 0–99)
POTASSIUM SERPL-SCNC: 4.8 MMOL/L (ref 3.5–5.2)
PROT SERPL-MCNC: 7.2 G/DL (ref 6–8.5)
SODIUM SERPL-SCNC: 139 MMOL/L (ref 134–144)
TRIGL SERPL-MCNC: 167 MG/DL (ref 0–149)
VLDLC SERPL CALC-MCNC: 33 MG/DL (ref 5–40)

## 2019-03-16 ENCOUNTER — TELEPHONE (OUTPATIENT)
Dept: FAMILY MEDICINE CLINIC | Age: 54
End: 2019-03-16

## 2019-03-16 NOTE — TELEPHONE ENCOUNTER
Kidney and electrolytes normal.  ALT liver enzyme improved and back to normal  AST remains good and normal  Bilirubin still mildly elevated which can be a hereditary form, consuelo since everything else is normal. Nothing additional needed for that at this time. Be sure to stay well hydrated as well. Hepatitis screening was negative as well.    TG still elevated at 167 now, goal <150  , goal <100  HDL remains good at 63 w/ goal >50  , HgBA1c slightly improved from 6.5 to 6.4  We already discussed diet/exercise at her recent appt  So we can see how she does and recheck in 6months

## 2019-11-20 ENCOUNTER — TELEPHONE (OUTPATIENT)
Dept: FAMILY MEDICINE CLINIC | Age: 54
End: 2019-11-20

## 2019-11-20 DIAGNOSIS — Z00.00 ROUTINE GENERAL MEDICAL EXAMINATION AT A HEALTH CARE FACILITY: Primary | ICD-10-CM

## 2019-11-20 DIAGNOSIS — E78.2 MIXED HYPERLIPIDEMIA: ICD-10-CM

## 2019-11-20 DIAGNOSIS — E11.9 DIABETES MELLITUS TYPE 2, NONINSULIN DEPENDENT (HCC): ICD-10-CM

## 2019-11-20 NOTE — TELEPHONE ENCOUNTER
----- Message from Ever Gore sent at 2019 12:28 PM EST -----  Regarding: Dr. Ramiro Ledesma first and last name:      Reason for call:  Pt has appt on 19 and wants to know if she can come in on a earlier day for lab work since she has to fast.      Callback required yes/no and why: yes      Best contact number(s):  [phone]      Details to clarify the request:      Ever Gore       . Outbound call to , Pt  verified, she states she wants to do the labs a week before her appt.  requesting orders put in before that

## 2019-12-04 ENCOUNTER — OFFICE VISIT (OUTPATIENT)
Dept: FAMILY MEDICINE CLINIC | Age: 54
End: 2019-12-04

## 2019-12-04 VITALS
RESPIRATION RATE: 16 BRPM | BODY MASS INDEX: 31.84 KG/M2 | HEIGHT: 69 IN | OXYGEN SATURATION: 98 % | SYSTOLIC BLOOD PRESSURE: 118 MMHG | TEMPERATURE: 98.2 F | WEIGHT: 215 LBS | HEART RATE: 55 BPM | DIASTOLIC BLOOD PRESSURE: 72 MMHG

## 2019-12-04 DIAGNOSIS — E78.2 MIXED HYPERLIPIDEMIA: ICD-10-CM

## 2019-12-04 DIAGNOSIS — Z00.00 ROUTINE GENERAL MEDICAL EXAMINATION AT A HEALTH CARE FACILITY: Primary | ICD-10-CM

## 2019-12-04 DIAGNOSIS — E11.9 DIABETES MELLITUS TYPE 2, NONINSULIN DEPENDENT (HCC): ICD-10-CM

## 2019-12-04 PROBLEM — M54.41 RIGHT-SIDED LOW BACK PAIN WITH RIGHT-SIDED SCIATICA: Status: ACTIVE | Noted: 2019-12-04

## 2019-12-04 RX ORDER — MELOXICAM 15 MG/1
15 TABLET ORAL
COMMUNITY
Start: 2019-09-23 | End: 2019-12-04

## 2019-12-04 RX ORDER — MELOXICAM 15 MG/1
TABLET ORAL
Refills: 1 | COMMUNITY
Start: 2019-09-23 | End: 2019-12-04 | Stop reason: SDUPTHER

## 2019-12-04 RX ORDER — DICLOFENAC SODIUM 75 MG/1
75 TABLET, DELAYED RELEASE ORAL
COMMUNITY
Start: 2019-07-15 | End: 2019-12-04

## 2019-12-04 RX ORDER — POLYETHYLENE GLYCOL 3350 17 G/17G
17 POWDER, FOR SOLUTION ORAL
COMMUNITY
End: 2019-12-04 | Stop reason: SDUPTHER

## 2019-12-04 NOTE — PROGRESS NOTES
Chief Complaint   Patient presents with    Complete Physical     fasting labs done 11-30-19    Diabetes     follow up    Cholesterol Problem     follow up     [facility]  Annual CPE, Follow up Type 2 NIDDM, Hyperlipidemia. Had fasting labs done 11-30-19, here to review. 4-2019 started 1500 calorie restricted diet, low carbs up to 50g/day, high protein  Has been exercising more regularly again since the spring. Wt is down from 251 lbs in 3-2019 to 215 now. Checks BS's qam.  Fasting runs 110-140.  2 hours after lunch or dinner runs 110's  She got 2 ARI in the past 2 months per Dr. Darby Salinas for Right Sciatica and completed several months of PT. Back and leg are getting much better. She was taking Mobic and then Diclofenac on a daily basis up until about 4 weeks ago, now taking nothing. Overall feeling much better and motivated to keep her wt down. REVIEW OF SYMPTOMS   Review of Systems   Constitutional: Negative. HENT: Negative. Eyes: Negative. Respiratory: Negative. Cardiovascular: Negative. Gastrointestinal: Negative. Genitourinary: Negative. Skin: Negative. Neurological: Negative for dizziness, tingling, focal weakness and headaches. Endo/Heme/Allergies: Negative. Psychiatric/Behavioral: Negative.             PROBLEM LIST/MEDICAL HISTORY     Problem List  Date Reviewed: 12/4/2019          Codes Class Noted    Right-sided low back pain with right-sided sciatica ICD-10-CM: M54.41  ICD-9-CM: 724.3  12/4/2019    Overview Signed 12/4/2019  3:50 PM by Scott Shah MD     [facility] Dr. Cristiane Flores, PT summer 2019; Pain Mgt, Dr. Darby Salinas Injections 10/2019 & 11/2019; MRI             Mixed hyperlipidemia ICD-10-CM: D21.1  ICD-9-CM: 272.2  3/14/2018        Diabetes mellitus type 2, [facility]insulin [facility] ICD-10-CM: E11.9  ICD-9-CM: 250.00  12/27/2016    Overview Signed 12/27/2016  2:21 PM by Scott Shah MD     [id number]             Tear of medial meniscus of left knee ICD-10-CM: X52.631V  ICD-9-CM: 836.0  2016    Overview Signed 2016 10:42 AM by Tee Wilhelm MD     ~ Spring 2016, Ortho Dr Day Sandoval, scope scheduled 16             Borderline hypertension ICD-10-CM: R03.0  ICD-9-CM: 796.2  2016        Screening exams for skin cancer ICD-10-CM: Z12.83  ICD-9-CM: V76.43  2016    Overview Signed 2016  2:38 PM by Tee Wilhelm MD     Derm Dr Quintana             Right carpal tunnel syndrome ICD-10-CM: G56.01  ICD-9-CM: 354.0  2015    Overview Addendum 2015 11:04 AM by Tee Wilhelm MD     ~ 2013 Dr Lala Leon: EMG study + Impression:  The above electrodiagnostic study reveals evidence of mildly prolonged motor and sensory distal latency of right median nerve, suggesting mild median nerve entrapment at wrist.             Vertigo ICD-10-CM: R42  ICD-9-CM: 780.4  6/3/2013        Vitamin D deficiency ICD-10-CM: E55.9  ICD-9-CM: 268.9  2012    Overview Signed 2012  1:42 PM by Tee Wilhelm MD     2012             History of low back pain, DDD, mild scoliosis and mild-moderate disc protrusions ICD-10-CM: Z87.39  ICD-9-CM: V13.59  2012        Mild-moderate lumbar disc protrusions L4-5, L5-S1, mild scoliosis ICD-10-CM: M51.26  ICD-9-CM: 722.10  2012    Overview Signed 2012  3:01 PM by Tee Wilhelm MD     2007             Abnormal Pap smear s/p Cryotherapy ~  ICD-10-CM: [id number]  ICD-9-CM: [id number]  2010    Overview Signed 2010  8:53 AM by Tee Wilhelm MD     GYN Dr Libertad Edward (normal spontaneous vaginal delivery) ICD-10-CM: O80  ICD-9-CM: 148  Unknown    Overview Signed 3/22/2010 10:24 AM by Tee Wilhelm MD     A2,  x2, SAB x2             Achilles tendonitis ICD-10-CM: M76.60  ICD-9-CM: 726.71  3/1/2010    Overview Signed 3/1/2010 11:14 AM by Sukumar GERARDO>R s/p cortisone Anxiety ICD-10-CM: F41.9  ICD-9-CM: 300.00  3/1/2010    Overview Signed 3/1/2010 11:14 AM by Mercedes More     mild             H/O Abnormal Pap Smears 1980, 1998 ICD-9-CM: 796.9  3/1/2010    Overview Signed 3/1/2010 11:17 AM by Mercedes More     [age]'Y and 1998 (no Bx)             Congenital hip dysplasia ICD-10-CM: Q65.89  ICD-9-CM: 755.63  3/1/2010    Overview Signed 3/1/2010 11:18 AM by Mercedes More     Left.  Recontruction, bone grafting 1969             Scoliosis ICD-10-CM: M41.9  ICD-9-CM: 737.30  8/1/2007    Overview Signed 3/1/2010 11:15 AM by Mercedes More     mild             Hip pain ICD-10-CM: M25.559  ICD-9-CM: 719.45  8/1/2007    Overview Signed 3/1/2010 11:15 AM by Mercedes More     right             S/P Surgery for Right Lateral Epicondylitis, 2005 ICD-10-CM: [id number]  ICD-9-CM: 726.32  1/1/2005    Overview Addendum 5/8/2012  3:03 PM by Melody Delacruz MD     TOA             S/P LASIK surgery ICD-10-CM: V55.703  ICD-9-CM: V45.69  1/1/2005        GERD (gastroesophageal reflux disease) ICD-10-CM: K21.9  ICD-9-CM: 530.81  1/1/2003    Overview Addendum 5/8/2012  3:01 PM by Melody Delacruz MD     (I) 2003                       PAST SURGICAL HISTORY     Past Surgical History:   Procedure Laterality Date    CRYOCAUTERY OF CERVIX  ~11/2008    Dr Belle Doherty    right 3rd finger tendon rupture repair    HX COLONOSCOPY  04/19/2016    Polyp: tubular adenoma, repeat 3 yrs, Dr Twin Lam HX COLONOSCOPY  07/2019    2 polyps, Dr. Walter Thomas ARTHROSCOPY Left 12/13/2016    related to torn meniscus    HX PARTIAL HYSTERECTOMY  04/11/2013    Cervix intact; Dr King Sultana    congenital hip dysplasia, left hip reconstruction and bone grafting    REPAIR ACHILLES TENDON,PRIMARY  10/Lexi Jang; Dr. La Caban UNLISTED  2005    right lateral epicondylitis surgery; TOA         MEDICATIONS     Current Outpatient Medications   Medication Sig    polyethylene glycol (MIRALAX) 17 gram/dose powder MIX AND DRINK 17G BY MOUTH EVERY DAY*NO SUB PERRIGO*    cholecalciferol, vitamin D3, (VITAMIN D3) 2,000 unit tab Take  by mouth daily. No current facility-administered medications for this visit. ALLERGIES     Allergies   Allergen Reactions    Prednisone Shortness of Breath     SOB, flushed, red rash    Bleach (Sodium Hypochlorite) Other (comments)    Metformin Other (comments)     Severe depression and coldness in arms            SOCIAL HISTORY     Social History     Socioeconomic History    Marital status:      Spouse name: Not on file    Number of children: 2    Years of education: Not on file    Highest education level: Not on file   Occupational History    Occupation: Speech Pathologist for [employer]   Tobacco Use    Smoking status: Never Smoker    Smokeless tobacco: Never Used   Substance and Sexual Activity    Alcohol use:  Yes     Alcohol/week: 0.0 standard drinks     Comment: maybe 2-3 drinks of wine/cocktails per year    Drug use: No    Sexual activity: Yes     Partners: Male     Birth control/protection: Surgical     Comment: Hysterectomy 2013   Other Topics Concern    Caffeine Concern No     Comment: 1 cup of coffee a day maybe 4-5 days a week    Weight Concern Yes     Comment: since eating healthier 4-2019, wt is down ~35-40 lbs    Special Diet Yes     Comment: since 4-2019: 1500 calorie diet, low carb (50 g/day), high protein    Back Care Yes     Comment: Ortho Dr. Merrill Hernandes, Pain Mgt Dr Vela Poag Injections summer/fall 2019, PT summer 2019    Exercise Yes     Comment: ellipitical 2 x a week x 1 hr, walks on weekends x 1 hr each time   Social History Narrative    Native of [place]; moved [place] [facility] [place];  ~2006; Got re- 4/2012    Recalls getting part of her Hepatitis B series in the , not sure if she got #3        IMMUNIZATIONS  Immunization History   Administered Date(s) Administered    TD Vaccine 2009         FAMILY HISTORY     Family History   Problem Relation Age of Onset    Arrhythmia Mother     Bipolar Disorder Mother     Cancer Mother         cervical dx in her late 29's   [facility] High Cholesterol Mother     Heart Disease Mother         heart arrhytmia    Other Mother         brain tumor diagnosed at age 67, stable    Diabetes Father         obesity    Arthritis-osteo Father         ? RA as well    Cancer Father         skin cancer    Gout Father     Heart Disease Maternal Grandmother         CHF in her 63's    Heart Disease Maternal Grandfather          79yo    Hypertension Maternal Grandfather     Diabetes Paternal Grandmother     Stroke Paternal Grandmother          in her [de-identified]    Arthritis-rheumatoid Paternal Grandmother     Cancer Paternal Grandfather         liver and colon CA,  mid 66's    Diabetes Paternal Grandfather     Depression Sister     Anxiety Sister     Other Sister         scoliosis    Other Daughter         Tourettes, tremor, tics, milk/soy allergy, lactose intolerance    Other Daughter         Tourettes, anxiety    Breast Cancer Paternal Aunt         dx in her 29's    Diabetes Paternal Aunt         type 2    Diabetes Paternal Uncle         type 2    Arthritis-rheumatoid Paternal Aunt     Gout Paternal Aunt          VITALS     Visit Vitals  /72 (BP 1 Location: Left arm, BP Patient Position: Sitting)   Pulse (!) 55   Temp 98.2 °F (36.8 °C) (Oral)   Resp 16   Ht 5' 9\" (1.753 m)   Wt 215 lb (97.5 kg)   LMP 2013   SpO2 98%   BMI 31.75 kg/m²          PHYSICAL EXAMINATION   Physical Exam  Vitals signs reviewed. Constitutional:       General: She is not in acute distress. Appearance: Normal appearance.    HENT:      Right Ear: Tympanic membrane normal.      Left Ear: Tympanic membrane normal.      Nose: Nose normal.      Mouth/Throat:      Mouth: Mucous membranes are moist.      Pharynx: Oropharynx is clear. Eyes:      General: No scleral icterus. Pupils: Pupils are equal, round, and reactive to light. Neck:      Musculoskeletal: No muscular tenderness. Thyroid: No thyroid mass, thyromegaly or thyroid tenderness. Vascular: No carotid bruit. Cardiovascular:      Rate and Rhythm: Normal rate and regular rhythm. Pulses: Normal pulses. Heart sounds: Normal heart sounds. No murmur. No gallop. Pulmonary:      Effort: Pulmonary effort is normal.      Breath sounds: Normal breath sounds. Abdominal:      Palpations: Abdomen is soft. There is no mass. Tenderness: There is no tenderness. Musculoskeletal:         General: No tenderness. Right lower leg: No edema. Left lower leg: No edema. Lymphadenopathy:      Cervical: No cervical adenopathy. Skin:     General: Skin is warm and dry. Neurological:      General: No focal deficit present. Sensory: No sensory deficit. Coordination: Coordination normal.      Comments: Diabetic foot exam: Normal monofilament testing B. Skin warm, dry, intact. DP/PT pulses strong and intact. No skin breakdown, wounds, ulcers, lesions.      Psychiatric:         Mood and Affect: Mood normal.             DIAGNOSTIC DATA         LABORATORY DATA     Results for orders placed or performed in visit on 11/30/19   MICROALBUMIN, UR, RAND W/ MICROALB/CREAT RATIO   Result Value Ref Range    Creatinine, urine 128.9 Not Estab. mg/dL    Microalbumin, urine <3.0 Not Estab. ug/mL    Microalb/Creat ratio (ug/mg creat.) <2.3 0.0 - 30.0 mg/g creat   TSH 3RD GENERATION   Result Value Ref Range    TSH 2.050 0.450 - 4.500 uIU/mL   HEMOGLOBIN A1C WITH EAG   Result Value Ref Range    Hemoglobin A1c 6.1 (H) 4.8 - 5.6 %    Estimated average glucose 697 mg/dL   METABOLIC PANEL, COMPREHENSIVE   Result Value Ref Range    Glucose 106 (H) 65 - 99 mg/dL    BUN 12 6 - 24 mg/dL    Creatinine 1.08 (H) 0.57 - 1.00 mg/dL    GFR est non-AA 58 (L) >59 mL/min/1.73    GFR est AA 67 >59 mL/min/1.73    BUN/Creatinine ratio 11 9 - 23    Sodium 140 134 - 144 mmol/L    Potassium 4.3 3.5 - 5.2 mmol/L    Chloride 101 96 - 106 mmol/L    CO2 24 20 - 29 mmol/L    Calcium 9.7 8.7 - 10.2 mg/dL    Protein, total 6.0 6.0 - 8.5 g/dL    Albumin 4.1 3.5 - 5.5 g/dL    GLOBULIN, TOTAL 1.9 1.5 - 4.5 g/dL    A-G Ratio 2.2 1.2 - 2.2    Bilirubin, total 1.1 0.0 - 1.2 mg/dL    Alk. phosphatase 73 39 - 117 IU/L    AST (SGOT) 15 0 - 40 IU/L    ALT (SGPT) 14 0 - 32 IU/L   LIPID PANEL   Result Value Ref Range    Cholesterol, total 196 100 - 199 mg/dL    Triglyceride 101 0 - 149 mg/dL    HDL Cholesterol 69 >39 mg/dL    VLDL, calculated 20 5 - 40 mg/dL    LDL, calculated 107 (H) 0 - 99 mg/dL   CBC W/O DIFF   Result Value Ref Range    WBC 6.5 3.4 - 10.8 x10E3/uL    RBC 5.20 3.77 - 5.28 x10E6/uL    HGB 15.0 11.1 - 15.9 g/dL    HCT 45.8 34.0 - 46.6 %    MCV 88 79 - 97 fL    MCH 28.8 26.6 - 33.0 pg    MCHC 32.8 31.5 - 35.7 g/dL    RDW 13.3 12.3 - 15.4 %    PLATELET 495 542 - 089 x10E3/uL   CVD REPORT   Result Value Ref Range    INTERPRETATION Note     PDF IMAGE Not applicable    CKD REPORT   Result Value Ref Range    Interpretation Note        Lab Results   Component Value Date/Time    Hemoglobin A1c 6.1 (H) 11/30/2019 10:58 AM    Hemoglobin A1c 6.4 (H) 03/04/2019 08:41 AM    Hemoglobin A1c 6.5 (H) 12/03/2018 11:49 AM    Glucose 106 (H) 11/30/2019 10:58 AM    Microalb/Creat ratio (ug/mg creat.) <2.3 11/30/2019 10:58 AM    LDL, calculated 107 (H) 11/30/2019 10:58 AM    Creatinine 1.08 (H) 11/30/2019 10:58 AM          ASSESSMENT & PLAN       ICD-10-CM ICD-9-CM    1. Routine general medical examination at a health care facility Z00.00 V70.0    2. Diabetes mellitus type 2, noninsulin dependent (HCC) E11.9 250.00    3.  Mixed hyperlipidemia E78.2 272.2      Fasting labs from 11-30-19, copied above, reviewed in detail w/ pt today. Cardiovascular risk and specific lipid/LDL/BS/HgbA1c/BP goals reviewed  She has been back on a low calorie, low carb diet since 4-2019 and back to exercising more regularly and has lost ~ 35 lbs since 4-2019. Her lipid panel has also improved nicely and are the best readings she has had in several years. HgbA1c has come down nicely from 6.5 to 6.1. Her cr is only marginally elevated. She is off nsaids now. She got 2 ARI in the past 2 months per Dr. Kanwal Awan for Right Sciatica and completed several months of PT. She was taking Mobic and then Diclofenac on a daily basis up until about 4 weeks ago, now taking nothing because she is coming along so much better. Reviewed diet, nutrition, exercise, weight management, BMI/goals, age/risk based screening recommendations, health maintenance & prevention counseling. Cancer screening USPTFS guidelines reviewed w/ pt today. Discussed benefits/positive/negative outcomes of screening based on age/risk stratification. Informed consent for/against screening based on pt's personal hx/risk factors. Updated in history above and health maintenance. Reminded to schedule diabetes eye exam.   Normal foot exam in office today. Sees gyn annually for well woman visits/gyn care. Pap reportedly ok this summer. [facility]amm[facility]. On a q 6month schedule due to breast calcifications. Mammogram 5/2019 and had 6 month follow up yesterday. Colonoscopy up to date.   RTC 6 month diabetes check, 1 yr for CPE

## 2019-12-04 NOTE — PATIENT INSTRUCTIONS
Well Visit, Women 48 to 72: Care Instructions Your Care Instructions Physical exams can help you stay healthy. Your doctor has checked your overall health and may have suggested ways to take good care of yourself. He or she also may have recommended tests. At home, you can help prevent illness with healthy eating, regular exercise, and other steps. Follow-up care is a key part of your treatment and safety. Be sure to make and go to all appointments, and call your doctor if you are having problems. It's also a good idea to know your test results and keep a list of the medicines you take. How can you care for yourself at home? · Reach and stay at a healthy weight. This will lower your risk for many problems, such as obesity, diabetes, heart disease, and high blood pressure. · Get at least 30 minutes of exercise on most days of the week. Walking is a good choice. You also may want to do other activities, such as running, swimming, cycling, or playing tennis or team sports. · Do not smoke. Smoking can make health problems worse. If you need help quitting, talk to your doctor about stop-smoking programs and medicines. These can increase your chances of quitting for good. · Protect your skin from too much sun. When you're outdoors from 10 a.m. to 4 p.m., stay in the shade or cover up with clothing and a hat with a wide brim. Wear sunglasses that block UV rays. Even when it's cloudy, put broad-spectrum sunscreen (SPF 30 or higher) on any exposed skin. · See a dentist one or two times a year for checkups and to have your teeth cleaned. · Wear a seat belt in the car. Follow your doctor's advice about when to have certain tests. These tests can spot problems early. · Cholesterol. Your doctor will tell you how often to have this done based on your age, family history, or other things that can increase your risk for heart attack and stroke. · Blood pressure. Have your blood pressure checked during a routine doctor visit. Your doctor will tell you how often to check your blood pressure based on your age, your blood pressure results, and other factors. · Mammogram. Ask your doctor how often you should have a mammogram, which is an X-ray of your breasts. A mammogram can spot breast cancer before it can be felt and when it is easiest to treat. · Pap test and pelvic exam. Ask your doctor how often you should have a Pap test. You may not need to have a Pap test as often as you used to. · Vision. Have your eyes checked every year or two or as often as your doctor suggests. Some experts recommend that you have yearly exams for glaucoma and other age-related eye problems starting at age 48. · Hearing. Tell your doctor if you notice any change in your hearing. You can have tests to find out how well you hear. · Diabetes. Ask your doctor whether you should have tests for diabetes. · Colorectal cancer. Your risk for colorectal cancer gets higher as you get older. Some experts say that adults should start regular screening at age 48 and stop at age 76. Others say to start before age 48 or continue after age 76. Talk with your doctor about your risk and when to start and stop screening. · Thyroid disease. Talk to your doctor about whether to have your thyroid checked as part of a regular physical exam. Women have an increased chance of a thyroid problem. · Osteoporosis. You should begin tests for bone density at age 72. If you are younger than 72, ask your doctor whether you have factors that may increase your risk for this disease. You may want to have this test before age 72. · Heart attack and stroke risk. At least every 4 to 6 years, you should have your risk for heart attack and stroke assessed.  Your doctor uses factors such as your age, blood pressure, cholesterol, and whether you smoke or have diabetes to show what your risk for a heart attack or stroke is over the next 10 years. When should you call for help? Watch closely for changes in your health, and be sure to contact your doctor if you have any problems or symptoms that concern you. Where can you learn more? Go to http://demi-kellie.info/. Enter R481 in the search box to learn more about \"Well Visit, Women 50 to 72: Care Instructions. \" Current as of: December 13, 2018 Content Version: 12.2 © 1733-3248 [employer]. Care instructions adapted under license by [employer] (which disclaims liability or warranty for this information). If you have questions about a medical condition or this instruction, always ask your healthcare professional. [address] any warranty or liability for your use of this information.

## 2019-12-04 NOTE — PROGRESS NOTES
Chief Complaint   Patient presents with    Physical     blood completed     Back Pain     right side     Other     has had 2 injections in back for sciaticia , is seeing a Spine and Pain Management doctor ( Dr. Benson Lu )      1. Have you been to the ER, urgent care clinic since your last visit? Hospitalized since your last visit? No    2. Have you seen or consulted any other health care providers outside of the [address] [place] [address] since your last visit? Yes, Spine and Pain management. Include any pap smears or colon screening.    Yes, Colonoscopy

## 2020-08-25 ENCOUNTER — OFFICE VISIT (OUTPATIENT)
Dept: URGENT CARE | Age: 55
End: 2020-08-25
Payer: COMMERCIAL

## 2020-08-25 VITALS — HEART RATE: 60 BPM | OXYGEN SATURATION: 97 % | TEMPERATURE: 98.8 F | RESPIRATION RATE: 16 BRPM

## 2020-08-25 DIAGNOSIS — J02.9 SORE THROAT: ICD-10-CM

## 2020-08-25 DIAGNOSIS — R05.9 COUGH: Primary | ICD-10-CM

## 2020-08-25 DIAGNOSIS — R11.0 NAUSEA: ICD-10-CM

## 2020-08-25 DIAGNOSIS — Z20.822 EXPOSURE TO COVID-19 VIRUS: ICD-10-CM

## 2020-08-25 LAB
S PYO AG THROAT QL: NEGATIVE
VALID INTERNAL CONTROL?: YES

## 2020-08-25 PROCEDURE — 87880 STREP A ASSAY W/OPTIC: CPT | Performed by: FAMILY MEDICINE

## 2020-08-25 PROCEDURE — 99203 OFFICE O/P NEW LOW 30 MIN: CPT | Performed by: FAMILY MEDICINE

## 2020-08-25 NOTE — PROGRESS NOTES
This patient was seen in [facility] at [address] [place] [address] Urgent Care while in their vehicle due to COVID-19 pandemic with PPE and focused examination in order to decrease community viral transmission. The patient/guardian gave verbal consent to treat. Sammie Gaona is a 63 y.o. female who presents with cough, ST, runny nose and HA since yesterday. Was exposed to COVID-19 a few days ago. Denies fever, SOB. PMH: GERD. Non-smoker. The history is provided by the patient.         Past Medical History:   Diagnosis Date    Abnormal Pap smear s/p Cryotherapy ~ 2010    Achilles tendonitis     Bilateral; R>L, chronic, refractory    Anxiety     Mild; treated briefly    Borderline hypertension 2016    Congenital hip dysplasia     Reconstruction w/ bone grafting    Diabetes mellitus type 2, noninsulin dependent ([facility] 2016    GERD (gastroesophageal reflux disease)     UGI    Hip pain 2007    chronic R Hip pain    History of low back pain, DDD, mild scoliosis and mild-moderate disc protrusions 2012    Meniscus tear 2016    right knee    Mild-moderate lumbar disc protrusions L4-5, L5-S1, mild scoliosis 2012    Mixed hyperlipidemia 3/14/2018    Mononucleosis 2009     (normal spontaneous vaginal delivery)     A2,  x2, SAB x2    Pap smear abnormality ,     Cryotx     Right carpal tunnel syndrome 2015    ~ 2013: EMG study +    Right-sided low back pain with right-sided sciatica 2019    [facility]tho [facility] Dr. Jagruti Paulino, PT summer 2019; Pain Mgt, Dr. Ileana Donato Injections 10/2019 & 2019; MRI    Scoliosis 2007    Screening exams for skin cancer 2016    Derm Dr Rob Smith Strep throat 2009    Tendon rupture 1997    R 3rd digit tendon rupture and repair    Vitamin D deficiency 2012        Past Surgical History:   Procedure Laterality Date    CRYOCAUTERY OF CERVIX  ~2008    Dr Rob Smith HAND/FINGER SURGERY UNLISTED      right 3rd finger tendon rupture repair    HX COLONOSCOPY  2016    Polyp: tubular adenoma, repeat 3 yrs, Dr Linda Parisi HX COLONOSCOPY  2019    2 polyps, Dr. Malia West    HX KNEE ARTHROSCOPY Left 2016    related to torn meniscus    HX PARTIAL HYSTERECTOMY  2013    Cervix intact; Dr Sen Quezada    congenital hip dysplasia, left hip reconstruction and bone grafting    DE REPAIR ACHILLES TENDON,PRIMARY  10/09    R Achilles; Dr. Vera Agent      right lateral epicondylitis surgery; TOA         Family History   Problem Relation Age of Onset    Arrhythmia Mother     Bipolar Disorder Mother     Cancer Mother         cervical dx in her late 29's   [address] [place] [address] High Cholesterol Mother     Heart Disease Mother         heart arrhytmia    Other Mother         brain tumor diagnosed at age 67, stable    Diabetes Father         obesity    Arthritis-osteo Father         ? RA as well    Cancer Father         skin cancer    Gout Father     Heart Disease Maternal Grandmother         CHF in her 63's    Heart Disease Maternal Grandfather          79yo    Hypertension Maternal Grandfather     Diabetes Paternal Grandmother     Stroke Paternal Grandmother          in her [de-identified]    Arthritis-rheumatoid Paternal Grandmother     Cancer Paternal Grandfather         liver and colon CA,  mid 66's    Diabetes Paternal Grandfather     Depression Sister     Anxiety Sister     Other Sister         scoliosis    Other Daughter         Tourettes, tremor, tics, milk/soy allergy, lactose intolerance    Other Daughter         Tourettes, anxiety    Breast Cancer Paternal Aunt         dx in her 29's    Diabetes Paternal Aunt         type 2    Diabetes Paternal Uncle         type 2    Arthritis-rheumatoid Paternal Aunt     Gout Paternal Aunt         Social History Socioeconomic History    Marital status:      Spouse name: Not on file    Number of children: 2    Years of education: Not on file    Highest education level: Not on file   Occupational History    Occupation: Speech Pathologist for [employer] Financial resource strain: Not on file    Food insecurity     Worry: Not on file     Inability: Not on file   [employer] needs     Medical: Not on file     Non-medical: Not on file   Tobacco Use    Smoking status: Never Smoker    Smokeless tobacco: Never Used   Substance and Sexual Activity    Alcohol use:  Yes     Alcohol/week: 0.0 standard drinks     Comment: maybe 2-3 drinks of wine/cocktails per year    Drug use: No    Sexual activity: Yes     Partners: Male     Birth control/protection: Surgical     Comment: Hysterectomy 2013   Lifestyle    Physical activity     Days per week: Not on file     Minutes per session: Not on file    Stress: Not on file   Relationships    Social connections     Talks on phone: Not on file     Gets together: Not on file     Attends Taoist service: Not on file     Active member of club or organization: Not on file     Attends meetings of clubs or organizations: Not on file     Relationship status: Not on file    Intimate partner violence     Fear of current or ex partner: Not on file     Emotionally abused: Not on file     Physically abused: Not on file     Forced sexual activity: Not on file   Other Topics Concern     Service Not Asked    Blood Transfusions Not Asked    Caffeine Concern No     Comment: 1 cup of coffee a day maybe 4-5 days a week    Occupational Exposure Not Asked    Hobby Hazards Not Asked    Sleep Concern Not Asked    Stress Concern Not Asked    Weight Concern Yes     Comment: since eating healthier 4-2019, wt is down ~35-40 lbs    Special Diet Yes     Comment: since 4-2019: 1500 calorie diet, low carb (50 g/day), high protein    Back Care Yes Comment: Ortho Dr. Macdonald Cool, Pain Mgt Dr Chatman Cease Injections summer/fall 2019, PT summer 2019    Exercise Yes     Comment: ellipitical 2 x a week x 1 hr, walks on weekends x 1 hr each time    Bike Helmet Not Asked   [address] Not Asked    Self-Exams Not Asked   Social History Narrative    Native of [place]; [place] [facility] [place];  ~2006; Got re- 4/2012    Recalls getting part of her Hepatitis B series in the 1990's, not sure if she got #3                ALLERGIES: Prednisone; Bleach (sodium hypochlorite); and Metformin    Review of Systems   Constitutional: Negative for activity change, appetite change, chills and fever. HENT: Positive for rhinorrhea and sore throat. Negative for congestion. Respiratory: Positive for cough. Negative for wheezing. Cardiovascular: Negative for chest pain. Gastrointestinal: Negative for abdominal pain, diarrhea, nausea and vomiting. Musculoskeletal: Negative for myalgias. Neurological: Negative for headaches. Vitals:    08/25/20 1150 08/25/20 1214   Pulse: (!) 57 60   Resp: 16    Temp: 98.8 °F (37.1 °C)    SpO2: 93% 97%       Physical Exam  Vitals signs and nursing note reviewed. Constitutional:       General: She is not in acute distress. Appearance: She is well-developed. She is not diaphoretic. HENT:      Mouth/Throat:      Mouth: Mucous membranes are moist.      Pharynx: Pharyngeal swelling and posterior oropharyngeal erythema present. No oropharyngeal exudate. Pulmonary:      Effort: Pulmonary effort is normal. No respiratory distress. Breath sounds: Normal breath sounds. No stridor. No wheezing, rhonchi or rales. Neurological:      Mental Status: She is alert. Psychiatric:         Behavior: Behavior normal.         Thought Content: Thought content normal.         Judgment: Judgment normal.         MDM    ICD-10-CM ICD-9-CM   1. Cough  R05 786.2   2. Nausea  R11.0 787.02   3. Sore throat  J02.9 462   4.  Exposure to COVID-19 virus  Z20.828 V01.79       Orders Placed This Encounter    UPPER RESPIRATORY CULTURE    NOVEL CORONAVIRUS (COVID-19)     Scheduling Instructions:      1) Due to current limited availability of the COVID-19 PCR test, tests will be prioritized and may not be completed.              2) Order only if the test result will change clinical management or necessary for a return to mission-critical employment decision.              3) Print and instruct patient to adhere to CDC home isolation program. (Link Above)              4) Set up or refer patient for a monitoring program.              5) Have patient sign up for and leverage MyChart (if not previously done). Order Specific Question:   Is this test for diagnosis or screening? Answer:   Diagnosis of ill patient     Order Specific Question:   Symptomatic for COVID-19 as defined by CDC? Answer:   Yes     Order Specific Question:   Date of Symptom Onset     Answer:   8/24/2020     Order Specific Question:   Hospitalized for COVID-19? Answer:   No     Order Specific Question:   Admitted to ICU for COVID-19? Answer:   No     Order Specific Question:   Employed in healthcare setting? Answer:   No     Order Specific Question:   Resident in a congregate (group) care setting? Answer:   No     Order Specific Question:   Pregnant? Answer:   No     Order Specific Question:   Previously tested for COVID-19? Answer:   No    AMB POC RAPID STREP A        Self Quarantine  Deep breathing exercises  Tylenol prn  Increase fluids    If signs and symptoms become worse the pt is to go to the ER.      Results for orders placed or performed in visit on 08/25/20   AMB POC RAPID STREP A   Result Value Ref Range    VALID INTERNAL CONTROL POC Yes     Group A Strep Ag Negative Negative       Procedures

## 2020-08-27 LAB — SARS-COV-2, NAA: NOT DETECTED

## 2020-08-28 LAB — BACTERIA SPEC RESP CULT: NORMAL

## 2021-01-04 ENCOUNTER — OFFICE VISIT (OUTPATIENT)
Dept: FAMILY MEDICINE CLINIC | Age: 56
End: 2021-01-04
Payer: COMMERCIAL

## 2021-01-04 VITALS
TEMPERATURE: 98.5 F | HEIGHT: 69 IN | BODY MASS INDEX: 34.83 KG/M2 | DIASTOLIC BLOOD PRESSURE: 90 MMHG | SYSTOLIC BLOOD PRESSURE: 152 MMHG | HEART RATE: 63 BPM | WEIGHT: 235.2 LBS | RESPIRATION RATE: 18 BRPM | OXYGEN SATURATION: 98 %

## 2021-01-04 DIAGNOSIS — Z23 ENCOUNTER FOR IMMUNIZATION: ICD-10-CM

## 2021-01-04 DIAGNOSIS — I10 BENIGN ESSENTIAL HYPERTENSION: ICD-10-CM

## 2021-01-04 DIAGNOSIS — E11.9 DIABETES MELLITUS TYPE 2, NONINSULIN DEPENDENT (HCC): ICD-10-CM

## 2021-01-04 DIAGNOSIS — E55.9 VITAMIN D DEFICIENCY: ICD-10-CM

## 2021-01-04 DIAGNOSIS — Z00.00 ROUTINE GENERAL MEDICAL EXAMINATION AT A HEALTH CARE FACILITY: Primary | ICD-10-CM

## 2021-01-04 DIAGNOSIS — E78.2 MIXED HYPERLIPIDEMIA: ICD-10-CM

## 2021-01-04 DIAGNOSIS — Z23 NEED FOR SHINGLES VACCINE: ICD-10-CM

## 2021-01-04 PROCEDURE — 90750 HZV VACC RECOMBINANT IM: CPT | Performed by: FAMILY MEDICINE

## 2021-01-04 PROCEDURE — 90471 IMMUNIZATION ADMIN: CPT | Performed by: FAMILY MEDICINE

## 2021-01-04 PROCEDURE — 99396 PREV VISIT EST AGE 40-64: CPT | Performed by: FAMILY MEDICINE

## 2021-01-04 RX ORDER — ASPIRIN 81 MG/1
81 TABLET ORAL DAILY
COMMUNITY
End: 2021-10-14 | Stop reason: ALTCHOICE

## 2021-01-04 NOTE — PROGRESS NOTES
Chief Complaint   Patient presents with    Complete Physical       1. Have you been to the ER, urgent care clinic since your last visit? Hospitalized since your last visit? No    2. Have you seen or consulted any other health care providers outside of the [address] [place] [address] since your last visit? Include any pap smears or colon screening. Wanda Quintana  is a 63 y.o.  female  who present for Shingrix immunizations/injections. He/she denies any symptoms , reactions or allergies that would exclude them from being immunized today. Risks and adverse reactions were discussed and the VIS was given if applicable to them. All questions were addressed. Chana Staley LPN           3 most recent PHQ Screens 1/4/2021   Little interest or pleasure in doing things Not at all   Feeling down, depressed, irritable, or hopeless Not at all   Total Score PHQ 2 0         Abuse Screening Questionnaire 12/3/2018   Do you ever feel afraid of your partner? N   Are you in a relationship with someone who physically or mentally threatens you? N   Is it safe for you to go home?  Latonya Freitas

## 2021-01-04 NOTE — PROGRESS NOTES
Chief Complaint   Patient presents with    Complete Physical     fasting    Diabetes    Cholesterol Problem       HISTORY OF PRESENT ILLNESS   HPI  Annual CPE  Follow up Type 2 NIDDM, Hyperlipidemia  On no medications. Has declined medical treatment w/ medication mgt over the years  Diet: Nothing special right now  Exercise: Walks 2 miles 2-3 x a week (low impact since  due to ortho issues)  Caffeine: 1 cup of coffee ~ 5 days a week  Weight: Has yo-yo'd between the 210's-230's x several years, currently back on the high end  Checks home BP's 1-2 x a month and usually runs in the 120's/60's  Checks BS's a few x a month as well  Fasting in AM runs 210's-220  After breakfast usually runs in the  range  Feels well except hip pain and episodic sciatica either side, followed by ortho, advised by ortho she needs to lose wt. She was also told to stop high impact exercise so now she is more sedentary than she used to be and she knows her wt gain is making matters worse. REVIEW OF SYMPTOMS   Review of Systems   Constitutional: Negative. HENT: Negative. Eyes: Negative. Respiratory: Negative. Cardiovascular: Negative. Gastrointestinal: Negative. Genitourinary: Negative. Neurological: Negative for dizziness, focal weakness and headaches. Endo/Heme/Allergies: Negative. Psychiatric/Behavioral: Negative.             PROBLEM LIST/MEDICAL HISTORY     Problem List  Date Reviewed: 1/4/2021          Codes Class Noted    Right-sided low back pain with right-sided sciatica ICD-10-CM: M54.41  ICD-9-CM: 724.3  12/4/2019    Overview Signed 12/4/2019  3:50 PM by MD Semaj [address] PT summer 2019; Pain Mgt, Dr. Loretta Hutchins Injections 10/2019 & 11/2019; MRI             Mixed hyperlipidemia ICD-10-CM: V31.6  ICD-9-CM: 272.2  3/14/2018        Diabetes mellitus type 2, noninsulin dependent ([facility] ICD-10-CM: E11.9  ICD-9-CM: 250.00  12/27/2016    Overview Signed 2016  2:21 PM by Kenton Nascimento MD     [id number]             Tear of medial meniscus of left knee ICD-10-CM: G81.334L  ICD-9-CM: 836.0  2016    Overview Signed 2016 10:42 AM by Kenton Nascimento MD     ~ Spring 2016, Ortho Dr Carmel Pedroza, scope scheduled 16             Benign essential hypertension ICD-10-CM: I10  ICD-9-CM: 401.1  2016        Screening exams for skin cancer ICD-10-CM: Z12.83  ICD-9-CM: V76.43  2016    Overview Signed 2016  2:38 PM by Kenton Nascimento MD     Derm Dr Quintana             Right carpal tunnel syndrome ICD-10-CM: G56.01  ICD-9-CM: 354.0  2015    Overview Addendum 2015 11:04 AM by Kenton Nascimento MD     ~ 2013 Dr Hoa Waters: EMG study + Impression:  The above electrodiagnostic study reveals evidence of mildly prolonged motor and sensory distal latency of right median nerve, suggesting mild median nerve entrapment at wrist.             Vertigo ICD-10-CM: R42  ICD-9-CM: 780.4  6/3/2013        Vitamin D deficiency ICD-10-CM: E55.9  ICD-9-CM: 268.9  2012    Overview Signed 2012  1:42 PM by Kenton Nascimento MD     2012             History of low back pain, DDD, mild scoliosis and mild-moderate disc protrusions ICD-10-CM: Z87.39  ICD-9-CM: V13.59  2012        Mild-moderate lumbar disc protrusions L4-5, L5-S1, mild scoliosis ICD-10-CM: M51.26  ICD-9-CM: 722.10  2012    Overview Signed 2012  3:01 PM by Kenton Nascimento MD     2007             Abnormal Pap smear s/p Cryotherapy ~  ICD-10-CM: [id number]  ICD-9-CM: WBX6106  2010    Overview Signed 2010  8:53 AM by Kenton Nascimento MD     GYN Dr Bul Abreu (normal spontaneous vaginal delivery) ICD-10-CM: O80  ICD-9-CM: 098  Unknown    Overview Signed 3/22/2010 10:24 AM by Kenton Nascimento MD     A2,  x2, SAB x2             Achilles tendonitis ICD-10-CM: M76.60  ICD-9-CM: 726.71  3/1/2010 Overview Signed 3/1/2010 11:14 AM by Agustin Worley     L>R s/p cortisone             Anxiety ICD-10-CM: F41.9  ICD-9-CM: 300.00  3/1/2010    Overview Signed 3/1/2010 11:14 AM by Agustin Worley     mild             H/O Abnormal Pap Smears 1980, 1998 ICD-9-CM: 796.9  3/1/2010    Overview Signed 3/1/2010 11:17 AM by Agustin Worley     [address] and 1998 (no Bx)             Congenital hip dysplasia ICD-10-CM: Q65.89  ICD-9-CM: 755.63  3/1/2010    Overview Signed 3/1/2010 11:18 AM by Agustin Worley     Left.  Recontruction, bone grafting 1969             Scoliosis ICD-10-CM: M41.9  ICD-9-CM: 737.30  8/1/2007    Overview Signed 3/1/2010 11:15 AM by Agustin Worlye     mild             Hip pain ICD-10-CM: M25.559  ICD-9-CM: 719.45  8/1/2007    Overview Signed 3/1/2010 11:15 AM by Agustin Worley     right             S/P Surgery for Right Lateral Epicondylitis, 2005 ICD-10-CM: [id number]  ICD-9-CM: 726.32  1/1/2005    Overview Addendum 5/8/2012  3:03 PM by Sunita Naylor MD     TOA             S/P LASIK surgery ICD-10-CM: F23.348  ICD-9-CM: V45.69  1/1/2005        GERD (gastroesophageal reflux disease) ICD-10-CM: K21.9  ICD-9-CM: 530.81  1/1/2003    Overview Addendum 5/8/2012  3:01 PM by Sunita Naylor MD     ([facility]) 2003                       PAST SURGICAL HISTORY     Past Surgical History:   Procedure Laterality Date    HAND/FINGER SURGERY UNLISTED  1997    right 3rd finger tendon rupture repair    HX COLONOSCOPY  04/19/2016    Polyp: tubular adenoma, repeat 3 yrs, Dr Katie Pearson HX COLONOSCOPY  07/2019    2 polyps, Dr. Margaret Anglin, Repeat 3 yrs    HX KNEE ARTHROSCOPY Left 12/13/2016    related to torn meniscus    HX MENISCUS REPAIR Right 01/31/2020    Dr. Marsha White  04/11/2013    Cervix intact; Dr Robert Olivares    congenital hip dysplasia, left hip reconstruction and bone grafting    [place] CRYOCAUTERY OF CERVIX  ~11/2008    Dr Hamilton Roca  10/09    R Achilles; Dr. Nevin Lucero  2005    right lateral epicondylitis surgery; TOA         MEDICATIONS     Current Outpatient Medications   Medication Sig    aspirin delayed-release 81 mg tablet Take 81 mg by mouth daily. Takes ~ 3 x a week    cholecalciferol, vitamin D3, (VITAMIN D3 PO) Take 3,000 Units by mouth daily.  polyethylene glycol (MIRALAX) 17 gram/dose powder MIX AND DRINK 17G BY MOUTH EVERY DAY*NO SUB PERRIGO*     No current facility-administered medications for this visit. ALLERGIES     Allergies   Allergen Reactions    Prednisone Shortness of Breath     SOB, flushed, red rash    Bleach (Sodium Hypochlorite) Other (comments)    Metformin Other (comments)     Severe depression and coldness in arms            SOCIAL HISTORY     Social History     Tobacco Use    Smoking status: Never Smoker    Smokeless tobacco: Never Used   Substance Use Topics    Alcohol use:  Yes     Alcohol/week: 0.0 standard drinks     Comment: maybe 2-3 drinks of wine/cocktails per year     Social History     Social History Narrative    Native of [place]; [facility] [place];  ~2006; Got re- 4/2012    Recalls getting part of her Hepatitis B series in the 1990's, not sure if she got #3    Diet: Nothing special right now    Exercise: Walks 2 miles 2-3 x a week (low impact since  due to ortho issues)    Caffeine: 1 cup of coffee ~ 5 days a week    Weight: Has yo-yo'd between the 210's-230's x several years, currently back on the high end         Social History     Substance and Sexual Activity   Sexual Activity Yes    Partners: Male    Birth control/protection: Surgical    Comment: Hysterectomy 2013       IMMUNIZATIONS     Immunization History   Administered Date(s) Administered    TD Vaccine 03/20/2009    Zoster Recombinant 01/04/2021         FAMILY HISTORY     Family History   Problem Relation Age of Onset    Arrhythmia Mother     Bipolar Disorder Mother     Cancer Mother         cervical dx in her late 29's   Selena Adams High Cholesterol Mother     Heart Disease Mother         heart arrhytmia    Other Mother         brain tumor diagnosed at age 67, stable    Diabetes Father         obesity    Arthritis-osteo Father         ? RA as well    Cancer Father         skin cancer    Gout Father     Heart Disease Maternal Grandmother         CHF in her 63's    Heart Disease Maternal Grandfather          79yo    Hypertension Maternal Grandfather     Diabetes Paternal Grandmother     Stroke Paternal Grandmother          in her [de-identified]    Arthritis-rheumatoid Paternal Grandmother     Cancer Paternal Grandfather         liver and colon CA,  mid 66's    Diabetes Paternal Grandfather     Depression Sister     Anxiety Sister     Other Sister         scoliosis    Other Daughter         Tourettes, tremor, tics, milk/soy allergy, lactose intolerance    Other Daughter         Tourettes, anxiety    Breast Cancer Paternal Aunt         dx in her 29's    Diabetes Paternal Aunt         type 2    Diabetes Paternal Uncle         type 2    Arthritis-rheumatoid Paternal Aunt     Gout Paternal Aunt          VITALS     Visit Vitals  BP (!) 152/90   Pulse 63   Temp 98.5 °F (36.9 °C) (Temporal)   Resp 18   Ht 5' 9\" (1.753 m)   Wt 235 lb 3.2 oz (106.7 kg)   LMP 2013   SpO2 98%   BMI 34.73 kg/m²          PHYSICAL EXAMINATION   Physical Exam  Vitals signs reviewed. Constitutional:       General: She is not in acute distress. HENT:      Right Ear: Tympanic membrane normal.      Left Ear: Tympanic membrane normal.   Eyes:      General: No scleral icterus. Neck:      Musculoskeletal: Neck supple. Thyroid: No thyroid mass, thyromegaly or thyroid tenderness. Vascular: No carotid bruit.    Cardiovascular:      Rate and Rhythm: Normal rate and regular rhythm. Pulses: Normal pulses. Dorsalis pedis pulses are 2+ on the right side and 2+ on the left side. Posterior tibial pulses are 2+ on the right side and 2+ on the left side. Heart sounds: Normal heart sounds. No murmur. No gallop. Pulmonary:      Effort: Pulmonary effort is normal.      Breath sounds: Normal breath sounds. Abdominal:      Palpations: Abdomen is soft. There is no mass. Tenderness: There is no abdominal tenderness. Musculoskeletal:         General: No swelling or tenderness. Right lower leg: No edema. Left lower leg: No edema. Feet:      Right foot:      Protective Sensation: 5 sites tested. 5 sites sensed. Skin integrity: Skin integrity normal.      Toenail Condition: Right toenails are normal.      Left foot:      Protective Sensation: 5 sites tested. 5 sites sensed. Skin integrity: Skin integrity normal.      Toenail Condition: Left toenails are normal.   Lymphadenopathy:      Cervical: No cervical adenopathy. Skin:     General: Skin is warm and dry. Neurological:      General: No focal deficit present. Mental Status: She is oriented to person, place, and time. Motor: Motor function is intact. Coordination: Coordination is intact. Gait: Gait is intact. Psychiatric:         Mood and Affect: Mood normal.                LABORATORY DATA/ANCILLARY/IMAGING         Lab Results   Component Value Date/Time    Hemoglobin A1c 6.1 (H) 11/30/2019 10:58 AM    Hemoglobin A1c 6.4 (H) 03/04/2019 08:41 AM    Hemoglobin A1c 6.5 (H) 12/03/2018 11:49 AM    Glucose 106 (H) 11/30/2019 10:58 AM    Microalb/Creat ratio (ug/mg creat.) <2.3 11/30/2019 10:58 AM    LDL, calculated 107 (H) 11/30/2019 10:58 AM    Creatinine 1.08 (H) 11/30/2019 10:58 AM          ASSESSMENT & PLAN       ICD-10-CM ICD-9-CM    1.  Routine general medical examination at a health care facility  Z00.00 V70.0 CBC W/O DIFF      METABOLIC PANEL, COMPREHENSIVE LIPID PANEL      TSH 3RD GENERATION      HEMOGLOBIN A1C WITH EAG      MICROALBUMIN, UR, RAND W/ MICROALB/CREAT RATIO      VITAMIN D, 25 HYDROXY      VITAMIN D, 25 HYDROXY      MICROALBUMIN, UR, RAND W/ MICROALB/CREAT RATIO      HEMOGLOBIN A1C WITH EAG      TSH 3RD GENERATION      LIPID PANEL      METABOLIC PANEL, COMPREHENSIVE      CBC W/O DIFF   2. Diabetes mellitus type 2, noninsulin dependent (HCC)  E11.9 250.00    3. Mixed hyperlipidemia  E78.2 272.2    4. Benign essential hypertension  I10 401.1    5. BMI 34.0-34.9,adult  Z68.34 V85.34    6. Vitamin D deficiency  E55.9 268.9    7. Need for shingles vaccine  Z23 V04.89 ZOSTER VACC RECOMBINANT ADJUVANTED      AK IMMUNIZ ADMIN,1 SINGLE/COMB VAC/TOXOID   8. Encounter for immunization  Z23 V03.89 ZOSTER VACC RECOMBINANT ADJUVANTED      AK IMMUNIZ ADMIN,1 SINGLE/COMB VAC/TOXOID     Fasting labs checked today  Cardiovascular risk and specific BP/BS/Hgba1c/Lipid/LDL goals reviewed  Reviewed diet, nutrition, exercise, weight management, BMI/goals. On no medications. Has declined medical treatment w/ medication mgt over the years  Age/risk based screening recommendations, health maintenance & prevention counseling. Cancer screening USPTFS guidelines reviewed w/ pt today. Discussed benefits/positive/negative outcomes of screening based on age/risk stratification. Informed consent for/against screening based on pt's personal hx/risk factors. Updated in history above and health maintenance. Due diabetes eye exam  Shingrix #1 given in office today w/o sequelae. Return for #2 8 weeks. Sees gyn annually for well woman visits/gyn exams. Last exam and pap reportedly normal   Mammogram screens done per gyn, most recent done 6-2020 at [facility]. Nurse requested mammogram report. Further follow up & other recommendations pending review of labs.

## 2021-01-05 LAB
25(OH)D3+25(OH)D2 SERPL-MCNC: 37.8 NG/ML (ref 30–100)
ALBUMIN SERPL-MCNC: 4.6 G/DL (ref 3.8–4.9)
ALBUMIN/GLOB SERPL: 1.8 {RATIO} (ref 1.2–2.2)
ALP SERPL-CCNC: 77 IU/L (ref 39–117)
ALT SERPL-CCNC: 18 IU/L (ref 0–32)
AST SERPL-CCNC: 21 IU/L (ref 0–40)
BILIRUB SERPL-MCNC: 1.3 MG/DL (ref 0–1.2)
BUN SERPL-MCNC: 13 MG/DL (ref 6–24)
BUN/CREAT SERPL: 16 (ref 9–23)
CALCIUM SERPL-MCNC: 10.2 MG/DL (ref 8.7–10.2)
CHLORIDE SERPL-SCNC: 101 MMOL/L (ref 96–106)
CHOLEST SERPL-MCNC: 235 MG/DL (ref 100–199)
CO2 SERPL-SCNC: 27 MMOL/L (ref 20–29)
CREAT SERPL-MCNC: 0.81 MG/DL (ref 0.57–1)
CREAT UR-MCNC: NORMAL MG/DL
ERYTHROCYTE [DISTWIDTH] IN BLOOD BY AUTOMATED COUNT: 13.3 % (ref 11.7–15.4)
EST. AVERAGE GLUCOSE BLD GHB EST-MCNC: 128 MG/DL
GLOBULIN SER CALC-MCNC: 2.5 G/DL (ref 1.5–4.5)
GLUCOSE SERPL-MCNC: 102 MG/DL (ref 65–99)
HBA1C MFR BLD: 6.1 % (ref 4.8–5.6)
HCT VFR BLD AUTO: 47.3 % (ref 34–46.6)
HDLC SERPL-MCNC: 68 MG/DL
HGB BLD-MCNC: 15.6 G/DL (ref 11.1–15.9)
INTERPRETATION, 910389: NORMAL
LDLC SERPL CALC-MCNC: 144 MG/DL (ref 0–99)
MCH RBC QN AUTO: 28.5 PG (ref 26.6–33)
MCHC RBC AUTO-ENTMCNC: 33 G/DL (ref 31.5–35.7)
MCV RBC AUTO: 86 FL (ref 79–97)
MICROALBUMIN UR-MCNC: NORMAL
PLATELET # BLD AUTO: 252 X10E3/UL (ref 150–450)
POTASSIUM SERPL-SCNC: 4.8 MMOL/L (ref 3.5–5.2)
PROT SERPL-MCNC: 7.1 G/DL (ref 6–8.5)
RBC # BLD AUTO: 5.48 X10E6/UL (ref 3.77–5.28)
SODIUM SERPL-SCNC: 140 MMOL/L (ref 134–144)
TRIGL SERPL-MCNC: 130 MG/DL (ref 0–149)
TSH SERPL DL<=0.005 MIU/L-ACNC: 1.41 UIU/ML (ref 0.45–4.5)
VLDLC SERPL CALC-MCNC: 23 MG/DL (ref 5–40)
WBC # BLD AUTO: 7 X10E3/UL (ref 3.4–10.8)

## 2021-01-10 ENCOUNTER — PATIENT MESSAGE (OUTPATIENT)
Dept: FAMILY MEDICINE CLINIC | Age: 56
End: 2021-01-10

## 2021-01-20 ENCOUNTER — PATIENT MESSAGE (OUTPATIENT)
Dept: FAMILY MEDICINE CLINIC | Age: 56
End: 2021-01-20

## 2021-02-19 ENCOUNTER — TELEPHONE (OUTPATIENT)
Dept: FAMILY MEDICINE CLINIC | Age: 56
End: 2021-02-19

## 2021-02-19 NOTE — TELEPHONE ENCOUNTER
Incoming call transferred to Nurse. Pt stated that she has been having pain on the left side of her chest radiating to her left shoulder since 2/17/21. Pt also reports having SOB and coughing. Pt advised to been seen in the ER or UC to be further evaluated. Pt agreed and voiced understanding. Stated she will go to a [place] [employer] location.  Freida Silva LPN

## 2021-02-23 ENCOUNTER — OFFICE VISIT (OUTPATIENT)
Dept: URGENT CARE | Age: 56
End: 2021-02-23
Payer: COMMERCIAL

## 2021-02-23 ENCOUNTER — OFFICE VISIT (OUTPATIENT)
Dept: FAMILY MEDICINE CLINIC | Age: 56
End: 2021-02-23

## 2021-02-23 VITALS
WEIGHT: 243.8 LBS | RESPIRATION RATE: 18 BRPM | SYSTOLIC BLOOD PRESSURE: 148 MMHG | HEART RATE: 71 BPM | TEMPERATURE: 97.5 F | BODY MASS INDEX: 36.11 KG/M2 | HEIGHT: 69 IN | DIASTOLIC BLOOD PRESSURE: 90 MMHG | OXYGEN SATURATION: 97 %

## 2021-02-23 VITALS — OXYGEN SATURATION: 97 % | RESPIRATION RATE: 17 BRPM | TEMPERATURE: 99 F | HEART RATE: 58 BPM

## 2021-02-23 DIAGNOSIS — R07.81 PLEURITIC CHEST PAIN: ICD-10-CM

## 2021-02-23 DIAGNOSIS — R06.09 DOE (DYSPNEA ON EXERTION): ICD-10-CM

## 2021-02-23 DIAGNOSIS — Z11.59 SCREENING FOR VIRAL DISEASE: ICD-10-CM

## 2021-02-23 DIAGNOSIS — E78.2 MIXED HYPERLIPIDEMIA: ICD-10-CM

## 2021-02-23 DIAGNOSIS — I10 BENIGN ESSENTIAL HYPERTENSION: ICD-10-CM

## 2021-02-23 DIAGNOSIS — Z53.20 MEDICATION THERAPY MANAGEMENT RECOMMENDATION REFUSED BY PATIENT: ICD-10-CM

## 2021-02-23 DIAGNOSIS — E11.9 DIABETES MELLITUS TYPE 2, NONINSULIN DEPENDENT (HCC): ICD-10-CM

## 2021-02-23 DIAGNOSIS — R05.9 COUGH: Primary | ICD-10-CM

## 2021-02-23 DIAGNOSIS — R07.89 CHEST DISCOMFORT: Primary | ICD-10-CM

## 2021-02-23 DIAGNOSIS — F43.9 STRESS: ICD-10-CM

## 2021-02-23 DIAGNOSIS — R07.9 LEFT-SIDED CHEST PAIN: ICD-10-CM

## 2021-02-23 DIAGNOSIS — E66.01 OBESITY, MORBID (HCC): ICD-10-CM

## 2021-02-23 LAB — SARS-COV-2 POC: NEGATIVE

## 2021-02-23 PROCEDURE — 93000 ELECTROCARDIOGRAM COMPLETE: CPT | Performed by: FAMILY MEDICINE

## 2021-02-23 PROCEDURE — 87426 SARSCOV CORONAVIRUS AG IA: CPT | Performed by: FAMILY MEDICINE

## 2021-02-23 PROCEDURE — 99213 OFFICE O/P EST LOW 20 MIN: CPT | Performed by: FAMILY MEDICINE

## 2021-02-23 PROCEDURE — 99215 OFFICE O/P EST HI 40 MIN: CPT | Performed by: FAMILY MEDICINE

## 2021-02-23 NOTE — PROGRESS NOTES
Chief Complaint   Patient presents with    Follow-up     chest discomfort, SOB     1. Have you been to the ER, urgent care clinic since your last visit? Hospitalized since your last visit? No    2. Have you seen or consulted any other health care providers outside of the [address] [place] [address] since your last visit? Include any pap smears or colon screening.  No

## 2021-02-23 NOTE — PROGRESS NOTES
This patient was seen at [address] [place] [address] [facility]gent [facility] while in their vehicle due to COVID-19 pandemic with PPE and focused examination in order to decrease community viral transmission. The patient/guardian gave verbal consent to treat. Mat Hartley is a 61 y.o. female who presents with dry cough, slight SOB, and left sided pleuritic chest pain x 6 days. Reports improving. No known COVID-19 contacts. Denies fever. Eating/drinking well. The history is provided by the patient.         Past Medical History:   Diagnosis Date    Abnormal Pap smear s/p Cryotherapy ~ 2010    Achilles tendonitis     Bilateral; R>L, chronic, refractory    Anxiety     Mild; treated briefly    Benign essential hypertension 2016    Borderline hypertension 2016    Congenital hip dysplasia 1969    Reconstruction w/ bone grafting    Diabetes mellitus type 2, noninsulin dependent ([facility] [address] 2016    GERD (gastroesophageal reflux disease)     UGI    Hip pain 2007    chronic R Hip pain    History of low back pain, DDD, mild scoliosis and mild-moderate disc protrusions 2012    Meniscus tear 2016    right knee    Mild-moderate lumbar disc protrusions L4-5, L5-S1, mild scoliosis 2012    Mixed hyperlipidemia 3/14/2018    Mononucleosis 2009     (normal spontaneous vaginal delivery)     A2,  x2, SAB x2    Pap smear abnormality ,     Cryotx     Right carpal tunnel syndrome 2015    ~ 2013: EMG study +    Right-sided low back pain with right-sided sciatica 2019    [facility]th[facility] Dr. Janae Mcmahon, PT summer ; Pain Mgt, Dr. Nancy Archuleta Injections 10/2019 & 2019; MRI    Scoliosis 2007    Screening exams for skin cancer 2016    Derm Dr Rivero Yousif Strep throat 2009    Tendon rupture 1997    R 3rd digit tendon rupture and repair    Vitamin D deficiency 2012        Past Surgical History:   Procedure Laterality Date    HAND/FINGER SURGERY UNLISTED      right 3rd finger tendon rupture repair    HX COLONOSCOPY  2016    Polyp: tubular adenoma, repeat 3 yrs, Dr Kaylee Hammer HX COLONOSCOPY  2019    2 polyps, Dr. Viviana Vivar, Repeat 3 yrs    HX KNEE ARTHROSCOPY Left 2016    related to torn meniscus    HX MENISCUS REPAIR Right 2020    Dr. Edinson Almeida HX PARTIAL HYSTERECTOMY  2013    Cervix intact; Dr Lucina Fallon    congenital hip dysplasia, left hip reconstruction and bone grafting    HI CRYOCAUTERY OF CERVIX  ~2008    Dr Fitzpatrick Morning  10/09    R Achilles; Dr. Karla Mercer      right lateral epicondylitis surgery; TOA         Family History   Problem Relation Age of Onset    Arrhythmia Mother     Bipolar Disorder Mother     Cancer Mother         cervical dx in her late 29's    High Cholesterol Mother     Heart Disease Mother         heart arrhytmia    Other Mother         brain tumor diagnosed at age 67, stable    Diabetes Father         obesity    Arthritis-osteo Father         ? RA as well    Cancer Father         skin cancer    Gout Father     Coronary Artery Disease Father         stent age 75 yo    Heart Disease Maternal Grandmother         CHF in her 63's    Heart Disease Maternal Grandfather          79yo    Hypertension Maternal Grandfather     Diabetes Paternal Grandmother     Stroke Paternal Grandmother          in her [de-identified]    Arthritis-rheumatoid Paternal Grandmother     Cancer Paternal Grandfather         liver and colon CA,  mid 66's    Diabetes Paternal Grandfather     Depression Sister     Anxiety Sister     Other Sister         scoliosis    Other Daughter         Tourettes, tremor, tics, milk/soy allergy, lactose intolerance    Other Daughter         Tourettes, anxiety    Breast Cancer Paternal Aunt         dx in her 29's    Diabetes Paternal Aunt         type 2    Diabetes Paternal Uncle         type 2    Arthritis-rheumatoid Paternal Aunt     Gout Paternal Aunt         Social History     Socioeconomic History    Marital status:      Spouse name: Not on file    Number of children: 2    Years of education: Not on file    Highest education level: Not on file   Occupational History    Occupation: Speech Pathologist for [employer] Financial resource strain: Not on file    Food insecurity     Worry: Not on file     Inability: Not on file   [employer] needs     Medical: Not on file     Non-medical: Not on file   Tobacco Use    Smoking status: Never Smoker    Smokeless tobacco: Never Used   Substance and Sexual Activity    Alcohol use:  Yes     Alcohol/week: 0.0 standard drinks     Comment: maybe 2-3 drinks of wine/cocktails per year    Drug use: No    Sexual activity: Yes     Partners: Male     Birth control/protection: Surgical     Comment: Hysterectomy 2013   Lifestyle    Physical activity     Days per week: Not on file     Minutes per session: Not on file    Stress: Not on file   Relationships    Social connections     Talks on phone: Not on file     Gets together: Not on file     Attends Yazdanism service: Not on file     Active member of club or organization: Not on file     Attends meetings of clubs or organizations: Not on file     Relationship status: Not on file    Intimate partner violence     Fear of current or ex partner: Not on file     Emotionally abused: Not on file     Physically abused: Not on file     Forced sexual activity: Not on file   Other Topics Concern     Service Not Asked    Blood Transfusions Not Asked    Caffeine Concern No     Comment: 1 cup of coffee a day maybe 4-5 days a week    Occupational Exposure Not Asked   [place] Hazards Not Asked    Sleep Concern Not Asked    Stress Concern Not Asked    Weight Concern Yes     Comment: since eating healthier 4-2019, wt is down ~35-40 lbs    Special Diet Yes     Comment: since 4-2019: 1500 calorie diet, low carb (50 g/day), high protein    Back Care Yes     Comment: Ortho Dr. Maritza Joyce, Pain Mgt Dr Dinorah Monreal Injections summer/fall 2019, PT summer 2019    Exercise Yes     Comment: ellipitical 2 x a week x 1 hr, walks on weekends x 1 hr each time    Bike Helmet Not Asked   [address] Not Asked    Self-Exams Not Asked   Social History Narrative    Native of [place]; [place] [facility] [place];  ~[place]; Got re- 4/2012    Recalls getting part of her Hepatitis B series in the 1990's, not sure if she got #3    Diet: Nothing special right now    Exercise: Walks 2 miles 2-3 x a week (low impact since  due to ortho issues)    Caffeine: 1 cup of coffee ~ 5 days a week    Weight: Has yo-yo'd between the 210's-230's x several years, currently back on the high end                    ALLERGIES: Prednisone, Bleach (sodium hypochlorite), and Metformin    Review of Systems   Constitutional: Negative for activity change, appetite change and fever. HENT: Negative for sore throat. Respiratory: Positive for cough and shortness of breath. Cardiovascular: Positive for chest pain. Gastrointestinal: Negative for abdominal pain, diarrhea, nausea and vomiting. Musculoskeletal: Negative for myalgias. Neurological: Negative for headaches. Vitals:    02/23/21 1240   Pulse: (!) 58   Resp: 17   Temp: 99 °F (37.2 °C)   SpO2: 97%       Physical Exam  Vitals signs and nursing note reviewed. Constitutional:       General: She is not in acute distress. Appearance: She is well-developed. She is not diaphoretic. Pulmonary:      Effort: Pulmonary effort is normal. No respiratory distress. Breath sounds: Normal breath sounds. No stridor. No wheezing, rhonchi or rales. Neurological:      Mental Status: She is alert.    Psychiatric:         Behavior: Behavior normal.         Thought Content: Thought content normal.         Judgment: Judgment normal.         MDM     Amount and/or Complexity of Data Reviewed:   Clinical lab tests:  Ordered and reviewed      ICD-10-CM ICD-9-CM   1. Cough  R05 786.2   2. Pleuritic chest pain  R07.81 786.52   3. Screening for viral disease  Z11.59 V73.99       Orders Placed This Encounter    NOVEL CORONAVIRUS (COVID-19)     Scheduling Instructions:      1) Due to current limited availability of the COVID-19 PCR test, tests will be prioritized and may not be completed.              2) Order only if the test result will change clinical management or necessary for a return to mission-critical employment decision.              3) Print and instruct patient to adhere to CDC home isolation program. (Link Above)              4) Set up or refer patient for a monitoring program.              5) Have patient sign up for and leverage [employer] (if not previously done). Order Specific Question:   Is this test for diagnosis or screening? Answer:   Diagnosis of ill patient     Order Specific Question:   Symptomatic for COVID-19 as defined by CDC? Answer:   Yes     Order Specific Question:   Date of Symptom Onset     Answer:   2/17/2021     Order Specific Question:   Hospitalized for COVID-19? Answer:   No     Order Specific Question:   Admitted to ICU for COVID-19? Answer:   No     Order Specific Question:   Employed in healthcare setting? Answer:   No     Order Specific Question:   Resident in a congregate (group) care setting? Answer:   No     Order Specific Question:   Pregnant? Answer:   No     Order Specific Question:   Previously tested for COVID-19? Answer: Yes    AMB POC SARS-COV-2     Order Specific Question:   Is this test for diagnosis or screening? Answer:   Diagnosis of ill patient     Order Specific Question:   Symptomatic for COVID-19 as defined by CDC?      Answer:   Yes     Order Specific Question:   Date of Symptom Onset Answer:   2/17/2021     Order Specific Question:   Hospitalized for COVID-19? Answer:   No     Order Specific Question:   Admitted to ICU for COVID-19? Answer:   No     Order Specific Question:   Employed in healthcare setting? Answer:   No     Order Specific Question:   Resident in a congregate (group) care setting? Answer:   No     Order Specific Question:   Pregnant? Answer:   No     Order Specific Question:   Previously tested for COVID-19? Answer:   Yes      Rapid COVID-19 negative  Quarantine, await PCR COVID-19 results  Deep breathing exercises, ambulation  Tylenol prn  Increase fluids with electrolytes if decreased PO intake  MyChart: Active  Provider will call if PCR test is positive     If signs and symptoms become worse the pt is to go to the ER.      Results for orders placed or performed in visit on 02/23/21   AMB POC SARS-COV-2   Result Value Ref Range    SARS-COV-2 POC Negative Negative       Procedures

## 2021-02-23 NOTE — PROGRESS NOTES
Chief Complaint   Patient presents with    Chest Pain     on and off x 1 week    Stress       HISTORY OF PRESENT ILLNESS   HPI  Patient states that about 1 week ago while having an argument w/ her daughter she began to develop left sided chest tightness and tension sensation that ran all along the left side of her body. It did not radiate to her neck or arm. She had not H/V, diaphoresis, palpitations or sob during that occurrence. There were no other associated symptoms w/ it at the time. She was very upset but her chest gradually started feeling better after calming herself and gathering herself a few hours later. She felt ok the rest of the night. The next morning she had some generalized soreness and aching across the left side of her chest. She felt slightly \"winded\" when trying to play w/ her grandkids that day. She felt twinges of sharp pains in the left side of her chest when taking in a deep breath. A few days later she recalls having a \"coughing fit\" in her sleep and the next day she had a slight dry cough a few times throughout the day. The cough went away and has not bothered her since. A few days ago she was out shoveling ice off her driveway. The next day she felt sore in the left side of her chest extending into her left upper back and under the shoulder blade. It was tight, sharp and sore to touch. She had the area massaged and it felt better because it finally felt like a knot was worked out in her back. That is also better. She has been under a great deal of stress lately. She is worried about reporting back to work during [place] pandemic and she feels that has created a lot of anxiety for her. Denies exertional chest pain. Denies sputum, wheezing, heart palpitations. Denies fevers, chills, sweats, URI sx or recent illnesses. Denies abdominal pain, n/v.   Denies heartburn, indigestion or acid reflux.        REVIEW OF SYMPTOMS   Review of Systems   Constitutional: Negative for chills and fever.   Respiratory: Negative for hemoptysis, sputum production and wheezing. Cardiovascular: Negative for palpitations and leg swelling. Gastrointestinal: Negative. Negative for abdominal pain, heartburn, nausea and vomiting. Neurological: Negative. Endo/Heme/Allergies: Negative.             PROBLEM LIST/MEDICAL HISTORY     Problem List  Date Reviewed: 2/23/2021          Codes Class Noted    Right-sided low back pain with right-sided sciatica ICD-10-CM: M54.41  ICD-9-CM: 724.3  12/4/2019    Overview Signed 12/4/2019  3:50 PM by MD Sterling Yu [facility] Dr. Mcclelland See, PT summer 2019; Pain Mgt, Dr. Covarrubias Adiel Injections 10/2019 & 11/2019; MRI             Mixed hyperlipidemia ICD-10-CM: A84.1  ICD-9-CM: 272.2  3/14/2018        Diabetes mellitus type 2, noninsulin dependent ([facility] ICD-10-CM: E11.9  ICD-9-CM: 250.00  12/27/2016    Overview Signed 12/27/2016  2:21 PM by Bettye Barron MD     11-28-16             Tear of medial meniscus of left knee ICD-10-CM: R08.260S  ICD-9-CM: 836.0  11/28/2016    Overview Signed 11/28/2016 10:42 AM by Bettye Barron MD     ~ Spring 2016, Ortho Dr Clay Daily, scope scheduled 12-13-16             Benign essential hypertension ICD-10-CM: I10  ICD-9-CM: 401.1  11/28/2016        Screening exams for skin cancer ICD-10-CM: Z12.83  ICD-9-CM: V76.43  5/5/2016    Overview Signed 5/5/2016  2:38 PM by MD Uyen Yu Dr             Right carpal tunnel syndrome ICD-10-CM: G56.01  ICD-9-CM: 354.0  5/8/2015    Overview Addendum 5/8/2015 11:04 AM by Bettye Barron MD     ~ 6/2013 Dr Lazaro Curiel: EMG study + Impression:  The above electrodiagnostic study reveals evidence of mildly prolonged motor and sensory distal latency of right median nerve, suggesting mild median nerve entrapment at wrist.             Vertigo ICD-10-CM: R42  ICD-9-CM: 780.4  6/3/2013        Vitamin D deficiency ICD-10-CM: E55.9  ICD-9-CM: 268.9 2012    Overview Signed 2012  1:42 PM by Alma Anne MD     2012             History of low back pain, DDD, mild scoliosis and mild-moderate disc protrusions ICD-10-CM: Z87.39  ICD-9-CM: V13.59  2012        Mild-moderate lumbar disc protrusions L4-5, L5-S1, mild scoliosis ICD-10-CM: M51.26  ICD-9-CM: 722.10  2012    Overview Signed 2012  3:01 PM by Alma Anne MD     2007             Abnormal Pap smear s/p Cryotherapy ~  ICD-10-CM: [id number]  ICD-9-CM: DME7885  2010    Overview Signed 2010  8:53 AM by Alma Anne MD     GYN Dr Cricket Elizondo (normal spontaneous vaginal delivery) ICD-10-CM: O80  ICD-9-CM: 189  Unknown    Overview Signed 3/22/2010 10:24 AM by Alma Anne MD     A2,  x2, SAB x2             Achilles tendonitis ICD-10-CM: M76.60  ICD-9-CM: 726.71  3/1/2010    Overview Signed 3/1/2010 11:14 AM by Arielle Desai     L>R s/p cortisone             Anxiety ICD-10-CM: F41.9  ICD-9-CM: 300.00  3/1/2010    Overview Signed 3/1/2010 11:14 AM by Arielle Desai     mild             H/O Abnormal Pap Smears ,  ICD-9-CM: 796.9  3/1/2010    Overview Signed 3/1/2010 11:17 AM by Arielle Desai     3631'P and  (no Bx)             Congenital hip dysplasia ICD-10-CM: Q65.89  ICD-9-CM: 755.63  3/1/2010    Overview Signed 3/1/2010 11:18 AM by Arielle Desai     Left.  Recontruction, bone grafting 1969             Scoliosis ICD-10-CM: M41.9  ICD-9-CM: 737.30  2007    Overview Signed 3/1/2010 11:15 AM by Arielle Desai     mild             Hip pain ICD-10-CM: M25.559  ICD-9-CM: 719.45  2007    Overview Signed 3/1/2010 11:15 AM by Arielle Desai     right             S/P Surgery for Right Lateral Epicondylitis,  ICD-10-CM: [id number]  ICD-9-CM: 726.32  2005    Overview Addendum 2012  3:03 PM by MD LEWIS Arshad             S/P VIANCA surgery ICD-10-CM: Z98.890  ICD-9-CM: V45.69  1/1/2005        GERD (gastroesophageal reflux disease) ICD-10-CM: K21.9  ICD-9-CM: 530.81  1/1/2003    Overview Addendum 5/8/2012  3:01 PM by Gwen Barker MD     ([facility]) 2003                       PAST SURGICAL HISTORY     Past Surgical History:   Procedure Laterality Date    HAND/FINGER SURGERY UNLISTED  1997    right 3rd finger tendon rupture repair    HX COLONOSCOPY  04/19/2016    Polyp: tubular adenoma, repeat 3 yrs, Dr Roe Sheppard HX COLONOSCOPY  07/2019    2 polyps, Dr. Reji León, Repeat 3 yrs    HX KNEE ARTHROSCOPY Left 12/13/2016    related to torn meniscus    HX MENISCUS REPAIR Right 01/31/2020    Dr. Lisha Tesfaye HX PARTIAL HYSTERECTOMY  04/11/2013    Cervix intact; Dr Jaimie Iyer    congenital hip dysplasia, left hip reconstruction and bone grafting    [place] CRYOCAUTERY OF CERVIX  ~11/2008    Dr Babita Calabrese  10/09    R Achilles; Dr. Jethro Banks  2005    right lateral epicondylitis surgery; TOA         MEDICATIONS     Current Outpatient Medications   Medication Sig    aspirin delayed-release 81 mg tablet Take 81 mg by mouth daily. Takes ~ 3 x a week    cholecalciferol, vitamin D3, (VITAMIN D3 PO) Take 3,000 Units by mouth daily.  polyethylene glycol (MIRALAX) 17 gram/dose powder MIX AND DRINK 17G BY MOUTH EVERY DAY*NO SUB PERRIGO*     No current facility-administered medications for this visit. ALLERGIES     Allergies   Allergen Reactions    Prednisone Shortness of Breath     SOB, flushed, red rash    Bleach (Sodium Hypochlorite) Other (comments)    Metformin Other (comments)     Severe depression and coldness in arms            SOCIAL HISTORY     Social History     Tobacco Use    Smoking status: Never Smoker    Smokeless tobacco: Never Used   Substance Use Topics    Alcohol use:  Yes     Alcohol/week: 0.0 standard drinks     Comment: maybe 2-3 drinks of wine/cocktails per year     Social History     Social History Narrative    Native of [place]; moved [place] to [place] ;  ~; Got re- 2012    Recalls getting part of her Hepatitis B series in the , not sure if she got #3    Diet: Nothing special right now    Exercise: Walks 2 miles 2-3 x a week (low impact since  due to ortho issues)    Caffeine: 1 cup of coffee ~ 5 days a week    Weight: Has yo-yo'd between the 210's-230's x several years, but the past few years her weight has been creeping up         Social History     Substance and Sexual Activity   Sexual Activity Yes    Partners: Male    Birth control/protection: Surgical    Comment: Hysterectomy        IMMUNIZATIONS     Immunization History   Administered Date(s) Administered    TD Vaccine 2009    Zoster Recombinant 2021         FAMILY HISTORY     Family History   Problem Relation Age of Onset    Arrhythmia Mother     Bipolar Disorder Mother     Cancer Mother         cervical dx in her late 29's   Ranjan Loser High Cholesterol Mother     Heart Disease Mother         heart arrhytmia    Other Mother         brain tumor diagnosed at age 67, stable    Diabetes Father         obesity    Arthritis-osteo Father         ? RA as well    Cancer Father         skin cancer    Gout Father     Coronary Artery Disease Father         stent age 77 yo    Heart Disease Maternal Grandmother         CHF in her 63's    Heart Disease Maternal Grandfather          81yo    Hypertension Maternal Grandfather     Diabetes Paternal Grandmother     Stroke Paternal Grandmother          in her [de-identified]    Arthritis-rheumatoid Paternal Grandmother     Cancer Paternal Grandfather         liver and colon CA,  mid 66's    Diabetes Paternal Grandfather     Depression Sister     Anxiety Sister     Other Sister         scoliosis    Other Daughter         Tourettes, tremor, tics, milk/soy allergy, lactose intolerance   • Other Daughter         Tourettes, anxiety   • Breast Cancer Paternal Aunt         dx in her 30's   • Diabetes Paternal Aunt         type 2   • Diabetes Paternal Uncle         type 2   • Arthritis-rheumatoid Paternal Aunt    • Gout Paternal Aunt          VITALS     Visit Vitals  BP (!) 148/90 repeated end of exam; initial BP was 138/100   Pulse 71   Temp 97.5 °F (36.4 °C) (Temporal)   Resp 18   Ht 5' 9\" (1.753 m)   Wt 243 lb 12.8 oz (110.6 kg)   LMP 03/16/2013   SpO2 97%   BMI 36.00 kg/m²          PHYSICAL EXAMINATION   Physical Exam  Vitals signs reviewed.   Constitutional:       General: She is not in acute distress.     Appearance: She is not ill-appearing.   Eyes:      Conjunctiva/sclera: Conjunctivae normal.   Neck:      Musculoskeletal: Neck supple.   Cardiovascular:      Rate and Rhythm: Normal rate and regular rhythm.      Heart sounds: Normal heart sounds. No murmur. No gallop.    Pulmonary:      Effort: Pulmonary effort is normal. No respiratory distress.      Breath sounds: Normal breath sounds. No wheezing or rales.          Comments: Illustration denotes region or reproducilble tenderness  Chest:          Comments: Illustration denotes region or reproducilble tenderness  Abdominal:      Palpations: Abdomen is soft.      Tenderness: There is no abdominal tenderness.   Musculoskeletal:      Right lower leg: No edema.      Left lower leg: No edema.   Lymphadenopathy:      Cervical: No cervical adenopathy.   Skin:     General: Skin is warm and dry.   Neurological:      Mental Status: She is alert.   Psychiatric:         Mood and Affect: Mood normal.                LABORATORY DATA/ANCILLARY/IMAGING     Results for orders placed or performed in visit on 01/04/21   VITAMIN D, 25 HYDROXY   Result Value Ref Range    VITAMIN D, 25-HYDROXY 37.8 30.0 - 100.0 ng/mL   MICROALBUMIN, UR, RAND W/ MICROALB/CREAT RATIO   Result Value Ref Range    Creatinine, urine CANCELED  mg/dL    Microalbumin, urine CANCELED    HEMOGLOBIN A1C WITH EAG   Result Value Ref Range    Hemoglobin A1c 6.1 (H) 4.8 - 5.6 %    Estimated average glucose 128 mg/dL   TSH 3RD GENERATION   Result Value Ref Range    TSH 1.410 0.450 - 4.500 uIU/mL   LIPID PANEL   Result Value Ref Range    Cholesterol, total 235 (H) 100 - 199 mg/dL    Triglyceride 130 0 - 149 mg/dL    HDL Cholesterol 68 >39 mg/dL    VLDL, calculated 23 5 - 40 mg/dL    LDL, calculated 144 (H) 0 - 99 mg/dL   METABOLIC PANEL, COMPREHENSIVE   Result Value Ref Range    Glucose 102 (H) 65 - 99 mg/dL    BUN 13 6 - 24 mg/dL    Creatinine 0.81 0.57 - 1.00 mg/dL    GFR est non-AA 81 >59 mL/min/1.73    GFR est AA 94 >59 mL/min/1.73    BUN/Creatinine ratio 16 9 - 23    Sodium 140 134 - 144 mmol/L    Potassium 4.8 3.5 - 5.2 mmol/L    Chloride 101 96 - 106 mmol/L    CO2 27 20 - 29 mmol/L    Calcium 10.2 8.7 - 10.2 mg/dL    Protein, total 7.1 6.0 - 8.5 g/dL    Albumin 4.6 3.8 - 4.9 g/dL    GLOBULIN, TOTAL 2.5 1.5 - 4.5 g/dL    A-G Ratio 1.8 1.2 - 2.2    Bilirubin, total 1.3 (H) 0.0 - 1.2 mg/dL    Alk. phosphatase 77 39 - 117 IU/L    AST (SGOT) 21 0 - 40 IU/L    ALT (SGPT) 18 0 - 32 IU/L   CBC W/O DIFF   Result Value Ref Range    WBC 7.0 3.4 - 10.8 x10E3/uL    RBC 5.48 (H) 3.77 - 5.28 x10E6/uL    HGB 15.6 11.1 - 15.9 g/dL    HCT 47.3 (H) 34.0 - 46.6 %    MCV 86 79 - 97 fL    MCH 28.5 26.6 - 33.0 pg    MCHC 33.0 31.5 - 35.7 g/dL    RDW 13.3 11.7 - 15.4 %    PLATELET 057 985 - 049 x10E3/uL   CVD REPORT   Result Value Ref Range    INTERPRETATION Note             ASSESSMENT & PLAN   Diagnoses and all orders for this visit:    1. Chest discomfort  -     AMB POC EKG ROUTINE W/ 12 LEADS, INTER & REP  -     XR CHEST PA LAT; Future  -     ECHO STRESS; Future    2. Left-sided chest pain  -     XR CHEST PA LAT; Future    3. TERRAZAS (dyspnea on exertion)  -     ECHO STRESS; Future    4. Stress    5. Benign essential hypertension    6.  Diabetes mellitus type 2, noninsulin dependent ([facility]    7. Obesity, morbid ([facility]    8. Mixed hyperlipidemia    9. BMI 36.0-36.9,adult    10. Medication therapy management recommendation refused by patient      Nurse triaged patient's call about these complaints back on 2- at which time it was advised that she go to ER for further evaluation and management. She ended up going to an outpatient urgent care clinic but due to insurance, it was advised she go to the ER but patient refused and presents here today instead. EKG: NSR, Rate 61, non acute. CXR ordered. Referred for Covid testing. Differential diagnoses r/w pt including chest wall pain/strain, costochondritis, stress, cardiac/anginal equivalent, respiratory/RAD vs infectious, esophageal. Most high suspicion is musculoskeletal/costochondritis. Given her risk factors will check stress echo as well. She refuses treatment for her HBP, cholesterol or diabetes despite being aware of CV risks. She chooses to work on diet/exercise and wt reduction on her own. For now I recommend rest, warm compresses, massage, Aleve bid w/ food. However I have counseled patient at length today about assessment, management options, current recommended treatment plan, expectant course, worsening signs & symptoms w/ instructions for appropriate follow up. Follow up if not improving. Call back sooner for worsening symptoms or failure to improve w/in expectant course. ER evaluation for any severe/new/concerning symptoms as discussed in detail in office today. Patient expresses understanding and agreement with plan of care. Spent 40 minutes reviewing previous notes, test results and face to face with the patient discussing the diagnosis and importance of compliance with the treatment plan as well as documenting on the day of the visit.

## 2021-02-24 ENCOUNTER — TELEPHONE (OUTPATIENT)
Dept: FAMILY MEDICINE CLINIC | Age: 56
End: 2021-02-24

## 2021-02-24 LAB — SARS-COV-2, NAA: NOT DETECTED

## 2021-02-24 NOTE — TELEPHONE ENCOUNTER
Called, spoke to pt. Two pt identifiers confirmed. Patient   advise that her CXR was normal. Pt verbalized understanding of information discussed w/ no further questions at this time.

## 2021-03-08 ENCOUNTER — CLINICAL SUPPORT (OUTPATIENT)
Dept: FAMILY MEDICINE CLINIC | Age: 56
End: 2021-03-08
Payer: COMMERCIAL

## 2021-03-08 DIAGNOSIS — Z23 ENCOUNTER FOR IMMUNIZATION: Primary | ICD-10-CM

## 2021-03-08 PROCEDURE — 90750 HZV VACC RECOMBINANT IM: CPT | Performed by: FAMILY MEDICINE

## 2021-03-08 PROCEDURE — 90471 IMMUNIZATION ADMIN: CPT | Performed by: FAMILY MEDICINE

## 2021-03-08 NOTE — PROGRESS NOTES
Mat Hartley  is a 61 y.o.  female  who present for Shingrix immunizations/injections. He/she denies any symptoms , reactions or allergies that would exclude them from being immunized today. Risks and adverse reactions were discussed and the VIS was given if applicable to them. All questions were addressed.     Karon Salmeron LPN

## 2021-03-19 ENCOUNTER — OFFICE VISIT (OUTPATIENT)
Dept: CARDIOLOGY CLINIC | Age: 56
End: 2021-03-19
Payer: COMMERCIAL

## 2021-03-19 VITALS
HEART RATE: 81 BPM | HEIGHT: 69 IN | SYSTOLIC BLOOD PRESSURE: 150 MMHG | RESPIRATION RATE: 18 BRPM | OXYGEN SATURATION: 98 % | BODY MASS INDEX: 36.14 KG/M2 | WEIGHT: 244 LBS | DIASTOLIC BLOOD PRESSURE: 110 MMHG

## 2021-03-19 DIAGNOSIS — E78.2 MIXED HYPERLIPIDEMIA: ICD-10-CM

## 2021-03-19 DIAGNOSIS — I10 WHITE COAT SYNDROME WITH HYPERTENSION: ICD-10-CM

## 2021-03-19 DIAGNOSIS — R07.9 CHEST PAIN, UNSPECIFIED TYPE: Primary | ICD-10-CM

## 2021-03-19 DIAGNOSIS — R94.31 ABNORMAL EKG: ICD-10-CM

## 2021-03-19 PROCEDURE — 99244 OFF/OP CNSLTJ NEW/EST MOD 40: CPT | Performed by: SPECIALIST

## 2021-03-19 RX ORDER — CHROMIUM PICOLINATE 200 MCG
TABLET ORAL DAILY
COMMUNITY

## 2021-03-19 NOTE — PROGRESS NOTES
HISTORY OF PRESENT ILLNESS  Mat Hartley is a 61 y.o. female. She is referred for evaluation of chest pain and hypertension as well as hyper cholesterolemia. She used to exercise a great deal but developed problems with her right knee due to a torn meniscus which eventually required surgery. She has gained about 30 pounds over the past year. She takes her blood pressure at home and it is usually 559 systolic but goes up in doctors offices. She does not want to take any medication for blood pressure or for cholesterol. Her blood pressure taken by me today is 160/90. She does not smoke cigarettes or drink alcohol. She had an episode of chest pain 1 month ago which seem to be worse with taking a deep breath. It went through to her back and was associated with shortness of breath with exertion, which has now resolved. Recent blood work showed a total cholesterol of 235 up from 196 last year. Her LDL cholesterol was 144 up from 107. She has occasional palpitations. She never had hypertension before this past year. Her father had a stent.   HPI  Patient Active Problem List   Diagnosis Code    GERD (gastroesophageal reflux disease) K21.9    Achilles tendonitis M76.60    Anxiety F41.9    Scoliosis M41.9    Hip pain M25.559    H/O Abnormal Pap Smears ,      Congenital hip dysplasia Q65.89    S/P Surgery for Right Lateral Epicondylitis,  [id number]    S/P LASIK surgery Z98.890     (normal spontaneous vaginal delivery) O80    Abnormal Pap smear s/p Cryotherapy ~  [id number]    History of low back pain, DDD, mild scoliosis and mild-moderate disc protrusions Z87.39    Mild-moderate lumbar disc protrusions L4-5, L5-S1, mild scoliosis M51.26    Vitamin D deficiency E55.9    Vertigo R42    Right carpal tunnel syndrome G56.01    Screening exams for skin cancer Z12.83    Tear of medial meniscus of left knee S83.242A    White coat syndrome with hypertension I10    Diabetes mellitus type 2, noninsulin dependent (HCC) E11.9    Mixed hyperlipidemia E78.2    Right-sided low back pain with right-sided sciatica M54.41    Chest pain at rest R07.9    Abnormal EKG R94.31     Current Outpatient Medications   Medication Sig Dispense Refill    ashwagandha root extract 300 mg cap Take  by mouth two (2) times a day. For anxiety      aspirin delayed-release 81 mg tablet Take 81 mg by mouth daily. Takes ~ 3 x a week      cholecalciferol, vitamin D3, (VITAMIN D3 PO) Take 3,000 Units by mouth daily.       polyethylene glycol (MIRALAX) 17 gram/dose powder MIX AND DRINK 17G BY MOUTH EVERY DAY*NO SUB PERRIGO* 510 g prn     Past Medical History:   Diagnosis Date    Abnormal Pap smear s/p Cryotherapy ~ 2010    Achilles tendonitis     Bilateral; R>L, chronic, refractory    Anxiety     Mild; treated briefly    Benign essential hypertension 2016    Borderline hypertension 2016    Congenital hip dysplasia 1969    Reconstruction w/ bone grafting    Diabetes mellitus type 2, noninsulin dependent ([facility] 2016    GERD (gastroesophageal reflux disease)     UGI    Hip pain 2007    chronic R Hip pain    History of low back pain, DDD, mild scoliosis and mild-moderate disc protrusions 2012    Meniscus tear 2016    right knee    Mild-moderate lumbar disc protrusions L4-5, L5-S1, mild scoliosis 2012    Mixed hyperlipidemia 3/14/2018    Mononucleosis 2009     (normal spontaneous vaginal delivery)     A2,  x2, SAB x2    Pap smear abnormality ,     Cryotx     Right carpal tunnel syndrome 2015    ~ 2013: EMG study +    Right-sided low back pain with right-sided sciatica 2019    [facility] [place] Dr. Rosie Lowery, PT summer 2019; Pain Mgt, Dr. Arriaza Minion Injections 10/2019 & 2019; MRI    Scoliosis 2007    Screening exams for skin cancer 2016    Derm Dr Toni Silva Strep throat 2009    Tendon rupture 1997 R 3rd digit tendon rupture and repair    Vitamin D deficiency 5/18/2012     Past Surgical History:   Procedure Laterality Date    HAND/FINGER SURGERY UNLISTED  1997    right 3rd finger tendon rupture repair    HX COLONOSCOPY  04/19/2016    Polyp: tubular adenoma, repeat 3 yrs, Dr Theresa Ellsworth HX COLONOSCOPY  07/2019    2 polyps, Dr. Uma Guzmán, Repeat 3 yrs    HX KNEE ARTHROSCOPY Left 12/13/2016    related to torn meniscus    HX MENISCUS REPAIR Right 01/31/2020    Dr. Rashid Kimble HX PARTIAL HYSTERECTOMY  04/11/2013    Cervix intact; Dr Lilibeth Clarke    congenital hip dysplasia, left hip reconstruction and bone grafting    [place] CRYOCAUTERY OF CERVIX  ~11/2008    Dr Pete Andrade  10/09    R Achilles; Dr. Juan Mott  2005    right lateral epicondylitis surgery; TOA       Review of Systems   Respiratory: Positive for shortness of breath. Cardiovascular: Positive for chest pain and palpitations. All other systems reviewed and are negative. Visit Vitals  BP (!) 150/110 (BP 1 Location: Left upper arm, BP Patient Position: Sitting, BP Cuff Size: Adult)   Pulse 81   Resp 18   Ht 5' 9\" (1.753 m)   Wt 244 lb (110.7 kg)   LMP 03/16/2013   SpO2 98%   BMI 36.03 kg/m²       Physical Exam   Constitutional: She is oriented to person, place, and time. She appears well-nourished. HENT:   Head: Atraumatic. Eyes: Conjunctivae are normal.   Neck: Neck supple. Cardiovascular: Normal rate, regular rhythm and normal heart sounds. Exam reveals no gallop and no friction rub. No murmur heard. Pulmonary/Chest: Breath sounds normal. She has no wheezes. She has no rales. Abdominal: Bowel sounds are normal.   Musculoskeletal:         General: No edema. Neurological: She is oriented to person, place, and time. Skin: Skin is dry.    Psychiatric: Her behavior is normal.   Nursing note and vitals reviewed. ASSESSMENT and PLAN  She does seem to have quite significant whitecoat hypertension. She also has significant hyper cholesterolemia. However both of these issues are much worse over the past year with her weight gain. Her EKG is slightly abnormal in that it shows low voltage. For now I will have her return for stress echocardiogram and see her afterwards in the office to discuss results. If it is normal then I might suggest that she have a calcium score to look for premature coronary disease.

## 2021-04-21 ENCOUNTER — ANCILLARY PROCEDURE (OUTPATIENT)
Dept: CARDIOLOGY CLINIC | Age: 56
End: 2021-04-21
Payer: COMMERCIAL

## 2021-04-21 ENCOUNTER — OFFICE VISIT (OUTPATIENT)
Dept: CARDIOLOGY CLINIC | Age: 56
End: 2021-04-21

## 2021-04-21 VITALS
SYSTOLIC BLOOD PRESSURE: 124 MMHG | OXYGEN SATURATION: 98 % | BODY MASS INDEX: 35.13 KG/M2 | DIASTOLIC BLOOD PRESSURE: 90 MMHG | HEART RATE: 77 BPM | HEIGHT: 69 IN | WEIGHT: 237.2 LBS

## 2021-04-21 DIAGNOSIS — I10 WHITE COAT SYNDROME WITH HYPERTENSION: ICD-10-CM

## 2021-04-21 DIAGNOSIS — R94.31 ABNORMAL EKG: ICD-10-CM

## 2021-04-21 DIAGNOSIS — R07.9 CHEST PAIN, UNSPECIFIED TYPE: Primary | ICD-10-CM

## 2021-04-21 DIAGNOSIS — E78.2 MIXED HYPERLIPIDEMIA: ICD-10-CM

## 2021-04-21 LAB
ECHO AO ASC DIAM: 2.59 CM
ECHO AO ROOT DIAM: 3.02 CM
STRESS ANGINA INDEX: 0
STRESS BASELINE DIAS BP: 90 MMHG
STRESS BASELINE HR: 58 BPM
STRESS BASELINE SYS BP: 124 MMHG
STRESS ESTIMATED WORKLOAD: 9.9 METS
STRESS EXERCISE DUR MIN: NORMAL
STRESS PEAK DIAS BP: 80 MMHG
STRESS PEAK SYS BP: 164 MMHG
STRESS PERCENT HR ACHIEVED: 98 %
STRESS POST PEAK HR: 160 BPM
STRESS RATE PRESSURE PRODUCT: NORMAL BPM*MMHG
STRESS TARGET HR: 163 BPM

## 2021-04-21 PROCEDURE — 99214 OFFICE O/P EST MOD 30 MIN: CPT | Performed by: SPECIALIST

## 2021-04-21 PROCEDURE — 93351 STRESS TTE COMPLETE: CPT | Performed by: SPECIALIST

## 2021-04-21 NOTE — PROGRESS NOTES
HISTORY OF PRESENT ILLNESS  Michelle Pride is a 61 y.o. female. She was seen previously for chest pain and hypertension felt to be due to whitecoat hypertension. She returns today for stress echocardiogram that was normal.  She has lost 7 pounds since she was first seen. I reviewed her blood pressures taken at home and they were always less than 091 systolic. Her cholesterol had gone up recently but most likely has improved with weight loss. The pain she was having in her chest along with shortness of breath and coughing have now gone. She tested negative for the coronavirus. HPI  Patient Active Problem List   Diagnosis Code    GERD (gastroesophageal reflux disease) K21.9    Achilles tendonitis M76.60    Anxiety F41.9    Scoliosis M41.9    Hip pain M25.559    H/O Abnormal Pap Smears ,      Congenital hip dysplasia Q65.89    S/P Surgery for Right Lateral Epicondylitis,  [id number]    S/P LASIK surgery Z98.890     (normal spontaneous vaginal delivery) O80    Abnormal Pap smear s/p Cryotherapy ~  [id number]    History of low back pain, DDD, mild scoliosis and mild-moderate disc protrusions Z87.39    Mild-moderate lumbar disc protrusions L4-5, L5-S1, mild scoliosis M51.26    Vitamin D deficiency E55.9    Vertigo R42    Right carpal tunnel syndrome G56.01    Screening exams for skin cancer Z12.83    Tear of medial meniscus of left knee S83.242A    White coat syndrome with hypertension I10    Diabetes mellitus type 2, noninsulin dependent (HCC) E11.9    Mixed hyperlipidemia E78.2    Right-sided low back pain with right-sided sciatica M54.41    Chest pain at rest R07.9    Abnormal EKG R94.31     Current Outpatient Medications   Medication Sig Dispense Refill    ashwagandha root extract 300 mg cap Take  by mouth two (2) times a day. For anxiety      aspirin delayed-release 81 mg tablet Take 81 mg by mouth daily.  Takes ~ 3 x a week      cholecalciferol, vitamin D3, (VITAMIN D3 PO) Take 3,000 Units by mouth daily.       polyethylene glycol (MIRALAX) 17 gram/dose powder MIX AND DRINK 17G BY MOUTH EVERY DAY*NO SUB PERRIGO* 510 g prn     Past Medical History:   Diagnosis Date    Abnormal Pap smear s/p Cryotherapy ~ 2010    Achilles tendonitis     Bilateral; R>L, chronic, refractory    Anxiety     Mild; treated briefly    Benign essential hypertension 2016    Borderline hypertension 2016    Congenital hip dysplasia 1969    Reconstruction w/ bone grafting    Diabetes mellitus type 2, noninsulin dependent ([facility] [address] 2016    GERD (gastroesophageal reflux disease)     UGI    Hip pain 2007    chronic R Hip pain    History of low back pain, DDD, mild scoliosis and mild-moderate disc protrusions 2012    Meniscus tear 2016    right knee    Mild-moderate lumbar disc protrusions L4-5, L5-S1, mild scoliosis 2012    Mixed hyperlipidemia 3/14/2018    Mononucleosis 2009     (normal spontaneous vaginal delivery)     A2,  x2, SAB x2    Pap smear abnormality ,     Cryotx     Right carpal tunnel syndrome 2015    ~ 2013: EMG study +    Right-sided low back pain with right-sided sciatica 2019    [facility]th[facility] Dr. Tammie Rice, PT summer ; Pain Mgt, Dr. Fletcher Rice Injections 10/2019 & 2019; MRI    Scoliosis 2007    Screening exams for skin cancer 2016    Derm Dr Kendal Gibson Strep throat 2009    Tendon rupture 1997    R 3rd digit tendon rupture and repair    Vitamin D deficiency 2012     Past Surgical History:   Procedure Laterality Date    HAND/FINGER SURGERY UNLISTED      right 3rd finger tendon rupture repair    HX COLONOSCOPY  2016    Polyp: tubular adenoma, repeat 3 yrs, Dr Vargas Axon HX COLONOSCOPY  2019    2 polyps, Dr. Juancho Adhikari, Repeat 3 yrs    HX KNEE ARTHROSCOPY Left 2016    related to torn meniscus    HX MENISCUS REPAIR Right 2020     Dorothy    HX PARTIAL HYSTERECTOMY  04/11/2013    Cervix intact; Dr Kirt Diallo    congenital hip dysplasia, left hip reconstruction and bone grafting    CT CRYOCAUTERY OF CERVIX  ~11/2008    Dr Emerald Latham  10/09    R Achilles; Dr. Cassidy Webb  2005    right lateral epicondylitis surgery; TOA       Review of Systems   Cardiovascular: Positive for chest pain. All other systems reviewed and are negative. Visit Vitals  BP (!) 124/90   Pulse 77   Ht 5' 9\" (1.753 m)   Wt 237 lb 3.2 oz (107.6 kg)   LMP 03/16/2013   SpO2 98%   BMI 35.03 kg/m²       Physical Exam   Constitutional: She is oriented to person, place, and time. She appears well-nourished. HENT:   Head: Atraumatic. Eyes: Conjunctivae are normal.   Neck: Neck supple. Cardiovascular: Normal rate, regular rhythm and normal heart sounds. Exam reveals no gallop and no friction rub. No murmur heard. Pulmonary/Chest: Breath sounds normal. She has no wheezes. She has no rales. Abdominal: Bowel sounds are normal.   Musculoskeletal:         General: No edema. Neurological: She is oriented to person, place, and time. No cranial nerve deficit. Skin: Skin is dry. Psychiatric: Her behavior is normal.   Nursing note and vitals reviewed. ASSESSMENT and PLAN  She is doing much better and she does seem to have whitecoat hypertension. Her lipids can be followed by her primary care physician. I will see her back in the future on an as-needed basis.

## 2021-07-01 ENCOUNTER — TRANSCRIBE ORDER (OUTPATIENT)
Dept: SCHEDULING | Age: 56
End: 2021-07-01

## 2021-07-01 DIAGNOSIS — M54.12 BRACHIAL NEURITIS: Primary | ICD-10-CM

## 2021-09-29 ENCOUNTER — OFFICE VISIT (OUTPATIENT)
Dept: URGENT CARE | Age: 56
End: 2021-09-29
Payer: COMMERCIAL

## 2021-09-29 VITALS — TEMPERATURE: 98.4 F | RESPIRATION RATE: 20 BRPM | OXYGEN SATURATION: 99 % | HEART RATE: 61 BPM

## 2021-09-29 DIAGNOSIS — F41.8 ANXIETY ABOUT HEALTH: ICD-10-CM

## 2021-09-29 DIAGNOSIS — R05.9 COUGH: Primary | ICD-10-CM

## 2021-09-29 DIAGNOSIS — R07.89 CHEST TIGHTNESS: ICD-10-CM

## 2021-09-29 PROCEDURE — 99213 OFFICE O/P EST LOW 20 MIN: CPT | Performed by: FAMILY MEDICINE

## 2021-09-29 NOTE — PROGRESS NOTES
This patient was seen at [address] [place] [address] Urgent Care while in their vehicle due to COVID-19 pandemic with PPE and focused examination in order to decrease community viral transmission. The patient/guardian gave verbal consent to treat. Elfego Conrad is a 61 y.o. female who presents for COVID-19 testing. Reports new onset chest tightness, fatigue, cough, headache starting today. Reports similar symptoms off and on for six weeks, had \"cardiac workup\" and consult of cardiologist about 7 months ago for similar symptoms without any significant finding. Goal to complete negative COVID test today so she can follow up with PCP and cardiologist.  Denies any symptoms such as congestion, ST, n/v/d, fever etc. No known exposure to [place] or sick contacts. No other complaints or concerns at this time. PMH: HTN, GERD. Non-smoker.              Past Medical History:   Diagnosis Date    Abnormal Pap smear s/p Cryotherapy ~ 2010    Achilles tendonitis     Bilateral; R>L, chronic, refractory    Anxiety     Mild; treated briefly    Benign essential hypertension 2016    Borderline hypertension 2016    Congenital hip dysplasia 1969    Reconstruction w/ bone grafting    Diabetes mellitus type 2, noninsulin dependent ([facility] [address] 2016    GERD (gastroesophageal reflux disease)     UGI    Hip pain 2007    chronic R Hip pain    History of low back pain, DDD, mild scoliosis and mild-moderate disc protrusions 2012    Meniscus tear 2016    right knee    Mild-moderate lumbar disc protrusions L4-5, L5-S1, mild scoliosis 2012    Mixed hyperlipidemia 3/14/2018    Mononucleosis 2009     (normal spontaneous vaginal delivery)     A2,  x2, SAB x2    Pap smear abnormality ,     Cryotx     Right carpal tunnel syndrome 2015    ~ 2013: EMG study +    Right-sided low back pain with right-sided sciatica 2019    [facility] Juan[facility] Blanco, PT summer ; Pain Mgt, Dr. Beaver Cones Injections 10/2019 & 2019; MRI    Scoliosis 2007    Screening exams for skin cancer 2016    Derm Dr Pedro Ch Strep throat 2009    Tendon rupture 1997    R 3rd digit tendon rupture and repair    Vitamin D deficiency 2012        Past Surgical History:   Procedure Laterality Date    HAND/FINGER SURGERY UNLISTED      right 3rd finger tendon rupture repair    HX COLONOSCOPY  2016    Polyp: tubular adenoma, repeat 3 yrs, Dr Megan Stephenson HX COLONOSCOPY  2019    2 polyps, Dr. Chano Howard, Repeat 3 yrs    HX KNEE ARTHROSCOPY Left 2016    related to torn meniscus    HX MENISCUS REPAIR Right 2020    Dr. Meri Guzman    HX PARTIAL HYSTERECTOMY  2013    Cervix intact; Dr Amberly Varela    congenital hip dysplasia, left hip reconstruction and bone grafting    ID CRYOCAUTERY OF CERVIX  ~2008    Dr Brayan Falcon  10/09    R Achilles; Dr. Onofre Casiano      right lateral epicondylitis surgery; TOA         Family History   Problem Relation Age of Onset    Arrhythmia Mother     Bipolar Disorder Mother     Cancer Mother         cervical dx in her late 29's    High Cholesterol Mother     Heart Disease Mother         heart arrhytmia    Other Mother         brain tumor diagnosed at age 67, stable    Diabetes Father         obesity    Arthritis-osteo Father         ? RA as well    Cancer Father         skin cancer    Gout Father     Coronary Artery Disease Father         stent age 77 yo    Heart Disease Maternal Grandmother         CHF in her 63's    Heart Disease Maternal Grandfather          79yo    Hypertension Maternal Grandfather     Diabetes Paternal Grandmother     Stroke Paternal Grandmother          in her [de-identified]    Arthritis-rheumatoid Paternal Grandmother     Cancer Paternal Grandfather         liver and colon CA,  mid 66's    Diabetes Paternal Grandfather     Depression Sister     Anxiety Sister     Other Sister         scoliosis    Other Daughter         Tourettes, tremor, tics, milk/soy allergy, lactose intolerance    Other Daughter         Tourettes, anxiety    Breast Cancer Paternal Aunt         dx in her 29's    Diabetes Paternal Aunt         type 2    Diabetes Paternal Uncle         type 2    Arthritis-rheumatoid Paternal Aunt     Gout Paternal Aunt         Social History     Socioeconomic History    Marital status:      Spouse name: Not on file    Number of children: 2    Years of education: Not on file    Highest education level: Not on file   Occupational History    Occupation: Speech Pathologist for [employer]   Tobacco Use    Smoking status: Never Smoker    Smokeless tobacco: Never Used   Vaping Use    Vaping Use: Never used   Substance and Sexual Activity    Alcohol use: Not Currently     Comment: maybe 2-3 drinks of wine/cocktails per year    Drug use: No    Sexual activity: Yes     Partners: Male     Birth control/protection: Surgical     Comment: Hysterectomy 2013   Other Topics Concern     Service Not Asked    Blood Transfusions Not Asked    Caffeine Concern No     Comment: 1 cup of coffee a day maybe 4-5 days a week    Occupational Exposure Not Asked    Hobby Hazards Not Asked    Sleep Concern Not Asked    Stress Concern Not Asked    Weight Concern Yes     Comment: since eating healthier , wt is down ~35-40 lbs    Special Diet Yes     Comment: since : 1500 calorie diet, low carb (50 g/day), high protein    Back Care Yes     Comment: Ortho Dr. JONES [facility], Pain Mgt Dr Mando Barker Injections summer/2019, PT summer 2019    Exercise Yes     Comment: ellipitical 2 x a week x 1 hr, walks on weekends x 1 hr each time    Bike Helmet Not Asked    Seat Belt Not Asked    Self-Exams Not Asked   Social History Narrative    Native of [place]; [place] [facility] [place];  ~[place]; Got re- 4/2012    Recalls getting part of her Hepatitis B series in the 1990's, not sure if she got #3    Diet: Nothing special right now    Exercise: Walks 2 miles 2-3 x a week (low impact since  due to ortho issues)    Caffeine: 1 cup of coffee ~ 5 days a week    Weight: Has yo-yo'd between the 210's-230's x several years, but the past few years her weight has been creeping up         Social Determinants of Health     Financial Resource Strain:     Difficulty of Paying Living Expenses:    Food Insecurity:     Worried About Running Out of Food in the Last Year:     [address] in the Last Year:    Transportation Needs:     Lack of Transportation (Medical):  Lack of Transportation (Non-Medical):    Physical Activity:     Days of Exercise per Week:     Minutes of Exercise per Session:    Stress:     Feeling of Stress :    Social Connections:     Frequency of Communication with Friends and Family:     Frequency of Social Gatherings with Friends and Family:     Attends Taoist Services:     Active Member of Clubs or Organizations:     Attends Club or Organization Meetings:     Marital Status:    Intimate Partner Violence:     Fear of Current or Ex-Partner:     Emotionally Abused:     Physically Abused:     Sexually Abused: ALLERGIES: Prednisone, Bleach (sodium hypochlorite), and Metformin    Review of Systems   Constitutional: Positive for fatigue. Negative for activity change, appetite change, chills, diaphoresis and fever. HENT: Negative for congestion, ear pain, rhinorrhea, sinus pressure, sinus pain and sore throat. Respiratory: Positive for cough and chest tightness. Negative for shortness of breath and wheezing. Cardiovascular: Negative for chest pain. Gastrointestinal: Negative for abdominal pain, constipation, diarrhea, nausea and vomiting.    Musculoskeletal: Negative for myalgias. Skin: Negative for rash. Neurological: Positive for headaches. Negative for dizziness, weakness and light-headedness. Vitals:    09/29/21 1544 09/29/21 1558   Pulse: 61    Resp: 20    Temp: 98.4 °F (36.9 °C)    SpO2: 94% 99%       Physical Exam  Vitals and nursing note reviewed. Constitutional:       General: She is not in acute distress. Appearance: Normal appearance. She is not ill-appearing. HENT:      Head: Normocephalic and atraumatic. Mouth/Throat:      Mouth: Mucous membranes are moist.   Eyes:      Conjunctiva/sclera: Conjunctivae normal.      Pupils: Pupils are equal, round, and reactive to light. Cardiovascular:      Rate and Rhythm: Normal rate and regular rhythm. Heart sounds: Normal heart sounds. No murmur heard. Pulmonary:      Effort: Pulmonary effort is normal. No respiratory distress. Breath sounds: Normal breath sounds. No stridor. No wheezing or rhonchi. Chest:      Chest wall: No tenderness. Musculoskeletal:         General: Normal range of motion. Cervical back: Normal range of motion and neck supple. No muscular tenderness. Lymphadenopathy:      Cervical: No cervical adenopathy. Skin:     General: Skin is warm and dry. Findings: No rash. Neurological:      Mental Status: She is alert and oriented to person, place, and time. Psychiatric:         Mood and Affect: Mood normal.         Behavior: Behavior normal.         MDM    Procedures      ICD-10-CM ICD-9-CM   1. Cough  R05 786.2   2. Chest tightness  R07.89 786.59   3.  Anxiety about health  F41.8 300.09       Orders Placed This Encounter    NOVEL CORONAVIRUS (COVID-19) ([facility] Default)     Scheduling Instructions:      1) Due to current limited availability of the COVID-19 PCR test, tests will be prioritized and may not be completed.              2) Order only if the test result will change clinical management or necessary for a return to mission-critical employment decision.              3) Print and instruct patient to adhere to CDC home isolation program. (Link Above)              4) Set up or refer patient for a monitoring program.              5) Have patient sign up for and leverage MyChart (if not previously done). Order Specific Question:   Is this test for diagnosis or screening? Answer:   Diagnosis of ill patient     Order Specific Question:   Symptomatic for COVID-19 as defined by CDC? Answer:   Yes     Order Specific Question:   Date of Symptom Onset     Answer:   9/26/2021     Order Specific Question:   Hospitalized for COVID-19? Answer:   No     Order Specific Question:   Admitted to ICU for COVID-19? Answer:   No     Order Specific Question:   Employed in healthcare setting? Answer:   Unknown     Order Specific Question:   Resident in a congregate (group) care setting? Answer:   Unknown     Order Specific Question:   Pregnant? Answer:   No     Order Specific Question:   Previously tested for COVID-19? Answer:   Yes      Quarantine recommendations reviewed per CDC guidelines. Encouraged deep breathing exercises, ambulation, Tylenol prn- should symptoms develop. Increase fluids with electrolytes    The patient is to follow up with PCP PRN. If signs and symptoms, especially regarding chest discomfort, become worse the pt is to go directly to the ER. Patient verbalizes understanding.      Signed By: Alistair Singh NP     September 29, 2021

## 2021-10-01 LAB
SARS-COV-2, NAA 2 DAY TAT: NORMAL
SARS-COV-2, NAA: NOT DETECTED

## 2021-10-11 ENCOUNTER — TELEPHONE (OUTPATIENT)
Dept: FAMILY MEDICINE CLINIC | Age: 56
End: 2021-10-11

## 2021-10-11 NOTE — TELEPHONE ENCOUNTER
Called patient back Name and  verified. Patient c/o having pain in the back of the throat since Mid August, SOB when walking sometimes, dry cough. Patient says she's had 4 covid test to rule out Covid 19 all of them came back negative. I informed patient we do not have any openings for her to be seen today. Patient is advised to go an urgent care. Next available appointment made for her on 10/14/21. Patient agreed and verbalized understanding.

## 2021-10-11 NOTE — TELEPHONE ENCOUNTER
Pt called and said she has tightness in her chest and SOB that comes and goes. Pain in her throat and dry cough. Nurse Austin Shannon notified.      BCB# [phone]

## 2021-10-14 ENCOUNTER — OFFICE VISIT (OUTPATIENT)
Dept: FAMILY MEDICINE CLINIC | Age: 56
End: 2021-10-14
Payer: COMMERCIAL

## 2021-10-14 VITALS
HEART RATE: 71 BPM | BODY MASS INDEX: 36.61 KG/M2 | RESPIRATION RATE: 16 BRPM | SYSTOLIC BLOOD PRESSURE: 136 MMHG | OXYGEN SATURATION: 98 % | HEIGHT: 69 IN | TEMPERATURE: 98.6 F | WEIGHT: 247.2 LBS | DIASTOLIC BLOOD PRESSURE: 84 MMHG

## 2021-10-14 DIAGNOSIS — R10.13 DYSPEPSIA: ICD-10-CM

## 2021-10-14 DIAGNOSIS — R12 HEARTBURN: ICD-10-CM

## 2021-10-14 DIAGNOSIS — K21.9 GASTROESOPHAGEAL REFLUX DISEASE, UNSPECIFIED WHETHER ESOPHAGITIS PRESENT: Primary | ICD-10-CM

## 2021-10-14 PROCEDURE — 99214 OFFICE O/P EST MOD 30 MIN: CPT | Performed by: FAMILY MEDICINE

## 2021-10-14 RX ORDER — OMEPRAZOLE 40 MG/1
40 CAPSULE, DELAYED RELEASE ORAL
Qty: 90 CAPSULE | Refills: 0 | Status: SHIPPED | OUTPATIENT
Start: 2021-10-14 | End: 2022-01-09

## 2021-10-14 NOTE — PROGRESS NOTES
Chief Complaint   Patient presents with    Sore Throat     throat pain in the very back of throat since mid August all the time    Epigastric Pain     burning up from stomach mid in August 1. \"Have you been to the ER, urgent care clinic since your last visit? Hospitalized since your last visit? \" Yes Where: Urgent [address]    2. \"Have you seen or consulted any other health care providers outside of the [address] [place] [address] since your last visit? \"  Yes Dr Dc Martin    3. For patients over 45: Has the patient had a colonoscopy? In system    If the patient is female:    4. For patients over 40: Has the patient had a mammogram? Due last done in 2019    5. For patients over 21: Has the patient had a pap smear?  Gyn Dr Kimberly Wong she is due and will be scheduling     Due eye exam

## 2021-10-14 NOTE — PATIENT INSTRUCTIONS
Gastroesophageal Reflux Disease (GERD): Care Instructions  Overview     Gastroesophageal reflux disease (GERD) is the backward flow of stomach acid into the esophagus. The esophagus is the tube that leads from your throat to your stomach. A one-way valve prevents the stomach acid from backing up into this tube. But when you have GERD, this valve does not close tightly enough. This can also cause pain and swelling in your esophagus. (This is called esophagitis.)  If you have mild GERD symptoms including heartburn, you may be able to control the problem with antacids or over-the-counter medicine. You can also make lifestyle changes to help reduce your symptoms. These include changing your diet and eating habits, such as not eating late at night and losing weight. Follow-up care is a key part of your treatment and safety. Be sure to make and go to all appointments, and call your doctor if you are having problems. It's also a good idea to know your test results and keep a list of the medicines you take. How can you care for yourself at home? · Take your medicines exactly as prescribed. Call your doctor if you think you are having a problem with your medicine. · Your doctor may recommend over-the-counter medicine. For mild or occasional indigestion, antacids, such as Tums, Gaviscon, Mylanta, or Maalox, may help. Your doctor also may recommend over-the-counter acid reducers, such as famotidine (Pepcid AC), cimetidine (Tagamet HB), or omeprazole (Prilosec). Read and follow all instructions on the label. If you use these medicines often, talk with your doctor. · Change your eating habits. ? It's best to eat several small meals instead of two or three large meals. ? After you eat, wait 2 to 3 hours before you lie down. ? Chocolate, mint, and alcohol can make GERD worse. ? Spicy foods, foods that have a lot of acid (like tomatoes and oranges), and coffee can make GERD symptoms worse in some people.  If your symptoms are worse after you eat a certain food, you may want to stop eating that food to see if your symptoms get better. · Do not smoke or chew tobacco. Smoking can make GERD worse. If you need help quitting, talk to your doctor about stop-smoking programs and medicines. These can increase your chances of quitting for good. · If you have GERD symptoms at night, raise the head of your bed 6 to 8 inches by putting the frame on blocks or placing a foam wedge under the head of your mattress. (Adding extra pillows does not work.)  · Do not wear tight clothing around your middle. · Lose weight if you need to. Losing just 5 to 10 pounds can help. When should you call for help? Call your doctor now or seek immediate medical care if:    · You have new or different belly pain.     · Your stools are black and tarlike or have streaks of blood. Watch closely for changes in your health, and be sure to contact your doctor if:    · Your symptoms have not improved after 2 days.     · Food seems to catch in your throat or chest.   Where can you learn more? Go to http://www.gray.com/  Enter I144 in the search box to learn more about \"Gastroesophageal Reflux Disease (GERD): Care Instructions. \"  Current as of: February 10, 2021               Content Version: 13.0  © 2006-2021 [employer]. Care instructions adapted under license by [employer] (which disclaims liability or warranty for this information). If you have questions about a medical condition or this instruction, always ask your healthcare professional. [place] [address] any warranty or liability for your use of this information.

## 2021-10-14 NOTE — PROGRESS NOTES
Chief Complaint   Patient presents with    Sore Throat     throat pain in the very back of throat since mid August     Epigastric Pain     burning in stomach and into chest on and off over the summer       [address] [facility] [employer] [employer]   HPI  Patient has had ~ 8 month h/o non-specific chest pain. Interim workup has included negative CXR, EKG, and cardiac evaluation per cardiology including negative stress echo. Cardiology deemed this as non cardiac related CP and released her to follow up prn only after her last visit there 4-2021. She was advised to work on diet, exercise and weight reduction. Starting around mid August she has been experiencing intermittent burning substernal pains radiating into the back of her throat. She gained weight over the summer having strayed from diet/exercise. Around that time she began experiencing heartburn, acid reflux, indigestion, burning sensation in upper abdomen and intermittent waves of nausea w/o vomiting. This was intensified by certain foods. She took Tums 2-3 x a week for about a month and that gradually subsided. Since that time she has been trying to modify her diet a bit more. There are still certain dietary triggers which she now avoids. She is not taking anything OTC for symptom relief now. The past several weeks the burning pains in the back of her throat has gotten worse. Feels like burning in throat and chest and feels like her esophagus is \"spasming\". Sometimes feels like a tickling in the back of her throat which triggers an occ dry cough. Coughing aggravates her throat. She denies dysphagia, odynophagia, choking, hoarseness, vomiting, diarrhea, constipation, blood in stool or melena. No weight loss, has actually gained weight over summer which triggered symptoms this summer. Denies Nsaid usage but upon further review endorses having taken  mg 2-3 x a week x 6-8 months for nighttime leg cramps.  She stopped taking asa about 1-2 months ago.   [place] prior h/o similar symptoms back in ~ [id number]. Recalls having an UGI and being diagnosed w/ acid reflux. Cant recall ever having an upper endoscopy. Because of the throat pain she ended up going to urgent care last month to be ruled out for Covid. Her test was negative. REVIEW OF SYMPTOMS   Review of Systems   Constitutional: Negative. Negative for chills, fever and weight loss. Respiratory: Negative for sputum production, shortness of breath and wheezing. Cardiovascular: Negative for palpitations. Gastrointestinal: Positive for heartburn and nausea. Negative for blood in stool, constipation, diarrhea and melena. Genitourinary: Negative. Neurological: Negative.             PROBLEM LIST/MEDICAL HISTORY     Problem List  Date Reviewed: 10/14/2021        Codes Class Noted    Chest pain at rest ICD-10-CM: R07.9  ICD-9-CM: 786.50  3/19/2021        Abnormal EKG ICD-10-CM: R94.31  ICD-9-CM: 794.31  3/19/2021        Right-sided low back pain with right-sided sciatica ICD-10-CM: M54.41  ICD-9-CM: 724.3  12/4/2019    Overview Signed 12/4/2019  3:50 PM by MD Angi Vaughn Dr., PT summer 2019; Pain Mgt, Dr. Arben Cassidy Injections 10/2019 & 11/2019; MRI             Mixed hyperlipidemia ICD-10-CM: C22.3  ICD-9-CM: 272.2  3/14/2018        Diabetes mellitus type 2, noninsulin dependent ([facility] ICD-10-CM: E11.9  ICD-9-CM: 250.00  12/27/2016    Overview Signed 12/27/2016  2:21 PM by Fran Galarza MD     11-28-16             Tear of medial meniscus of left knee ICD-10-CM: A94.245Q  ICD-9-CM: 836.0  11/28/2016    Overview Signed 11/28/2016 10:42 AM by Fran Galarza MD     ~ Spring 2016, Ortho Dr Roney Marcelo, scope scheduled 12-13-16             White coat syndrome with hypertension ICD-10-CM: I10  ICD-9-CM: 401.9  11/28/2016        Screening exams for skin cancer ICD-10-CM: Z12.83  ICD-9-CM: V76.43  5/5/2016    Overview Signed 5/5/2016  2:38 PM by Adams Beckman MD     Derm Dr Quintana             Right carpal tunnel syndrome ICD-10-CM: G56.01  ICD-9-CM: 354.0  2015    Overview Addendum 2015 11:04 AM by Adams Beckman MD     ~ 2013 Dr Jose A Edmonds: EMG study + Impression:  The above electrodiagnostic study reveals evidence of mildly prolonged motor and sensory distal latency of right median nerve, suggesting mild median nerve entrapment at wrist.             Vertigo ICD-10-CM: R42  ICD-9-CM: 780.4  6/3/2013        Vitamin D deficiency ICD-10-CM: E55.9  ICD-9-CM: 268.9  2012    Overview Signed 2012  1:42 PM by Adams Beckman MD     2012             History of low back pain, DDD, mild scoliosis and mild-moderate disc protrusions ICD-10-CM: Z87.39  ICD-9-CM: V13.59  2012        Mild-moderate lumbar disc protrusions L4-5, L5-S1, mild scoliosis ICD-10-CM: M51.26  ICD-9-CM: 722.10  2012    Overview Signed 2012  3:01 PM by Adams Beckman MD     2007             Abnormal Pap smear s/p Cryotherapy ~  ICD-10-CM: [id number]  ICD-9-CM: YBC2332  2010    Overview Signed 2010  8:53 AM by Adams Beckman MD     GYN Dr Gulshan Lizarraga (normal spontaneous vaginal delivery) ICD-10-CM: O80  ICD-9-CM: 380  Unknown    Overview Signed 3/22/2010 10:24 AM by Adams Beckman MD     A2,  x2, SAB x2             Achilles tendonitis ICD-10-CM: M76.60  ICD-9-CM: 726.71  3/1/2010    Overview Signed 3/1/2010 11:14 AM by Brittney De La Garza     L>R s/p cortisone             Anxiety ICD-10-CM: F41.9  ICD-9-CM: 300.00  3/1/2010    Overview Signed 3/1/2010 11:14 AM by Brittney De La Garza     mild             H/O Abnormal Pap Smears 1998 ICD-9-CM: 796.9  3/1/2010    Overview Signed 3/1/2010 11:17 AM by Brittney De La Garza     5019'L and  (no Bx)             Congenital hip dysplasia ICD-10-CM: Q65.89  ICD-9-CM: 755.63  3/1/2010    Overview Signed 3/1/2010 11:18 AM by Juan Pablo, Lamon Pleunice     Left. Recontruction, bone grafting 1969             Scoliosis ICD-10-CM: M41.9  ICD-9-CM: 737.30  8/1/2007    Overview Signed 3/1/2010 11:15 AM by Augie Burnett     mild             Hip pain ICD-10-CM: M25.559  ICD-9-CM: 719.45  8/1/2007    Overview Signed 3/1/2010 11:15 AM by Augie Burnett     right             S/P Surgery for Right Lateral Epicondylitis, 2005 ICD-10-CM: [id number]  ICD-9-CM: 726.32  1/1/2005    Overview Addendum 5/8/2012  3:03 PM by Homa Comer MD     TOA             S/P LASIK surgery ICD-10-CM: N21.864  ICD-9-CM: V45.69  1/1/2005        GERD (gastroesophageal reflux disease) ICD-10-CM: K21.9  ICD-9-CM: 530.81  1/1/2003    Overview Addendum 5/8/2012  3:01 PM by Homa Comer MD     ([facility]) 2003                       PAST SURGICAL HISTORY     Past Surgical History:   Procedure Laterality Date    HAND/FINGER SURGERY UNLISTED  1997    right 3rd finger tendon rupture repair    HX COLONOSCOPY  04/19/2016    Polyp: tubular adenoma, repeat 3 yrs, Dr Ramirez Murphy HX COLONOSCOPY  07/2019    2 polyps, Dr. Shyann Martinez, Repeat 3 yrs    HX KNEE ARTHROSCOPY Left 12/13/2016    related to torn meniscus    HX MENISCUS REPAIR Right 01/31/2020    Dr. Margo [address]dez [address]  04/11/2013    Cervix intact; Dr Samantha Romberg    congenital hip dysplasia, left hip reconstruction and bone grafting    [place] CRYOCAUTERY OF CERVIX  ~11/2008    Dr Rick Mckeon  10/09    R Achilles; Dr. Carlos Laura  2005    right lateral epicondylitis surgery; TOA         MEDICATIONS     Current Outpatient Medications   Medication Sig    omeprazole (PRILOSEC) 40 mg capsule Take 1 Capsule by mouth Daily (before breakfast).  ashwagandha root extract 300 mg cap Take  by mouth daily.  For anxiety    cholecalciferol, vitamin D3, (VITAMIN D3 PO) Take 3,000 Units by mouth daily.  polyethylene glycol (MIRALAX) 17 gram/dose powder MIX AND DRINK 17G BY MOUTH EVERY DAY*NO SUB PERRIGO*     No current facility-administered medications for this visit.           ALLERGIES     Allergies   Allergen Reactions    Prednisone Shortness of Breath     SOB, flushed, red rash    Bleach (Sodium Hypochlorite) Other (comments)    Metformin Other (comments)     Severe depression and coldness in arms            SOCIAL HISTORY     Social History     Tobacco Use    Smoking status: Never Smoker    Smokeless tobacco: Never Used   Substance Use Topics    Alcohol use: Not Currently     Comment: maybe 2-3 drinks of wine/cocktails per year     Social History     Social History Narrative    Native of [place]; moved [place] [facility] to [place]     ~2006; Got re- 4/2012    Recalls getting part of her Hepatitis B series in the 1990's, not sure if she got #3    She works as a Speech Pathologist for [id number] [address]: Nothing special right now    Exercise: Walks 2 miles 2-3 x a week (low impact since  due to ortho issues)    Caffeine: 1 cup of coffee ~ 5 days a week, 1 cup of tea ~ 2 x a week    Weight: Has yo-yo'd between the 210's-230's x several years, but the past few years her weight has been creeping up and staying in the 230's-240's         Social History     Substance and Sexual Activity   Sexual Activity Yes    Partners: Male    Birth control/protection: Surgical    Comment: Hysterectomy 2013       IMMUNIZATIONS     Immunization History   Administered Date(s) Administered    COVID-19, PFIZER, MRNA, LNP-S, PF, 30MCG/0.3ML DOSE 04/12/2021, 05/03/2021    TD Vaccine 03/20/2009    Zoster Recombinant 01/04/2021, 03/08/2021         FAMILY HISTORY     Family History   Problem Relation Age of Onset    Arrhythmia Mother     Bipolar Disorder Mother     Cancer Mother         cervical dx in her late 29's    High Cholesterol Mother     Heart Disease Mother heart arrhytmia    Other Mother         brain tumor diagnosed at age 67, stable    Diabetes Father         obesity    Arthritis-osteo Father         ? RA as well    Cancer Father         skin cancer    Gout Father     Coronary Artery Disease Father         stent age 77 yo   Ranjan Loser Heart Disease Maternal Grandmother         CHF in her 63's    Heart Disease Maternal Grandfather          81yo    Hypertension Maternal Grandfather     Diabetes Paternal Grandmother     Stroke Paternal Grandmother          in her [de-identified]    Arthritis-rheumatoid Paternal Grandmother     Cancer Paternal Grandfather         liver and colon CA,  mid 66's    Diabetes Paternal Grandfather     Depression Sister     Anxiety Sister     Other Sister         scoliosis    Other Daughter         Tourettes, tremor, tics, milk/soy allergy, lactose intolerance    Other Daughter         Tourettes, anxiety    Breast Cancer Paternal Aunt         dx in her 29's    Diabetes Paternal Aunt         type 2    Diabetes Paternal Uncle         type 2    Arthritis-rheumatoid Paternal Aunt     Gout Paternal Aunt          VITALS     Visit Vitals  /84 (BP 1 Location: Left upper arm, BP Patient Position: Sitting, BP Cuff Size: Large adult)   Pulse 71   Temp 98.6 °F (37 °C) (Oral)   Resp 16   Ht 5' 9\" (1.753 m)   Wt 247 lb 3.2 oz (112.1 kg)   LMP 2013   SpO2 98%   BMI 36.51 kg/m²          PHYSICAL EXAMINATION   Physical Exam  Vitals reviewed. Constitutional:       General: She is not in acute distress. Appearance: Normal appearance. HENT:      Mouth/Throat:      Mouth: Mucous membranes are moist.      Pharynx: Oropharynx is clear. Eyes:      Conjunctiva/sclera: Conjunctivae normal.   Cardiovascular:      Rate and Rhythm: Normal rate and regular rhythm. Heart sounds: Normal heart sounds. Pulmonary:      Effort: Pulmonary effort is normal.      Breath sounds: Normal breath sounds.    Abdominal:      General: There is no distension. Palpations: Abdomen is soft. There is no mass. Tenderness: There is no abdominal tenderness. Musculoskeletal:      Cervical back: Neck supple. Lymphadenopathy:      Cervical: No cervical adenopathy. Skin:     General: Skin is warm and dry. Neurological:      Mental Status: She is alert. Psychiatric:         Mood and Affect: Mood normal.                LABORATORY DATA/ANCILLARY/IMAGING     Results for orders placed or performed in visit on 09/29/21   NOVEL CORONAVIRUS (COVID-19)   Result Value Ref Range    SARS-CoV-2, DARCY Not Detected Not Detected   SARS-COV-2, DARCY 2 DAY TAT   Result Value Ref Range    SARS-CoV-2, DARCY 2 DAY TAT Performed        Lab Results   Component Value Date/Time    WBC 7.0 01/04/2021 12:00 AM    HGB 15.6 01/04/2021 12:00 AM    HCT 47.3 (H) 01/04/2021 12:00 AM    PLATELET 931 19/29/9698 12:00 AM    MCV 86 01/04/2021 12:00 AM     Lab Results   Component Value Date/Time    TSH 1.410 01/04/2021 12:00 AM      Lab Results   Component Value Date/Time    Sodium 140 01/04/2021 12:00 AM    Potassium 4.8 01/04/2021 12:00 AM    Chloride 101 01/04/2021 12:00 AM    CO2 27 01/04/2021 12:00 AM    Anion gap 7 10/09/2009 08:33 AM    Glucose 102 (H) 01/04/2021 12:00 AM    BUN 13 01/04/2021 12:00 AM    Creatinine 0.81 01/04/2021 12:00 AM    BUN/Creatinine ratio 16 01/04/2021 12:00 AM    GFR est AA 94 01/04/2021 12:00 AM    GFR est non-AA 81 01/04/2021 12:00 AM    Calcium 10.2 01/04/2021 12:00 AM    Bilirubin, total 1.3 (H) 01/04/2021 12:00 AM    ALT (SGPT) 18 01/04/2021 12:00 AM    Alk. phosphatase 77 01/04/2021 12:00 AM    Protein, total 7.1 01/04/2021 12:00 AM    Albumin 4.6 01/04/2021 12:00 AM    Globulin 3.5 03/20/2009 10:23 AM    A-G Ratio 1.8 01/04/2021 12:00 AM          ASSESSMENT & PLAN     1. Gastroesophageal reflux disease, unspecified whether esophagitis present  -     REFERRAL TO GASTROENTEROLOGY  -     omeprazole (PRILOSEC) 40 mg capsule;  Take 1 Capsule by mouth Daily (before breakfast). 2. Heartburn  -     omeprazole (PRILOSEC) 40 mg capsule; Take 1 Capsule by mouth Daily (before breakfast). 3. Dyspepsia  -     omeprazole (PRILOSEC) 40 mg capsule; Take 1 Capsule by mouth Daily (before breakfast). Previous work up/management to date:  CXR 2-2021 normal  EKG 2-2021 negative  Cardiology consult and Stress Echo 4-2021 negative  Cardiology deemed this as non cardiac related CP, released her to follow up prn, and advised her to work on diet, exercise and weight reduction. [id number] GI evaluation w/ UGI and diagnosis of GERD    Plan:  Referred to GI for Upper Endoscopy  She has been seen in the past by Dr. Stephen Chandler and was encouraged to contact her office for an appointment for evaluation  Start trial of Prilosec 40 mg qam for now  Avoid dietary triggers, minimize caffeine consumption, and refrain from any additional ASA usage or taking Nsaids. Work on weight reduction  Follow up after GI evaluation.   Return sooner in the interim prn  GERD counseling, home care instructions, handouts given

## 2021-10-26 ENCOUNTER — OFFICE VISIT (OUTPATIENT)
Dept: URGENT CARE | Age: 56
End: 2021-10-26
Payer: COMMERCIAL

## 2021-10-26 VITALS — OXYGEN SATURATION: 96 % | HEART RATE: 66 BPM | TEMPERATURE: 98.4 F | RESPIRATION RATE: 15 BRPM

## 2021-10-26 DIAGNOSIS — Z11.59 SCREENING FOR VIRAL DISEASE: ICD-10-CM

## 2021-10-26 DIAGNOSIS — J02.9 SORE THROAT: Primary | ICD-10-CM

## 2021-10-26 DIAGNOSIS — R51.9 ACUTE NONINTRACTABLE HEADACHE, UNSPECIFIED HEADACHE TYPE: ICD-10-CM

## 2021-10-26 LAB
S PYO AG THROAT QL: NEGATIVE
SARS-COV-2 POC: NEGATIVE
VALID INTERNAL CONTROL?: YES

## 2021-10-26 PROCEDURE — 87426 SARSCOV CORONAVIRUS AG IA: CPT | Performed by: FAMILY MEDICINE

## 2021-10-26 PROCEDURE — 87880 STREP A ASSAY W/OPTIC: CPT | Performed by: FAMILY MEDICINE

## 2021-10-26 PROCEDURE — 99213 OFFICE O/P EST LOW 20 MIN: CPT | Performed by: FAMILY MEDICINE

## 2021-10-26 NOTE — PROGRESS NOTES
This patient was seen at [address] [place] [address] Urgent Care while in their vehicle due to COVID-19 pandemic with PPE and focused examination in order to decrease community viral transmission. The patient/guardian gave verbal consent to treat. Wilfrid Coppola is a 61 y.o. female who presents with ST since yesterday. No known COVID contacts. Denies cough, fever, SOB, N/V/D. The history is provided by the patient.         Past Medical History:   Diagnosis Date    Abnormal Pap smear s/p Cryotherapy ~ 2010    Achilles tendonitis     Bilateral; R>L, chronic, refractory    Anxiety     Mild; treated briefly    Benign essential hypertension 2016    Borderline hypertension 2016    Congenital hip dysplasia     Reconstruction w/ bone grafting    Diabetes mellitus type 2, noninsulin dependent ([facility] 2016    GERD (gastroesophageal reflux disease)     UGI    Hip pain 2007    chronic R Hip pain    History of low back pain, DDD, mild scoliosis and mild-moderate disc protrusions 2012    Meniscus tear 2016    right knee    Mild-moderate lumbar disc protrusions L4-5, L5-S1, mild scoliosis 2012    Mixed hyperlipidemia 3/14/2018    Mononucleosis 2009     (normal spontaneous vaginal delivery)     A2,  x2, SAB x2    Pap smear abnormality ,     Cryotx     Right carpal tunnel syndrome 2015    ~ 2013: EMG study +    Right-sided low back pain with right-sided sciatica 2019    [facility] Monterro[facility], PT summer 2019; Pain Mgt, Dr. Estevez  Injections 10/2019 & 2019; MRI    Scoliosis 2007    Screening exams for skin cancer 2016    Derm Dr Bah Pole Strep throat 2009    Tendon rupture 1997    R 3rd digit tendon rupture and repair    Vitamin D deficiency 2012        Past Surgical History:   Procedure Laterality Date    HAND/FINGER SURGERY UNLISTED      right 3rd finger tendon rupture repair    HX COLONOSCOPY  2016    Polyp: tubular adenoma, repeat 3 yrs, Dr Chau Mustafa HX COLONOSCOPY  2019    2 polyps, Dr. Cleary Reason, Repeat 3 yrs    HX KNEE ARTHROSCOPY Left 2016    related to torn meniscus    HX MENISCUS REPAIR Right 2020    Dr. Tuyet Andrew  2013    Cervix intact; Dr Bettye Smith    congenital hip dysplasia, left hip reconstruction and bone grafting    [facility] CRYOCAUTERY [facility] CERVIX  ~2008    Dr Emani Romero  10/09    R Achilles; Dr. Jason Conn  2005    right lateral epicondylitis surgery; TOA         Family History   Problem Relation Age of Onset    Arrhythmia Mother     Bipolar Disorder Mother     Cancer Mother         cervical dx in her late 29's    High Cholesterol Mother     Heart Disease Mother         heart arrhytmia    Other Mother         brain tumor diagnosed at age 67, stable    Diabetes Father         obesity    Arthritis-osteo Father         ? RA as well    Cancer Father         skin cancer    Gout Father     Coronary Artery Disease Father         stent age 75 yo    Heart Disease Maternal Grandmother         CHF in her 63's    Heart Disease Maternal Grandfather          81yo    Hypertension Maternal Grandfather     Diabetes Paternal Grandmother     Stroke Paternal Grandmother          in her [de-identified]    Arthritis-rheumatoid Paternal Grandmother     Cancer Paternal Grandfather         liver and colon CA,  mid 66's    Diabetes Paternal Grandfather     Depression Sister     Anxiety Sister     Other Sister         scoliosis    Other Daughter         Tourettes, tremor, tics, milk/soy allergy, lactose intolerance    Other Daughter         Tourettes, anxiety    Breast Cancer Paternal Aunt         dx in her 29's    Diabetes Paternal Aunt         type 2    Diabetes Paternal Uncle         type 2    Arthritis-rheumatoid Paternal Aunt     Gout Paternal Aunt         Social History     Socioeconomic History    Marital status:      Spouse name: Not on file    Number of children: 2    Years of education: Not on file    Highest education level: Not on file   Occupational History    Occupation: Speech Pathologist for [employer]   Tobacco Use    Smoking status: Never Smoker    Smokeless tobacco: Never Used   Vaping Use    Vaping Use: Never used   Substance and Sexual Activity    Alcohol use: Not Currently     Comment: maybe 2-3 drinks of wine/cocktails per year    Drug use: No    Sexual activity: Yes     Partners: Male     Birth control/protection: Surgical     Comment: Hysterectomy 2013   Other Topics Concern     Service Not Asked    Blood Transfusions Not Asked    Caffeine Concern No     Comment: 1 cup of coffee a day maybe 4-5 days a week    Occupational Exposure Not Asked    Hobby Hazards Not Asked    Sleep Concern Not Asked    Stress Concern Not Asked    Weight Concern Yes     Comment: since eating healthier 4-2019, wt is down ~35-40 lbs    Special Diet Yes     Comment: since 4-2019: 1500 calorie diet, low carb (50 g/day), high protein    Back Care Yes     Comment: Ortho Dr. 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    Exercise: Walks 2 miles 2-3 x a week (low impact since  due to ortho issues)    Caffeine: 1 cup of coffee ~ 5 days a week, 1 cup of tea ~ 2 x a week    Weight: Has yo-yo'd between the 210's-230's x several years, but the past few years her weight has been creeping up and staying in the 230's-240's         Social Determinants of Health     Financial Resource Strain:     Difficulty of Paying Living Expenses:    Food Insecurity:     Worried About Running Out of Food in the Last Year:     [address] in the Last Year:    Transportation Needs:     Lack of Transportation (Medical):  Lack of Transportation (Non-Medical):    Physical Activity:     Days of Exercise per Week:     Minutes of Exercise per Session:    Stress:     Feeling of Stress :    Social Connections:     Frequency of Communication with Friends and Family:     Frequency of Social Gatherings with Friends and Family:     Attends Sabianist Services:     Active Member of Clubs or Organizations:     Attends Club or Organization Meetings:     Marital Status:    Intimate Partner Violence:     Fear of Current or Ex-Partner:     Emotionally Abused:     Physically Abused:     Sexually Abused: ALLERGIES: Prednisone, Bleach (sodium hypochlorite), and Metformin    Review of Systems   Constitutional: Negative for activity change, appetite change and fever. HENT: Positive for sore throat. Respiratory: Negative for cough and shortness of breath. Gastrointestinal: Negative for diarrhea, nausea and vomiting. Vitals:    10/26/21 1146   Pulse: 66   Resp: 15   Temp: 98.4 °F (36.9 °C)   SpO2: 96%       Physical Exam  Vitals and nursing note reviewed. Constitutional:       General: She is not in acute distress. Appearance: She is well-developed. She is not diaphoretic. HENT:      Mouth/Throat:      Mouth: Mucous membranes are moist.      Pharynx: Oropharynx is clear. Posterior oropharyngeal erythema present. No oropharyngeal exudate. Pulmonary:      Effort: Pulmonary effort is normal. No respiratory distress.       Breath sounds: Normal breath sounds. No stridor. No wheezing, rhonchi or rales. Neurological:      Mental Status: She is alert. Psychiatric:         Behavior: Behavior normal.         Thought Content: Thought content normal.         Judgment: Judgment normal.         [facility]    ICD-10-CM ICD-9-CM   1. Sore throat  J02.9 462   2. Acute nonintractable headache, unspecified headache type  R51.9 784.0   3. Screening for viral disease  Z11.59 V73.99       Orders Placed This Encounter    NOVEL CORONAVIRUS (COVID-19)     Scheduling Instructions:      1) Due to current limited availability of the COVID-19 PCR test, tests will be prioritized and may not be completed.              2) Order only if the test result will change clinical management or necessary for a return to mission-critical employment decision.              3) Print and instruct patient to adhere to CDC home isolation program. (Link Above)              4) Set up or refer patient for a monitoring program.              5) Have patient sign up for and leverage MyChart (if not previously done). Order Specific Question:   Is this test for diagnosis or screening? Answer:   Diagnosis of ill patient     Order Specific Question:   Symptomatic for COVID-19 as defined by CDC? Answer:   Yes     Order Specific Question:   Date of Symptom Onset     Answer:   10/25/2021     Order Specific Question:   Hospitalized for COVID-19? Answer:   No     Order Specific Question:   Admitted to ICU for COVID-19? Answer:   No     Order Specific Question:   Employed in healthcare setting? Answer:   Unknown     Order Specific Question:   Resident in a congregate (group) care setting? Answer:   No     Order Specific Question:   Pregnant? Answer:   No     Order Specific Question:   Previously tested for COVID-19? Answer: Yes    AMB POC RAPID STREP A    AMB POC SARS-COV-2 ANTIGEN     Order Specific Question:   Is this test for diagnosis or screening?      Answer: Diagnosis of ill patient     Order Specific Question:   Symptomatic for COVID-19 as defined by CDC? Answer:   Yes     Order Specific Question:   Date of Symptom Onset     Answer:   10/25/2021     Order Specific Question:   Hospitalized for COVID-19? Answer:   No     Order Specific Question:   Admitted to ICU for COVID-19? Answer:   No     Order Specific Question:   Employed in healthcare setting? Answer:   Unknown     Order Specific Question:   Resident in a congregate (group) care setting? Answer:   No     Order Specific Question:   Pregnant? Answer:   No     Order Specific Question:   Previously tested for COVID-19? Answer: Yes        Await PCR results    Tylenol prn  Salt water gargles  MyChart: active  Provider will call if PCR COVID-19 test is positive     If signs and symptoms become worse the pt is to go to the ER.      Results for orders placed or performed in visit on 10/26/21   AMB POC RAPID STREP A   Result Value Ref Range    VALID INTERNAL CONTROL POC Yes     Group A Strep Ag Negative Negative   AMB POC SARS-COV-2   Result Value Ref Range    SARS-COV-2 POC Negative Negative       Procedures

## 2021-10-28 LAB
SARS-COV-2, NAA 2 DAY TAT: NORMAL
SARS-COV-2, NAA: NOT DETECTED

## 2022-01-09 DIAGNOSIS — R10.13 DYSPEPSIA: ICD-10-CM

## 2022-01-09 DIAGNOSIS — R12 HEARTBURN: ICD-10-CM

## 2022-01-09 DIAGNOSIS — K21.9 GASTROESOPHAGEAL REFLUX DISEASE, UNSPECIFIED WHETHER ESOPHAGITIS PRESENT: ICD-10-CM

## 2022-01-09 RX ORDER — OMEPRAZOLE 40 MG/1
CAPSULE, DELAYED RELEASE ORAL
Qty: 90 CAPSULE | Refills: 0 | Status: SHIPPED | OUTPATIENT
Start: 2022-01-09 | End: 2022-01-14 | Stop reason: ALTCHOICE

## 2022-01-09 NOTE — TELEPHONE ENCOUNTER
I started patient on this back in . But I also referred her to GI because I wanted her to schedule an upper endoscopy. Did she see them and have that done? I went ahead and sent in rx refill for now. Please request reports if she did have done and put on my desk. Thanks!

## 2022-01-14 NOTE — TELEPHONE ENCOUNTER
Spoke to pt and she states that she isn't taking the omeprazole. She did see GI Dr Arron Mondragon.  She had the endoscopy. Dr Arron Mondragon said it showed she had reflux but other than that it was normal.  I have faxed over request for report and LOV note. Pt will call the pharmacy to cancel the prescription.

## 2022-01-20 ENCOUNTER — OFFICE VISIT (OUTPATIENT)
Dept: URGENT CARE | Age: 57
End: 2022-01-20
Payer: COMMERCIAL

## 2022-01-20 VITALS — TEMPERATURE: 98.6 F | HEART RATE: 85 BPM | OXYGEN SATURATION: 96 % | RESPIRATION RATE: 16 BRPM

## 2022-01-20 DIAGNOSIS — J02.9 SORE THROAT: ICD-10-CM

## 2022-01-20 DIAGNOSIS — Z20.822 EXPOSURE TO COVID-19 VIRUS: Primary | ICD-10-CM

## 2022-01-20 PROCEDURE — 99212 OFFICE O/P EST SF 10 MIN: CPT | Performed by: FAMILY MEDICINE

## 2022-01-20 NOTE — PROGRESS NOTES
This patient was seen at [address] [place] [address] Urgent Care while in their vehicle due to COVID-19 pandemic with PPE and focused examination in order to decrease community viral transmission. The patient/guardian gave verbal consent to treat. Ramos William is a 61 y.o. female who presents with slight ST since this AM. Was exposed to  who tested positive to COVID-19. Denies cough, fever, SOB, N/V/D. The history is provided by the patient.         Past Medical History:   Diagnosis Date    Abnormal Pap smear s/p Cryotherapy ~ 2010    Achilles tendonitis     Bilateral; R>L, chronic, refractory    Anxiety     Mild; treated briefly    Benign essential hypertension 2016    Borderline hypertension 2016    Congenital hip dysplasia     Reconstruction w/ bone grafting    Diabetes mellitus type 2, noninsulin dependent ([facility] 2016    GERD (gastroesophageal reflux disease)     UGI    Hip pain 2007    chronic R Hip pain    History of low back pain, DDD, mild scoliosis and mild-moderate disc protrusions 2012    Meniscus tear 2016    right knee    Mild-moderate lumbar disc protrusions L4-5, L5-S1, mild scoliosis 2012    Mixed hyperlipidemia 3/14/2018    Mononucleosis 2009     (normal spontaneous vaginal delivery)     A2,  x2, SAB x2    Pap smear abnormality ,     Cryotx     Right carpal tunnel syndrome 2015    ~ 2013: EMG study +    Right-sided low back pain with right-sided sciatica 2019    [facility], [facility] 2019; Pain Mgt, Dr. Khirs Ash Injections 10/2019 & 2019; MRI    Scoliosis 2007    Screening exams for skin cancer 2016    Derm Dr Edilson Palencia Strep throat 2009    Tendon rupture 1997    R 3rd digit tendon rupture and repair    Vitamin D deficiency 2012        Past Surgical History:   Procedure Laterality Date    HAND/FINGER SURGERY UNLISTED     right 3rd finger tendon rupture repair    HX COLONOSCOPY  2016    Polyp: tubular adenoma, repeat 3 yrs, Dr Vicki Fitzgerald HX COLONOSCOPY  2019    2 polyps, Dr. Yelena Renee, Repeat 3 yrs    HX ENDOSCOPY      GERD only, Dr. Rodrigues Fresh ARTHROSCOPY Left 2016    related to torn meniscus    HX MENISCUS REPAIR Right 2020    Dr. [address]  2013    Cervix intact; Dr Jennifer Ventura    congenital hip dysplasia, left hip reconstruction and bone grafting    [facility] CRYOCAUTERY OF CERVIX  ~2008    Dr Jose Juan Perez  10/09    R Achilles; Dr. Netta Reza      right lateral epicondylitis surgery; TOA         Family History   Problem Relation Age of Onset    Arrhythmia Mother     Bipolar Disorder Mother     Cancer Mother         cervical dx in her late 29's    High Cholesterol Mother     Heart Disease Mother         heart arrhytmia    Other Mother         brain tumor diagnosed at age 67, stable    Diabetes Father         obesity    OSTEOARTHRITIS Father         ? RA as well    Cancer Father         skin cancer    Gout Father     Coronary Art Dis Father         stent age 77 yo    Heart Disease Maternal Grandmother         CHF in her 63's    Heart Disease Maternal Grandfather          81yo    Hypertension Maternal Grandfather     Diabetes Paternal Grandmother     Stroke Paternal Grandmother          in her [de-identified]    Arthritis-rheumatoid Paternal Grandmother     Cancer Paternal Grandfather         liver and colon CA,  mid 66's    Diabetes Paternal Grandfather     Depression Sister     Anxiety Sister     Other Sister         scoliosis    Other Daughter         Tourettes, tremor, tics, milk/soy allergy, lactose intolerance    Other Daughter         Tourettes, anxiety    Breast Cancer Paternal Aunt         dx in her 29's    Diabetes Paternal Aunt         type 2    Diabetes Paternal Uncle         type 2    Arthritis-rheumatoid Paternal Aunt     Gout Paternal Aunt         Social History     Socioeconomic History    Marital status:      Spouse name: Not on file    Number of children: 2    Years of education: Not on file    Highest education level: Not on file   Occupational History    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    Smoking status: Never Smoker    Smokeless tobacco: Never Used   Vaping Use    Vaping Use: Never used   Substance and Sexual Activity    Alcohol use: Not Currently     Comment: maybe 2-3 drinks of wine/cocktails per year    Drug use: No    Sexual activity: Yes     Partners: Male     Birth control/protection: Surgical     Comment: Hysterectomy 2013   Other Topics Concern     Service Not Asked    Blood Transfusions Not Asked    Caffeine Concern No     Comment: 1 cup of coffee a day maybe 4-5 days a week    Occupational Exposure Not Asked    Hobby Hazards Not Asked    Sleep Concern Not Asked    Stress Concern Not Asked    Weight Concern Yes     Comment: since eating healthier 4-2019, wt is down ~35-40 lbs    Special Diet Yes     Comment: since 4-2019: 1500 calorie diet, low carb (50 g/day), high protein    Back Care Yes     Comment: Ortho Dr. 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    Exercise: Walks 2 miles 2-3 x a week (low impact since  due to ortho issues)    Caffeine: 1 cup of coffee ~ 5 days a week, 1 cup of tea ~ 2 x a week    Weight: Has yo-yo'd between the 210's-230's x several years, but the past few years her weight has been creeping up and staying in the 230's-240's         Social Determinants of Health     Financial Resource Strain:     Difficulty of Paying Living Expenses: Not on file   Food Insecurity:     Worried About Running Out of Food in the Last Year: Not on file    Richelle of Food in the Last Year: Not on file   Transportation Needs:     Lack of Transportation (Medical): Not on file    Lack of Transportation (Non-Medical): Not on file   Physical Activity:     Days of Exercise per Week: Not on file    Minutes of Exercise per Session: Not on file   Stress:     Feeling of Stress : Not on file   Social Connections:     Frequency of Communication with Friends and Family: Not on file    Frequency of Social Gatherings with Friends and Family: Not on file    Attends Gnosticist Services: Not on file    Active Member of [address] [place] [address] or Organizations: Not on file    Attends Club or Organization Meetings: Not on file    Marital Status: Not on file   Intimate Partner Violence:     Fear of Current or Ex-Partner: Not on file    Emotionally Abused: Not on file    Physically Abused: Not on file    Sexually Abused: Not on file   Housing Stability:     Unable to Pay for Housing in the Last Year: Not on file    Number of [place] in the Last Year: Not on file    Unstable Housing in the Last Year: Not on file                ALLERGIES: Prednisone, Bleach (sodium hypochlorite), and Metformin    Review of Systems   Constitutional: Negative for activity change, appetite change and fever. HENT: Positive for sore throat. Respiratory: Negative for cough and shortness of breath. Gastrointestinal: Negative for diarrhea, nausea and vomiting.        Vitals:    01/20/22 1213   Pulse: 85   Resp: 16   Temp: 98.6 °F (37 °C)   SpO2: 96%       Physical Exam  Vitals and nursing note reviewed. Constitutional:       General: She is not in acute distress. Appearance: She is well-developed. She is not diaphoretic. HENT:      Mouth/Throat:      Mouth: Mucous membranes are moist.      Pharynx: Oropharynx is clear. Posterior oropharyngeal erythema present. No oropharyngeal exudate. Pulmonary:      Effort: Pulmonary effort is normal.   Neurological:      Mental Status: She is alert. Psychiatric:         Behavior: Behavior normal.         Thought Content: Thought content normal.         Judgment: Judgment normal.         MDM    ICD-10-CM ICD-9-CM   1. Exposure to COVID-19 virus  Z20.822 V01.79   2. Sore throat  J02.9 462       Orders Placed This Encounter    NOVEL CORONAVIRUS (COVID-19)     Scheduling Instructions:      1) Due to current limited availability of the COVID-19 PCR test, tests will be prioritized and may not be completed.              2) Order only if the test result will change clinical management or necessary for a return to mission-critical employment decision.              3) Print and instruct patient to adhere to CDC home isolation program. (Link Above)              4) Set up or refer patient for a monitoring program.              5) Have patient sign up for and leverage [employer] (if not previously done). Order Specific Question:   Is this test for diagnosis or screening? Answer:   Diagnosis of ill patient     Order Specific Question:   Symptomatic for COVID-19 as defined by CDC? Answer:   Yes     Order Specific Question:   Date of Symptom Onset     Answer:   1/20/2022     Order Specific Question:   Hospitalized for COVID-19? Answer:   No     Order Specific Question:   Admitted to ICU for COVID-19? Answer:   No     Order Specific Question:   Employed in healthcare setting? Answer:   Unknown     Order Specific Question:   Resident in a congregate (group) care setting?      Answer:   Unknown     Order Specific Question:   Pregnant? Answer:   No     Order Specific Question:   Previously tested for COVID-19? Answer:   Yes        Quarantine, await COVID results  Deep breathing exercises, ambulation  Tylenol prn  Increase fluids with electrolytes if decreased PO intake      If signs and symptoms become worse the pt is to go to the ER.          Procedures

## 2022-01-22 LAB
SARS-COV-2, NAA 2 DAY TAT: NORMAL
SARS-COV-2, NAA: NOT DETECTED

## 2022-03-19 PROBLEM — R94.31 ABNORMAL EKG: Status: ACTIVE | Noted: 2021-03-19

## 2022-03-19 PROBLEM — E78.2 MIXED HYPERLIPIDEMIA: Status: ACTIVE | Noted: 2018-03-14

## 2022-03-20 PROBLEM — R07.9 CHEST PAIN AT REST: Status: ACTIVE | Noted: 2021-03-19

## 2022-03-20 PROBLEM — M54.41 RIGHT-SIDED LOW BACK PAIN WITH RIGHT-SIDED SCIATICA: Status: ACTIVE | Noted: 2019-12-04

## 2022-04-08 DIAGNOSIS — K21.9 GASTROESOPHAGEAL REFLUX DISEASE, UNSPECIFIED WHETHER ESOPHAGITIS PRESENT: ICD-10-CM

## 2022-04-08 DIAGNOSIS — R10.13 DYSPEPSIA: ICD-10-CM

## 2022-04-08 DIAGNOSIS — R12 HEARTBURN: ICD-10-CM

## 2022-04-09 NOTE — TELEPHONE ENCOUNTER
I started pt on this  and referred her to GI for further evaluation. Has she had that appointment yet and if so what was the assessment and treatment plan. Please request note from GI office and put on my desk not tray. Thanks. Let me know if I need to refill.

## 2022-04-15 RX ORDER — OMEPRAZOLE 40 MG/1
CAPSULE, DELAYED RELEASE ORAL
Qty: 90 CAPSULE | Refills: 0 | OUTPATIENT
Start: 2022-04-15

## 2022-04-15 NOTE — TELEPHONE ENCOUNTER
Spoke to pt and she said that she had endoscopy done by Dr Bismark Foreman.  He told her she had reflux but not bad. Pt isn't taking the omeprazole any longer she is doing fine now.   She will contact pharmacy to let them know that she isn't taking it

## 2022-05-12 NOTE — TELEPHONE ENCOUNTER
PI of results. She will come in this week for labs and she will schedule appt when she comes in for the 6 month f/u. [Treadmill Exercise Test] : a treadmill stress test [None] : none [Low Sodium Diet] : low sodium diet [Patient] : the patient [FreeTextEntry1] : Cardiac Murmur- Stable; no further intervention at this time (see echo).\par CP/ SOB- LV function normal; ETT ordered to evaluate for the presence of underlying ischemic heart disease.\par Blood work drawn. Maintain a low caloric, low sodium, low cholesterol diet. Dietary counseling given, diet and exercise discussed in detail.

## 2022-05-17 ENCOUNTER — TELEPHONE (OUTPATIENT)
Dept: FAMILY MEDICINE CLINIC | Age: 57
End: 2022-05-17

## 2022-05-17 DIAGNOSIS — E55.9 VITAMIN D DEFICIENCY: ICD-10-CM

## 2022-05-17 DIAGNOSIS — E11.9 DIABETES MELLITUS TYPE 2, NONINSULIN DEPENDENT (HCC): ICD-10-CM

## 2022-05-17 DIAGNOSIS — E78.2 MIXED HYPERLIPIDEMIA: ICD-10-CM

## 2022-05-17 DIAGNOSIS — Z00.00 ROUTINE GENERAL MEDICAL EXAMINATION AT A HEALTH CARE FACILITY: Primary | ICD-10-CM

## 2022-05-17 NOTE — TELEPHONE ENCOUNTER
----- Message from Enrique Moore sent at 5/17/2022 11:06 AM EDT -----  Subject: Message to Provider    QUESTIONS  Information for Provider? patient request call back. has appointment on   the 05/25 and would like blood work ordered before then    Preferred Call Back Phone Number? [phone]      CPE scheduled for 5/25 @ 2:30    Lab only appt scheduled for tomorrow @ 9:30 let her know that she can have water and black coffee.

## 2022-05-19 ENCOUNTER — APPOINTMENT (OUTPATIENT)
Dept: FAMILY MEDICINE CLINIC | Age: 57
End: 2022-05-19

## 2022-05-20 LAB
25(OH)D3+25(OH)D2 SERPL-MCNC: 30.8 NG/ML (ref 30–100)
ALBUMIN SERPL-MCNC: 4.6 G/DL (ref 3.8–4.9)
ALBUMIN/CREAT UR: <8 MG/G CREAT (ref 0–29)
ALBUMIN/GLOB SERPL: 2 {RATIO} (ref 1.2–2.2)
ALP SERPL-CCNC: 85 IU/L (ref 44–121)
ALT SERPL-CCNC: 34 IU/L (ref 0–32)
AST SERPL-CCNC: 24 IU/L (ref 0–40)
BILIRUB SERPL-MCNC: 1.3 MG/DL (ref 0–1.2)
BUN SERPL-MCNC: 18 MG/DL (ref 6–24)
BUN/CREAT SERPL: 18 (ref 9–23)
CALCIUM SERPL-MCNC: 9.8 MG/DL (ref 8.7–10.2)
CHLORIDE SERPL-SCNC: 100 MMOL/L (ref 96–106)
CHOLEST SERPL-MCNC: 241 MG/DL (ref 100–199)
CO2 SERPL-SCNC: 22 MMOL/L (ref 20–29)
CREAT SERPL-MCNC: 0.99 MG/DL (ref 0.57–1)
CREAT UR-MCNC: 39.3 MG/DL
EGFR: 66 ML/MIN/1.73
ERYTHROCYTE [DISTWIDTH] IN BLOOD BY AUTOMATED COUNT: 13.8 % (ref 11.7–15.4)
EST. AVERAGE GLUCOSE BLD GHB EST-MCNC: 146 MG/DL
GLOBULIN SER CALC-MCNC: 2.3 G/DL (ref 1.5–4.5)
GLUCOSE SERPL-MCNC: 127 MG/DL (ref 65–99)
HBA1C MFR BLD: 6.7 % (ref 4.8–5.6)
HCT VFR BLD AUTO: 47 % (ref 34–46.6)
HDLC SERPL-MCNC: 62 MG/DL
HGB BLD-MCNC: 15.6 G/DL (ref 11.1–15.9)
IMP & REVIEW OF LAB RESULTS: NORMAL
LDLC SERPL CALC-MCNC: 144 MG/DL (ref 0–99)
MCH RBC QN AUTO: 28.8 PG (ref 26.6–33)
MCHC RBC AUTO-ENTMCNC: 33.2 G/DL (ref 31.5–35.7)
MCV RBC AUTO: 87 FL (ref 79–97)
MICROALBUMIN UR-MCNC: <3 UG/ML
PLATELET # BLD AUTO: 236 X10E3/UL (ref 150–450)
POTASSIUM SERPL-SCNC: 5.3 MMOL/L (ref 3.5–5.2)
PROT SERPL-MCNC: 6.9 G/DL (ref 6–8.5)
RBC # BLD AUTO: 5.41 X10E6/UL (ref 3.77–5.28)
SODIUM SERPL-SCNC: 141 MMOL/L (ref 134–144)
TRIGL SERPL-MCNC: 197 MG/DL (ref 0–149)
TSH SERPL DL<=0.005 MIU/L-ACNC: 1.7 UIU/ML (ref 0.45–4.5)
VLDLC SERPL CALC-MCNC: 35 MG/DL (ref 5–40)
WBC # BLD AUTO: 7.2 X10E3/UL (ref 3.4–10.8)

## 2022-05-22 NOTE — TELEPHONE ENCOUNTER
Labs pre-ordered for review at upcoming appointment with PCP:  Future Appointments  Date Time Provider Hallie Veloz  5/25/2022  2:30 PM Eve Diana MD [facility]

## 2022-05-25 ENCOUNTER — OFFICE VISIT (OUTPATIENT)
Dept: FAMILY MEDICINE CLINIC | Age: 57
End: 2022-05-25
Payer: COMMERCIAL

## 2022-05-25 VITALS
TEMPERATURE: 98.5 F | SYSTOLIC BLOOD PRESSURE: 136 MMHG | DIASTOLIC BLOOD PRESSURE: 88 MMHG | WEIGHT: 251.8 LBS | OXYGEN SATURATION: 98 % | HEIGHT: 68 IN | BODY MASS INDEX: 38.16 KG/M2 | HEART RATE: 65 BPM | RESPIRATION RATE: 16 BRPM

## 2022-05-25 DIAGNOSIS — E78.2 MIXED HYPERLIPIDEMIA: ICD-10-CM

## 2022-05-25 DIAGNOSIS — E11.9 DIABETES MELLITUS TYPE 2, NONINSULIN DEPENDENT (HCC): ICD-10-CM

## 2022-05-25 DIAGNOSIS — Z00.00 ROUTINE GENERAL MEDICAL EXAMINATION AT A HEALTH CARE FACILITY: Primary | ICD-10-CM

## 2022-05-25 PROCEDURE — 99396 PREV VISIT EST AGE 40-64: CPT | Performed by: FAMILY MEDICINE

## 2022-05-25 NOTE — PROGRESS NOTES
Chief Complaint   Patient presents with    Complete Physical     Fasting labs done 5/19/22    Diabetes    Cholesterol Problem       HISTORY OF PRESENT ILLNESS   HPI  Annual CPE  Had fasting labs done 5/19/22, here to review  History of Type 2 NIDDM, Mixed Hyperlipidemia  On no related medications, personal preference  No home BS log readings to report today  Diet: nothing special right now, just eating what she wants, mores sweets, carbs and comfort foods after work/stress  Exercise: walks on weekends x 2-3 miles; occasionally walks during the week in nicer weather (only does low impact since  due to ortho issues)  Caffeine: 1 cup of coffee ~ 5 days a week, 1 cup of tea ~ 2 x a week, no sodas  Weight: has yo-yo'd between the 210's-230's x several years, but the past few years her weight has been creeping up and staying in the 240's-250's range     REVIEW OF SYMPTOMS   Review of Systems   Constitutional: Negative. HENT: Negative. Eyes: Negative. Respiratory: Negative. Cardiovascular: Negative. Gastrointestinal: Negative. Genitourinary: Negative. Neurological: Negative. Endo/Heme/Allergies: Negative.             PROBLEM LIST/MEDICAL HISTORY     Problem List  Date Reviewed: 5/25/2022          Codes Class Noted    Chest pain at rest ICD-10-CM: R07.9  ICD-9-CM: 786.50  3/19/2021        Abnormal EKG ICD-10-CM: R94.31  ICD-9-CM: 794.31  3/19/2021        Right-sided low back pain with right-sided sciatica ICD-10-CM: M54.41  ICD-9-CM: 724.3  12/4/2019    Overview Signed 12/4/2019  3:50 PM by Sarwat Ramirez MD     [facility] Dr. Irene Gilmore, PT summer 2019; Pain Mgt, Dr. Brooke Dandy Injections 10/2019 & 11/2019; MRI             Mixed hyperlipidemia ICD-10-CM: I63.6  ICD-9-CM: 272.2  3/14/2018        Diabetes mellitus type 2, noninsulin dependent ([address] ICD-10-CM: E11.9  ICD-9-CM: 250.00  12/27/2016    Overview Signed 12/27/2016  2:21 PM by Sarwat Ramirez MD     [id number] Tear of medial meniscus of left knee ICD-10-CM: S16.490N  ICD-9-CM: 836.0  2016    Overview Signed 2016 10:42 AM by Alfredito Hollis MD     ~ Spring 2016, Ortho Dr Gloria Lee, scope scheduled 16             White coat syndrome with hypertension ICD-10-CM: I10  ICD-9-CM: 401.9  2016        Screening exams for skin cancer ICD-10-CM: Z12.83  ICD-9-CM: V76.43  2016    Overview Signed 2016  2:38 PM by Alfredito Hollis MD     Derm Dr Quintana             Right carpal tunnel syndrome ICD-10-CM: G56.01  ICD-9-CM: 354.0  2015    Overview Addendum 2015 11:04 AM by Alfredito Hollis MD     ~ 2013 Dr Pawel Laura Sessions: EMG study + Impression:  The above electrodiagnostic study reveals evidence of mildly prolonged motor and sensory distal latency of right median nerve, suggesting mild median nerve entrapment at wrist.             Vertigo ICD-10-CM: R42  ICD-9-CM: 780.4  6/3/2013        Vitamin D deficiency ICD-10-CM: E55.9  ICD-9-CM: 268.9  2012    Overview Signed 2012  1:42 PM by Alfredito Hollis MD     2012             History of low back pain, DDD, mild scoliosis and mild-moderate disc protrusions ICD-10-CM: Z87.39  ICD-9-CM: V13.59  2012        Mild-moderate lumbar disc protrusions L4-5, L5-S1, mild scoliosis ICD-10-CM: M51.26  ICD-9-CM: 722.10  2012    Overview Signed 2012  3:01 PM by Alfredito Hollis MD     2007             Abnormal Pap smear s/p Cryotherapy ~  ICD-10-CM: [id number]  ICD-9-CM: SPN7998  2010    Overview Signed 2010  8:53 AM by Alfredito Hollis MD     GYN Dr Desiree Ireland (normal spontaneous vaginal delivery) ICD-10-CM: O80  ICD-9-CM: 501  Unknown    Overview Signed 3/22/2010 10:24 AM by Alfredito Hollis MD     A2,  x2, SAB x2             Achilles tendonitis ICD-10-CM: M76.60  ICD-9-CM: 726.71  3/1/2010    Overview Signed 3/1/2010 11:14 AM by Simona Dandy L>R s/p cortisone             Anxiety ICD-10-CM: F41.9  ICD-9-CM: 300.00  3/1/2010    Overview Signed 3/1/2010 11:14 AM by Sandra Jama     mild             H/O Abnormal Pap Smears 1980, 1998 ICD-9-CM: 796.9  3/1/2010    Overview Signed 3/1/2010 11:17 AM by Sandra Jama     6008'W and 1998 (no Bx)             Congenital hip dysplasia ICD-10-CM: Q65.89  ICD-9-CM: 755.63  3/1/2010    Overview Signed 3/1/2010 11:18 AM by Sandra Jama     Left.  Recontruction, bone grafting 1969             Scoliosis ICD-10-CM: M41.9  ICD-9-CM: 737.30  8/1/2007    Overview Signed 3/1/2010 11:15 AM by Sandra Jama     mild             Hip pain ICD-10-CM: M25.559  ICD-9-CM: 719.45  8/1/2007    Overview Signed 3/1/2010 11:15 AM by Sandra Jama     right             S/P Surgery for Right Lateral Epicondylitis, 2005 ICD-10-CM: [id number]  ICD-9-CM: 726.32  1/1/2005    Overview Addendum 5/8/2012  3:03 PM by Chon Yuan MD     TOA             S/P LASIK surgery ICD-10-CM: L09.459  ICD-9-CM: V45.69  1/1/2005        GERD (gastroesophageal reflux disease) ICD-10-CM: K21.9  ICD-9-CM: 530.81  1/1/2003    Overview Addendum 5/8/2012  3:01 PM by Chon Yuan MD     [place] [place]                       PAST SURGICAL HISTORY     Past Surgical History:   Procedure Laterality Date    HAND/FINGER SURGERY UNLISTED  1997    right 3rd finger tendon rupture repair    HX COLONOSCOPY  04/19/2016    Polyp: tubular adenoma, repeat 3 yrs, Dr Amberly Caro HX COLONOSCOPY  07/2019    2 polyps, Dr. Ryan Soni, Repeat 3 yrs    HX ENDOSCOPY  11/17/2021    Mild GERD only, Dr. Sahuna Vazquez ARTHROSCOPY Left 12/13/2016    related to torn meniscus    HX MENISCUS REPAIR Right 01/31/2020    Dr. Chris Justice  04/11/2013    Cervix intact; Dr Nato Starkey    congenital hip dysplasia, left hip reconstruction and bone grafting  DE CRYOCAUTERY OF CERVIX  ~11/2008    Dr Rene Mancera  10/09    R Achilles; Dr. Monet Gregg  2005    right lateral epicondylitis surgery; TOA         MEDICATIONS     Current Outpatient Medications   Medication Sig    polyethylene glycol (MIRALAX) 17 gram/dose powder MIX AND DRINK 17G BY MOUTH EVERY DAY*NO SUB PERRIGO*    ashwagandha root extract 300 mg cap Take  by mouth daily. For anxiety    cholecalciferol, vitamin D3, (VITAMIN D3 PO) Take 3,000 Units by mouth daily. No current facility-administered medications for this visit.           ALLERGIES     Allergies   Allergen Reactions    Prednisone Shortness of Breath     SOB, flushed, red rash    Bleach (Sodium Hypochlorite) Other (comments)    Metformin Other (comments)     Severe depression and coldness in arms            SOCIAL HISTORY     Social History     Tobacco Use    Smoking status: Never Smoker    Smokeless tobacco: Never Used   Substance Use Topics    Alcohol use: Not Currently     Comment: maybe 2-3 drinks of wine/cocktails per year on speical occasions     Social History     Social History Narrative    Native of [place]; [place] [facility] [place]     ~[place]; Got re- 4/2012    She and  live in [facility]    2 daughters (1 local w/ 2 grand children, 1 in [address])    Patient works as a Speech Pathologist for [employer] getting part of her Hepatitis B series in the 1990's, not sure if she got #3        Diet: nothing special right now, just eating what she wants, mores sweets, carbs and comfort foods after work/stress    Exercise: walks on weekends x 2-3 miles; occasionally walks during the week in nicer weather (only does low impact since  due to ortho issues)    Caffeine: 1 cup of coffee ~ 5 days a week, 1 cup of tea ~ 2 x a week, no sodas    Weight: has yo-yo'd between the 210's-230's x several years, but the past few years her weight has been creeping up and staying in the 240's-250's range         Social History     Substance and Sexual Activity   Sexual Activity Yes    Partners: Male    Birth control/protection: Surgical    Comment: Hysterectomy 2013       IMMUNIZATIONS     Immunization History   Administered Date(s) Administered    COVID-19, Pfizer Purple top, DILUTE for use, 12+ yrs, 30mcg/0.3mL dose 2021, 2021, 2022    TD Vaccine 2009    Zoster Recombinant 2021, 2021         FAMILY HISTORY     Family History   Problem Relation Age of Onset    Arrhythmia Mother     Bipolar Disorder Mother     Cancer Mother         cervical dx in her late 29's   [facility] High Cholesterol Mother     Heart Disease Mother         heart arrhytmia    Other Mother         brain tumor diagnosed at age 67, stable    Diabetes Father         obesity    OSTEOARTHRITIS Father         ? RA as well    Gout Father     Coronary Art Dis Father         stent age 75 yo    Colon Cancer Father [de-identified]        surgical resection -    Cataract Father [de-identified]    SKIN CANCER Father     Stroke Father [de-identified]        TIA    Heart Disease Maternal Grandmother         CHF in her 63's    Heart Disease Maternal Grandfather          81yo    Hypertension Maternal Grandfather     Diabetes Paternal Grandmother     Stroke Paternal Grandmother          in her [de-identified]    Arthritis-rheumatoid Paternal Grandmother     Cancer Paternal Grandfather         liver and colon CA,  mid 66's    Diabetes Paternal Grandfather     Depression Sister     Anxiety Sister     Other Sister         scoliosis    Other Daughter         Tourettes, tremor, tics, milk/soy allergy, lactose intolerance    Other Daughter         Tourettes, anxiety    Breast Cancer Paternal Aunt         dx in her 29's    Diabetes Paternal Aunt         type 2    Diabetes Paternal Uncle         type 2    Arthritis-rheumatoid Paternal Aunt     Gout Paternal Aunt VITALS     Visit Vitals  /88 (BP 1 Location: Left upper arm, BP Patient Position: Sitting, BP Cuff Size: Large adult)   Pulse 65   Temp 98.5 °F (36.9 °C) (Oral)   Resp 16   Ht 5' 8\" (1.727 m)   Wt 251 lb 12.8 oz (114.2 kg)   LMP 03/16/2013   SpO2 98%   BMI 38.29 kg/m²          PHYSICAL EXAMINATION   Physical Exam  Vitals reviewed. Constitutional:       General: She is not in acute distress. Appearance: Normal appearance. HENT:      Right Ear: Tympanic membrane normal.      Left Ear: Tympanic membrane normal.   Eyes:      Conjunctiva/sclera: Conjunctivae normal.   Neck:      Thyroid: No thyroid mass, thyromegaly or thyroid tenderness. Vascular: No carotid bruit. Cardiovascular:      Rate and Rhythm: Normal rate and regular rhythm. Heart sounds: Normal heart sounds. Pulmonary:      Effort: Pulmonary effort is normal.      Breath sounds: Normal breath sounds. Abdominal:      General: There is no distension. Palpations: Abdomen is soft. There is no mass. Tenderness: There is no abdominal tenderness. Musculoskeletal:         General: No swelling or tenderness. Cervical back: Neck supple. Right lower leg: No edema. Left lower leg: No edema. Lymphadenopathy:      Cervical: No cervical adenopathy. Skin:     General: Skin is warm and dry. Neurological:      General: No focal deficit present. Mental Status: She is alert and oriented to person, place, and time. Mental status is at baseline. Cranial Nerves: No cranial nerve deficit.    Psychiatric:         Mood and Affect: Mood normal.                LABORATORY DATA/ANCILLARY/IMAGING     Results for orders placed or performed in visit on 05/17/22   CBC W/O DIFF   Result Value Ref Range    WBC 7.2 3.4 - 10.8 x10E3/uL    RBC 5.41 (H) 3.77 - 5.28 x10E6/uL    HGB 15.6 11.1 - 15.9 g/dL    HCT 47.0 (H) 34.0 - 46.6 %    MCV 87 79 - 97 fL    MCH 28.8 26.6 - 33.0 pg    MCHC 33.2 31.5 - 35.7 g/dL    RDW 13.8 11.7 - 15.4 %    PLATELET 852 995 - 331 G12A1/BW   METABOLIC PANEL, COMPREHENSIVE   Result Value Ref Range    Glucose 127 (H) 65 - 99 mg/dL    BUN 18 6 - 24 mg/dL    Creatinine 0.99 0.57 - 1.00 mg/dL    eGFR 66 >59 mL/min/1.73    BUN/Creatinine ratio 18 9 - 23    Sodium 141 134 - 144 mmol/L    Potassium 5.3 (H) 3.5 - 5.2 mmol/L    Chloride 100 96 - 106 mmol/L    CO2 22 20 - 29 mmol/L    Calcium 9.8 8.7 - 10.2 mg/dL    Protein, total 6.9 6.0 - 8.5 g/dL    Albumin 4.6 3.8 - 4.9 g/dL    GLOBULIN, TOTAL 2.3 1.5 - 4.5 g/dL    A-G Ratio 2.0 1.2 - 2.2    Bilirubin, total 1.3 (H) 0.0 - 1.2 mg/dL    Alk. phosphatase 85 44 - 121 IU/L    AST (SGOT) 24 0 - 40 IU/L    ALT (SGPT) 34 (H) 0 - 32 IU/L   LIPID PANEL   Result Value Ref Range    Cholesterol, total 241 (H) 100 - 199 mg/dL    Triglyceride 197 (H) 0 - 149 mg/dL    HDL Cholesterol 62 >39 mg/dL    VLDL, calculated 35 5 - 40 mg/dL    LDL, calculated 144 (H) 0 - 99 mg/dL   TSH 3RD GENERATION   Result Value Ref Range    TSH 1.700 0.450 - 4.500 uIU/mL   HEMOGLOBIN A1C WITH EAG   Result Value Ref Range    Hemoglobin A1c 6.7 (H) 4.8 - 5.6 %    Estimated average glucose 146 mg/dL   MICROALBUMIN, UR, RAND W/ MICROALB/CREAT RATIO   Result Value Ref Range    Creatinine, urine 39.3 Not Estab. mg/dL    Microalbumin, urine <3.0 Not Estab. ug/mL    Microalb/Creat ratio (ug/mg creat.) <8 0 - 29 mg/g creat   VITAMIN D, 25 HYDROXY   Result Value Ref Range    VITAMIN D, 25-HYDROXY 30.8 30.0 - 100.0 ng/mL   CVD REPORT   Result Value Ref Range    INTERPRETATION Note              ASSESSMENT & PLAN   Diagnoses and all orders for this visit:    1. Routine general medical examination at a health care facility    2. Diabetes mellitus type 2, noninsulin dependent ([address]  Lab Results   Component Value Date/Time    Hemoglobin A1c 6.7 (H) 05/19/2022 09:48 AM    Hemoglobin A1c 6.1 (H) 01/04/2021 12:00 AM    Hemoglobin A1c 6.1 (H) 11/30/2019 10:58 AM   -      DIABETES EYE EXAM    3.  Mixed hyperlipidemia  Lab Results   Component Value Date/Time    Cholesterol, total 241 (H) 05/19/2022 09:48 AM    HDL Cholesterol 62 05/19/2022 09:48 AM    LDL, calculated 144 (H) 05/19/2022 09:48 AM    LDL, calculated 107 (H) 11/30/2019 10:58 AM    VLDL, calculated 35 05/19/2022 09:48 AM    VLDL, calculated 20 11/30/2019 10:58 AM    Triglyceride 197 (H) 05/19/2022 09:48 AM    CHOL/HDL Ratio 2.9 10/09/2009 08:33 AM       4. BMI 38.0-38.9,adult    Recent fasting labs from 5/19/22, cardiovascular risk and specific lipid/LDL/HgbA1c goals reviewed  Reviewed diet, nutrition, exercise, weight management, BMI/goals. Age/risk based screening recommendations, health maintenance & prevention counseling. Cancer screening USPTFS guidelines reviewed w/ pt today. Discussed benefits/positive/negative outcomes of screening based on age/risk stratification. Informed consent for/against screening based on pt's personal hx/risk factors. Updated in history above and health maintenance. Due well woman visit/gyn exam/pap and mammogram screen all of which she does through her Gyn. She is overdue and calling to get those scheduled w/ Dr. Daniel Kilpatrick this summer  Her 3 yr colonoscopy follow up is due 7/2022 w/ Dr Luis Inman. Scheduling.   She will return for fasting follow up in 3 months

## 2022-05-25 NOTE — PROGRESS NOTES
Chief Complaint   Patient presents with    Complete Physical    Cholesterol Problem     had labs last week    Diabetes     1. \"Have you been to the ER, urgent care clinic since your last visit? Hospitalized since your last visit? \" No    2. \"Have you seen or consulted any other health care providers outside of the [facility] gyn Dr Shelli Pradhan since your last visit? \" yes Celine Sim NP    3. For patients aged 39-70: Has the patient had a colonoscopy / FIT/ Cologuard? 7/2019 2 polyps, Dr. Benjamin Sun, Repeat 3 yrs      If the patient is female:    4. For patients aged 41-77: Has the patient had a mammogram within the past 2 years? 6/2020 [facility]      5. For patients aged 21-65: Has the patient had a pap smear?  Gyn Dr Marjorie Muhammad she will call to schedule     Due eye exam     Due foot exam

## 2022-11-02 RX ORDER — POLYETHYLENE GLYCOL 3350 17 G/17G
POWDER, FOR SOLUTION ORAL
Qty: 510 G | Status: SHIPPED | OUTPATIENT
Start: 2022-11-02

## 2023-07-24 ENCOUNTER — TELEPHONE (OUTPATIENT)
Age: 58
End: 2023-07-24

## 2023-07-24 NOTE — TELEPHONE ENCOUNTER
Spoke with pt regarding to CPE appt and schedule pt on 01/15/24. Pt than stated she went to urgent care 07/07 do to insect bite on lower leg. pt said the urgent care requesting her to see her PCP and do lyme test after 30 days. the next available appt is on 08/24 and pt refused. Pt is requesting call back from nurse MIRANDA . Pt can be reach [phone] Thank you .  HB 07/24/23

## 2023-07-24 NOTE — TELEPHONE ENCOUNTER
----- Message from Vannessa Wilkerson sent at 7/24/2023 10:52 AM EDT -----  Subject: Appointment Request    Reason for Call: Established Patient Appointment needed: Routine Physical   Exam    QUESTIONS    Reason for appointment request? No appointments available during search     Additional Information for Provider? Pt would like to have an CPE appt. There are available appt showing for the year.  Pt also needs Please call   back to advise.  ---------------------------------------------------------------------------  --------------  Jordan NAM  [phone]; OK to leave message on voicemail  ---------------------------------------------------------------------------  --------------  SCRIPT ANSWERS

## 2023-07-25 NOTE — TELEPHONE ENCOUNTER
LVM for pt that I would schedule a f/u appt for the tick bite and that I would send the date thru [facility]. I booked her for 8/1 @ 9:40.

## 2023-08-01 ENCOUNTER — OFFICE VISIT (OUTPATIENT)
Age: 58
End: 2023-08-01
Payer: COMMERCIAL

## 2023-08-01 VITALS
OXYGEN SATURATION: 98 % | TEMPERATURE: 98.7 F | DIASTOLIC BLOOD PRESSURE: 85 MMHG | BODY MASS INDEX: 38.25 KG/M2 | RESPIRATION RATE: 16 BRPM | WEIGHT: 252.4 LBS | HEIGHT: 68 IN | HEART RATE: 70 BPM | SYSTOLIC BLOOD PRESSURE: 138 MMHG

## 2023-08-01 DIAGNOSIS — E11.9 DIABETES MELLITUS TYPE 2, NONINSULIN DEPENDENT (HCC): ICD-10-CM

## 2023-08-01 DIAGNOSIS — S80.862D: ICD-10-CM
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
## 2023-08-01 DIAGNOSIS — I10 WHITE COAT SYNDROME WITH HYPERTENSION: ICD-10-CM

## 2023-08-01 DIAGNOSIS — E78.2 MIXED HYPERLIPIDEMIA: ICD-10-CM

## 2023-08-01 DIAGNOSIS — S80.862D: Primary | ICD-10-CM

## 2023-08-01 DIAGNOSIS — L08.9: ICD-10-CM
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
## 2023-08-01 DIAGNOSIS — L08.9: Primary | ICD-10-CM

## 2023-08-01 PROCEDURE — 99214 OFFICE O/P EST MOD 30 MIN: CPT | Performed by: FAMILY MEDICINE

## 2023-08-01 PROCEDURE — 3079F DIAST BP 80-89 MM HG: CPT | Performed by: FAMILY MEDICINE

## 2023-08-01 PROCEDURE — 3075F SYST BP GE 130 - 139MM HG: CPT | Performed by: FAMILY MEDICINE

## 2023-08-01 SDOH — ECONOMIC STABILITY: FOOD INSECURITY: WITHIN THE PAST 12 MONTHS, THE FOOD YOU BOUGHT JUST DIDN'T LAST AND YOU DIDN'T HAVE MONEY TO GET MORE.: NEVER TRUE

## 2023-08-01 SDOH — ECONOMIC STABILITY: INCOME INSECURITY: HOW HARD IS IT FOR YOU TO PAY FOR THE VERY BASICS LIKE FOOD, HOUSING, MEDICAL CARE, AND HEATING?: NOT HARD AT ALL

## 2023-08-01 SDOH — ECONOMIC STABILITY: HOUSING INSECURITY
IN THE LAST 12 MONTHS, WAS THERE A TIME WHEN YOU DID NOT HAVE A STEADY PLACE TO SLEEP OR SLEPT IN A SHELTER (INCLUDING NOW)?: NO

## 2023-08-01 SDOH — ECONOMIC STABILITY: FOOD INSECURITY: WITHIN THE PAST 12 MONTHS, YOU WORRIED THAT YOUR FOOD WOULD RUN OUT BEFORE YOU GOT MONEY TO BUY MORE.: NEVER TRUE

## 2023-08-01 ASSESSMENT — PATIENT HEALTH QUESTIONNAIRE - PHQ9
SUM OF ALL RESPONSES TO PHQ9 QUESTIONS 1 & 2: 0
2. FEELING DOWN, DEPRESSED OR HOPELESS: 0
1. LITTLE INTEREST OR PLEASURE IN DOING THINGS: 0
SUM OF ALL RESPONSES TO PHQ QUESTIONS 1-9: 0

## 2023-08-01 ASSESSMENT — ENCOUNTER SYMPTOMS
RESPIRATORY NEGATIVE: 1
GASTROINTESTINAL NEGATIVE: 1
EYES NEGATIVE: 1

## 2023-08-01 NOTE — PROGRESS NOTES
Chief Complaint   Patient presents with    Follow-up     [facility] 7-7-23 after treatment for tick bite 6-26-23    Insect Bite     Infected tick bite left calf 6-26-23     HISTORY OF PRESENT ILLNESS   Patient presents for follow up after treatment at [facility] on 7-7-23 for infected tick bite left calf. She was sitting outdoors on a lawn in [address] [facility]. There were several ticks out in the grass where she was sitting. One in particular was fixated firmly to her left inner calf. She pulled it off. There was a red dot at the bite site for about 4 days. But then by day 5-6 the surrounding area of redness began to enlarge. It formed a pale pink ring around the bite mai that was about the size of a small orange. The center got bright red w/ a small pustular appearing surface. She was using an OTC abx cream and it wasn't getting any better which is what prompted her to go to . She was not running any fevers and no chills, sweats, headache, neck stiffness, fatigue, swelling, myalgias, joint pain, other lesions or rashes. At [facility] she was also afebrile. She was started on a 10 day course of Doxy 100 mg bid and advised to follow up w/ PCP in 2 weeks for Lyme Testing. The area started to improve by around 1 week after starting on the abx. It has gotten much better. All the redness has cleared up. There is a small residual scab in the area. She continues to feel well and remains asymptomatic. She has history of Type 2 NIDDM. She periodically checks home BS's and fasting in the AM they have been running in the 119-125 range. She is on no related medications for her Diabetes, Hyperlipidemia or Hypertension. Her personal preference.    Diet: nothing special right now, just eating what she wants, mores sweets, carbs and comfort foods after work/stress  Caffeine: 1 cup of coffee ~ 5 days a week, 1 cup of tea ~ 2 x a week, no sodas   Exercise: nothing regular right now, occasionally walks on weekends x 2-3

## 2023-08-01 NOTE — PROGRESS NOTES
Chief Complaint   Patient presents with    Follow-up     [facility] 7-7-23 after treatment for tick bite 6-26-23    Insect Bite     Infected tick bite left calf 6-26-23     1. \"Have you been to the ER, urgent care clinic since your last visit? Hospitalized since your last visit? \" [facility] for the tick bite and once for strep test    2. \"Have you seen or consulted any other health care providers outside of the [address] [place] [address] since your last visit? \" No     3. For patients aged 43-73: Has the patient had a colonoscopy / FIT/ Cologuard? Yes - no Care Gap present 2022 Dr Barbra Carey repeat 3 years       If the patient is female:    3. For patients aged 43-66: Has the patient had a mammogram within the past 2 years? Yes - Care Gap present. Most recent result on file 2020 chelle @ [facility]      5. For patients aged 21-65: Has the patient had a pap smear?  Yes - no Care Gap present gyn @ [facility] a couple years ago

## 2023-08-02 LAB
ALBUMIN SERPL-MCNC: 4.4 G/DL (ref 3.8–4.9)
ALBUMIN/GLOB SERPL: 1.8 {RATIO} (ref 1.2–2.2)
ALP SERPL-CCNC: 90 IU/L (ref 44–121)
ALT SERPL-CCNC: 39 IU/L (ref 0–32)
AST SERPL-CCNC: 28 IU/L (ref 0–40)
B BURGDOR IGG+IGM SER QL IA: NEGATIVE
BILIRUB SERPL-MCNC: 1.2 MG/DL (ref 0–1.2)
BUN SERPL-MCNC: 14 MG/DL (ref 6–24)
BUN/CREAT SERPL: 15 (ref 9–23)
CALCIUM SERPL-MCNC: 10 MG/DL (ref 8.7–10.2)
CHLORIDE SERPL-SCNC: 102 MMOL/L (ref 96–106)
CO2 SERPL-SCNC: 25 MMOL/L (ref 20–29)
CREAT SERPL-MCNC: 0.95 MG/DL (ref 0.57–1)
EGFRCR SERPLBLD CKD-EPI 2021: 69 ML/MIN/1.73
ERYTHROCYTE [DISTWIDTH] IN BLOOD BY AUTOMATED COUNT: 14 % (ref 11.7–15.4)
GLOBULIN SER CALC-MCNC: 2.4 G/DL (ref 1.5–4.5)
GLUCOSE SERPL-MCNC: 129 MG/DL (ref 70–99)
HBA1C MFR BLD: 6.9 % (ref 4.8–5.6)
HCT VFR BLD AUTO: 44.9 % (ref 34–46.6)
HGB BLD-MCNC: 14.8 G/DL (ref 11.1–15.9)
MCH RBC QN AUTO: 28.2 PG (ref 26.6–33)
MCHC RBC AUTO-ENTMCNC: 33 G/DL (ref 31.5–35.7)
MCV RBC AUTO: 86 FL (ref 79–97)
PLATELET # BLD AUTO: 241 X10E3/UL (ref 150–450)
POTASSIUM SERPL-SCNC: 5 MMOL/L (ref 3.5–5.2)
PROT SERPL-MCNC: 6.8 G/DL (ref 6–8.5)
RBC # BLD AUTO: 5.25 X10E6/UL (ref 3.77–5.28)
SODIUM SERPL-SCNC: 142 MMOL/L (ref 134–144)
WBC # BLD AUTO: 6.8 X10E3/UL (ref 3.4–10.8)

## 2023-11-03 RX ORDER — POLYETHYLENE GLYCOL 3350 17 G/17G
POWDER, FOR SOLUTION ORAL
Qty: 510 G | Refills: 5 | Status: SHIPPED | OUTPATIENT
Start: 2023-11-03

## 2024-01-15 ENCOUNTER — OFFICE VISIT (OUTPATIENT)
Age: 59
End: 2024-01-15
Payer: COMMERCIAL

## 2024-01-15 VITALS
BODY MASS INDEX: 35.63 KG/M2 | SYSTOLIC BLOOD PRESSURE: 134 MMHG | WEIGHT: 240.6 LBS | HEIGHT: 69 IN | DIASTOLIC BLOOD PRESSURE: 92 MMHG | OXYGEN SATURATION: 97 % | RESPIRATION RATE: 16 BRPM | TEMPERATURE: 98.5 F | HEART RATE: 63 BPM

## 2024-01-15 DIAGNOSIS — Z12.31 SCREENING MAMMOGRAM FOR BREAST CANCER: ICD-10-CM

## 2024-01-15 DIAGNOSIS — Z23 NEED FOR TD VACCINE: ICD-10-CM

## 2024-01-15 DIAGNOSIS — R03.0 BORDERLINE HIGH BLOOD PRESSURE: ICD-10-CM

## 2024-01-15 DIAGNOSIS — E78.2 MIXED HYPERLIPIDEMIA: ICD-10-CM

## 2024-01-15 DIAGNOSIS — E11.9 DIABETES MELLITUS TYPE 2, NONINSULIN DEPENDENT (HCC): ICD-10-CM

## 2024-01-15 DIAGNOSIS — Z00.00 ANNUAL PHYSICAL EXAM: Primary | ICD-10-CM

## 2024-01-15 DIAGNOSIS — Z23 ENCOUNTER FOR IMMUNIZATION: ICD-10-CM

## 2024-01-15 DIAGNOSIS — K59.09 CHRONIC CONSTIPATION: ICD-10-CM

## 2024-01-15 PROCEDURE — 3080F DIAST BP >= 90 MM HG: CPT | Performed by: FAMILY MEDICINE

## 2024-01-15 PROCEDURE — 99396 PREV VISIT EST AGE 40-64: CPT | Performed by: FAMILY MEDICINE

## 2024-01-15 PROCEDURE — 3075F SYST BP GE 130 - 139MM HG: CPT | Performed by: FAMILY MEDICINE

## 2024-01-15 PROCEDURE — 90714 TD VACC NO PRESV 7 YRS+ IM: CPT | Performed by: FAMILY MEDICINE

## 2024-01-15 PROCEDURE — 90471 IMMUNIZATION ADMIN: CPT | Performed by: FAMILY MEDICINE

## 2024-01-15 RX ORDER — POLYETHYLENE GLYCOL 3350 17 G/17G
POWDER, FOR SOLUTION ORAL
Qty: 510 G | Status: SHIPPED | OUTPATIENT
Start: 2024-01-15

## 2024-01-15 ASSESSMENT — PATIENT HEALTH QUESTIONNAIRE - PHQ9
SUM OF ALL RESPONSES TO PHQ QUESTIONS 1-9: 0
SUM OF ALL RESPONSES TO PHQ QUESTIONS 1-9: 0
SUM OF ALL RESPONSES TO PHQ9 QUESTIONS 1 & 2: 0
1. LITTLE INTEREST OR PLEASURE IN DOING THINGS: 0
2. FEELING DOWN, DEPRESSED OR HOPELESS: 0
SUM OF ALL RESPONSES TO PHQ QUESTIONS 1-9: 0
SUM OF ALL RESPONSES TO PHQ QUESTIONS 1-9: 0

## 2024-01-15 ASSESSMENT — ENCOUNTER SYMPTOMS
EYES NEGATIVE: 1
GASTROINTESTINAL NEGATIVE: 1
RESPIRATORY NEGATIVE: 1
ALLERGIC/IMMUNOLOGIC NEGATIVE: 1

## 2024-01-15 NOTE — PROGRESS NOTES
Chief Complaint   Patient presents with    Annual Exam     Fasting    Cholesterol Problem    Diabetes     HISTORY OF PRESENT ILLNESS   Annual CPE  Fasting follow up Type 2 NIDDM, Hyperlipidemia and labs  On no related medications over time per personal preference  She had not been checking her home BS's x about 6 months then recently restarted checking fasting BS's qam this past week and they have been running in the 135-150 range  No signs/symptoms of hyper or hypoglycemia    Diet: since 9-2023 reduced portions; had been eating a lot more sweets, carbs and comfort foods due to stress so recently trying to cut back on those as well and is seeing a counselor through work    Caffeine: 1 cup of decaff coffee in the mornings ~ 5 days a week, 1 cup of black tea ~ 3-4 x a week, no sodas     Exercise: sporadically walks for exercise; since ~  goes out walking 3-5 x a week up to 1-3 miles; only does low impact things since  due to ortho issues         Weight: has yo-yo'd between the 210's-230's x in prior years, but the past few years her weight has been creeping up and staying in the 240's-250's range and maintaining there; since ~ 9-2023 has been making some modifications and so far weight down from 252 lbs to 240 lbs        REVIEW OF SYMPTOMS   Review of Systems   Constitutional: Negative.    HENT: Negative.     Eyes: Negative.    Respiratory: Negative.     Cardiovascular: Negative.    Gastrointestinal: Negative.    Endocrine: Negative.    Genitourinary: Negative.    Allergic/Immunologic: Negative.    Neurological: Negative.    Hematological: Negative.          PROBLEM LIST/MEDICAL HISTORY     Patient Active Problem List    Diagnosis Date Noted    Abnormal EKG 03/19/2021    Chest pain at rest 03/19/2021    Right-sided low back pain with right-sided sciatica 12/04/2019     Overview Note:     [facility]. Shane [facility]edo, PT summer 2019; Pain Mgt, Dr. Goncalves   Injections 10/2019 & 11/2019; MRI        Mixed

## 2024-01-15 NOTE — PROGRESS NOTES
Chief Complaint   Patient presents with    Annual Exam    Cholesterol Problem     fasting    Diabetes     1. \"Have you been to the ER, urgent care clinic since your last visit?  Hospitalized since your last visit?\" [facility] seen 2 times one for ST and the next for laryngitis    2. \"Have you seen or consulted any other health care providers outside of the [facility] since your last visit?\" No     3. For patients aged 45-75: Has the patient had a colonoscopy / FIT/ Cologuard? Yes - no Care Gap present 12/2022 Sigmoid Diverticulosis, Polyps x 2, Benign, Repeat 3 yrs; Dr. SARAH Mullen       If the patient is female:    4. For patients aged 40-74: Has the patient had a mammogram within the past 2 years? Yes - no Care Gap present 6/2021       5. For patients aged 21-65: Has the patient had a pap smear?  Gyn Dr Love a couple of years ago @ [facility]

## 2024-01-16 LAB
ALBUMIN SERPL-MCNC: 4.6 G/DL (ref 3.8–4.9)
ALBUMIN/CREAT UR: 2 MG/G CREAT (ref 0–29)
ALBUMIN/GLOB SERPL: 1.8 {RATIO} (ref 1.2–2.2)
ALP SERPL-CCNC: 79 IU/L (ref 44–121)
ALT SERPL-CCNC: 29 IU/L (ref 0–32)
AST SERPL-CCNC: 22 IU/L (ref 0–40)
BILIRUB SERPL-MCNC: 1.7 MG/DL (ref 0–1.2)
BUN SERPL-MCNC: 12 MG/DL (ref 6–24)
BUN/CREAT SERPL: 13 (ref 9–23)
CALCIUM SERPL-MCNC: 9.9 MG/DL (ref 8.7–10.2)
CHLORIDE SERPL-SCNC: 103 MMOL/L (ref 96–106)
CHOLEST SERPL-MCNC: 232 MG/DL (ref 100–199)
CO2 SERPL-SCNC: 26 MMOL/L (ref 20–29)
CREAT SERPL-MCNC: 0.93 MG/DL (ref 0.57–1)
CREAT UR-MCNC: 174.2 MG/DL
EGFRCR SERPLBLD CKD-EPI 2021: 71 ML/MIN/1.73
GLOBULIN SER CALC-MCNC: 2.6 G/DL (ref 1.5–4.5)
GLUCOSE SERPL-MCNC: 116 MG/DL (ref 70–99)
HBA1C MFR BLD: 6.8 % (ref 4.8–5.6)
HDLC SERPL-MCNC: 65 MG/DL
IMP & REVIEW OF LAB RESULTS: NORMAL
LDLC SERPL CALC-MCNC: 138 MG/DL (ref 0–99)
MICROALBUMIN UR-MCNC: 3.2 UG/ML
POTASSIUM SERPL-SCNC: 4.6 MMOL/L (ref 3.5–5.2)
PROT SERPL-MCNC: 7.2 G/DL (ref 6–8.5)
SODIUM SERPL-SCNC: 141 MMOL/L (ref 134–144)
TRIGL SERPL-MCNC: 167 MG/DL (ref 0–149)
TSH SERPL DL<=0.005 MIU/L-ACNC: 1.25 UIU/ML (ref 0.45–4.5)
VLDLC SERPL CALC-MCNC: 29 MG/DL (ref 5–40)

## 2024-02-26 ENCOUNTER — OFFICE VISIT (OUTPATIENT)
Age: 59
End: 2024-02-26
Payer: COMMERCIAL

## 2024-02-26 VITALS
HEIGHT: 69 IN | TEMPERATURE: 98.2 F | WEIGHT: 242.4 LBS | DIASTOLIC BLOOD PRESSURE: 83 MMHG | RESPIRATION RATE: 14 BRPM | HEART RATE: 81 BPM | OXYGEN SATURATION: 97 % | BODY MASS INDEX: 35.9 KG/M2 | SYSTOLIC BLOOD PRESSURE: 152 MMHG

## 2024-02-26 DIAGNOSIS — J02.0 ACUTE STREPTOCOCCAL PHARYNGITIS: Primary | ICD-10-CM

## 2024-02-26 PROCEDURE — 99213 OFFICE O/P EST LOW 20 MIN: CPT | Performed by: NURSE PRACTITIONER

## 2024-02-26 PROCEDURE — 3077F SYST BP >= 140 MM HG: CPT | Performed by: NURSE PRACTITIONER

## 2024-02-26 PROCEDURE — 3079F DIAST BP 80-89 MM HG: CPT | Performed by: NURSE PRACTITIONER

## 2024-02-26 RX ORDER — AMOXICILLIN AND CLAVULANATE POTASSIUM 875; 125 MG/1; MG/1
1 TABLET, FILM COATED ORAL 2 TIMES DAILY
Qty: 14 TABLET | Refills: 0 | Status: SHIPPED | OUTPATIENT
Start: 2024-02-26 | End: 2024-03-04

## 2024-02-26 ASSESSMENT — ENCOUNTER SYMPTOMS
SINUS PRESSURE: 0
WHEEZING: 0
SHORTNESS OF BREATH: 0
CHEST TIGHTNESS: 0
SORE THROAT: 1
COUGH: 0

## 2024-02-26 NOTE — PROGRESS NOTES
Chief Complaint   Patient presents with    Pharyngitis     Sore Throat since Wednesday 2/21/2024. Feels like sharp stabbing pain in throat, head is stuffy and feels pressure. Went to [facility] Friday 2/23/24 - COVID/Flu/Strep negative. Been using at home remedies, such as gargling salt water, lemon, honey, garlic with milk. NyQuil/DayQuil with no improvement. Feels like its getting worse.      \"Have you been to the ER, urgent care clinic since your last visit?  Hospitalized since your last visit?\"    [facility] [place] - 2/23/24 - \"viral URI\"    “Have you seen or consulted any other health care providers outside of [facility] since your last visit?”    NO                1/15/2024     1:26 PM   PHQ-9    Little interest or pleasure in doing things 0   Feeling down, depressed, or hopeless 0   PHQ-2 Score 0   PHQ-9 Total Score 0           Financial Resource Strain: Low Risk  (8/1/2023)    Overall Financial Resource Strain (CARDI)     Difficulty of Paying Living Expenses: Not hard at all      Food Insecurity: Not on file (8/1/2023)          Health Maintenance Due   Topic Date Due    Diabetic retinal exam  Never done    Breast cancer screen  06/03/2022    DTaP/Tdap/Td vaccine (1 - Tdap) 01/16/2024    Respiratory Syncytial Virus (RSV) Pregnant or age 60 yrs+ (1 - 1-dose 60+ series) Never done

## 2024-02-26 NOTE — PROGRESS NOTES
Subjective:      Patient ID: Roseanne Fulton is a 59 y.o. female.    Pharyngitis  Associated symptoms include congestion and a sore throat. Pertinent negatives include no chills, coughing, fever or headaches.     C/o sore throat x 6 days.  Went to [facility] 4 days ago, reports rapid strep and Covid tests were negative.  Sore throat has persisted.  No fever.  No known strep exposure.  Taking dayquil with little sx relief.      Past medical history, social history, family history and medications were reviewed and updated.  Blood pressure (!) 152/83, pulse 81, temperature 98.2 °F (36.8 °C), temperature source Temporal, resp. rate 14, height 1.74 m (5' 8.5\"), weight 110 kg (242 lb 6.4 oz), SpO2 97 %.    Review of Systems   Constitutional:  Negative for chills and fever.   HENT:  Positive for congestion and sore throat. Negative for ear pain and sinus pressure.    Respiratory:  Negative for cough, chest tightness, shortness of breath and wheezing.    Cardiovascular: Negative.    Neurological:  Negative for headaches.   All other systems reviewed and are negative.      Objective:   Physical Exam  Constitutional:       General: She is not in acute distress.  HENT:      Right Ear: Tympanic membrane and ear canal normal.      Left Ear: Tympanic membrane and ear canal normal.      Mouth/Throat:      Pharynx: Oropharyngeal exudate and posterior oropharyngeal erythema present.   Cardiovascular:      Rate and Rhythm: Normal rate and regular rhythm.      Heart sounds: Normal heart sounds.   Pulmonary:      Effort: Pulmonary effort is normal.      Breath sounds: Normal breath sounds.   Musculoskeletal:      Cervical back: Neck supple.      Right lower leg: No edema.      Left lower leg: No edema.   Lymphadenopathy:      Cervical: No cervical adenopathy.   Skin:     General: Skin is warm and dry.   Neurological:      Mental Status: She is alert.      Coordination: Coordination normal.         Assessment / Plan:      Roseanne was seen

## 2024-04-29 ENCOUNTER — TELEPHONE (OUTPATIENT)
Age: 59
End: 2024-04-29

## 2024-04-29 NOTE — TELEPHONE ENCOUNTER
Left voicemail for patient to call back to make mammogram appointment   Call Me back at [phone]  My name is Makayla   Or  call Central Scheduling [phone]

## 2024-06-26 ENCOUNTER — TELEMEDICINE (OUTPATIENT)
Age: 59
End: 2024-06-26
Payer: COMMERCIAL

## 2024-06-26 DIAGNOSIS — E66.01 SEVERE OBESITY (BMI 35.0-39.9) WITH COMORBIDITY (HCC): ICD-10-CM

## 2024-06-26 DIAGNOSIS — J06.9 UPPER RESPIRATORY TRACT INFECTION, UNSPECIFIED TYPE: Primary | ICD-10-CM

## 2024-06-26 PROCEDURE — 99213 OFFICE O/P EST LOW 20 MIN: CPT | Performed by: NURSE PRACTITIONER

## 2024-06-26 RX ORDER — AMOXICILLIN AND CLAVULANATE POTASSIUM 875; 125 MG/1; MG/1
1 TABLET, FILM COATED ORAL 2 TIMES DAILY
Qty: 14 TABLET | Refills: 0 | Status: SHIPPED | OUTPATIENT
Start: 2024-06-26 | End: 2024-07-03

## 2024-06-26 RX ORDER — BENZONATATE 200 MG/1
200 CAPSULE ORAL 3 TIMES DAILY PRN
Qty: 30 CAPSULE | Refills: 0 | Status: SHIPPED | OUTPATIENT
Start: 2024-06-26 | End: 2024-07-03

## 2024-06-26 SDOH — ECONOMIC STABILITY: FOOD INSECURITY: WITHIN THE PAST 12 MONTHS, YOU WORRIED THAT YOUR FOOD WOULD RUN OUT BEFORE YOU GOT MONEY TO BUY MORE.: NEVER TRUE

## 2024-06-26 SDOH — ECONOMIC STABILITY: FOOD INSECURITY: WITHIN THE PAST 12 MONTHS, THE FOOD YOU BOUGHT JUST DIDN'T LAST AND YOU DIDN'T HAVE MONEY TO GET MORE.: NEVER TRUE

## 2024-06-26 SDOH — ECONOMIC STABILITY: INCOME INSECURITY: HOW HARD IS IT FOR YOU TO PAY FOR THE VERY BASICS LIKE FOOD, HOUSING, MEDICAL CARE, AND HEATING?: NOT HARD AT ALL

## 2024-06-26 ASSESSMENT — ENCOUNTER SYMPTOMS
GASTROINTESTINAL NEGATIVE: 1
SHORTNESS OF BREATH: 0
COUGH: 1
WHEEZING: 0
SORE THROAT: 0
TROUBLE SWALLOWING: 0
CHOKING: 1

## 2024-06-26 ASSESSMENT — PATIENT HEALTH QUESTIONNAIRE - PHQ9
SUM OF ALL RESPONSES TO PHQ QUESTIONS 1-9: 0
SUM OF ALL RESPONSES TO PHQ9 QUESTIONS 1 & 2: 0
1. LITTLE INTEREST OR PLEASURE IN DOING THINGS: NOT AT ALL

## 2024-06-26 NOTE — PROGRESS NOTES
[facility]  Virtual Clinic Note    Roseanne Fulton (:  1965) is a Established patient, presenting virtually for evaluation of the following:      ASSESSMENT & PLAN:    1. Upper respiratory tract infection, unspecified type  -     amoxicillin-clavulanate (AUGMENTIN) 875-125 MG per tablet; Take 1 tablet by mouth 2 times daily for 7 days, Disp-14 tablet, R-0Normal  -     benzonatate (TESSALON) 200 MG capsule; Take 1 capsule by mouth 3 times daily as needed for Cough, Disp-30 capsule, R-0Normal  -  Symptomatic therapy suggested: mucinex 600-1200mg twice daily, tylenol or advil as directed on bottle.  Increase fluids.    2. Severe obesity (BMI 35.0-39.9) with comorbidity (HCC)  - followed by PCP        Return if symptoms worsen or fail to improve.           SUBJECTIVE:    Roseanne Fulton is seen today for   Chief Complaint   Patient presents with    Cough    Congestion     Started 6/5 with nasal congestion and has since progressed. Now with chest congestion and cough. Describes choking sensation and wet productive cough w/ green mucous.   Endorses being around her daughter with a sinus infection. Tried Dayquil and Nyquil.  No recent travel.         REVIEW OF SYSYEMS:  Review of Systems   Constitutional:  Positive for fatigue. Negative for chills and fever.   HENT:  Positive for congestion. Negative for sore throat and trouble swallowing.    Respiratory:  Positive for cough and choking. Negative for shortness of breath and wheezing.    Cardiovascular: Negative.    Gastrointestinal: Negative.    Genitourinary: Negative.    Musculoskeletal: Negative.    Neurological: Negative.             OBJECTIVE:    Patient-Reported Vitals  No data recorded     Physical Exam  [INSTRUCTIONS:  \"[x]\" Indicates a positive item  \"[]\" Indicates a negative item  -- DELETE ALL ITEMS NOT EXAMINED]    Constitutional: [x] Appears well-developed and well-nourished [x] No apparent distress      [] Abnormal -     Mental status:

## 2025-01-22 DIAGNOSIS — K59.09 CHRONIC CONSTIPATION: ICD-10-CM

## 2025-01-22 RX ORDER — POLYETHYLENE GLYCOL 3350 17 G/17G
POWDER, FOR SOLUTION ORAL
Qty: 510 G | Status: SHIPPED | OUTPATIENT
Start: 2025-01-22

## 2025-01-22 NOTE — TELEPHONE ENCOUNTER
PCP: Faviola Hunter MD    Last appt: 6/26/2024       No future appointments.    Requested Prescriptions     Pending Prescriptions Disp Refills    polyethylene glycol (GLYCOLAX) 17 GM/SCOOP powder [Pharmacy Med Name: POLYETH GLYCOL 3350 NF POWDER 510GM] 510 g PRN     Sig: MIX AND DRINK 17 GM EVERY DAY       Prior labs and Blood pressures:  BP Readings from Last 3 Encounters:   02/26/24 (!) 152/83   01/15/24 (!) 134/92   08/01/23 138/85     Lab Results   Component Value Date/Time     01/15/2024 12:00 AM    K 4.6 01/15/2024 12:00 AM     01/15/2024 12:00 AM    CO2 26 01/15/2024 12:00 AM    BUN 12 01/15/2024 12:00 AM    GFRAA 94 01/04/2021 12:00 AM     No results found for: \"HBA1C\", \"[id number]\"  Lab Results   Component Value Date/Time    CHOL 232 01/15/2024 12:00 AM    CHOL 241 05/19/2022 09:48 AM    HDL 65 01/15/2024 12:00 AM     01/15/2024 12:00 AM    VLDL 29 01/15/2024 12:00 AM    VLDL 35 05/19/2022 09:48 AM     No results found for: \"VITD3\"    Lab Results   Component Value Date/Time    TSH 1.250 01/15/2024 12:00 AM

## 2025-08-20 ENCOUNTER — OFFICE VISIT (OUTPATIENT)
Age: 60
End: 2025-08-20

## 2025-08-20 VITALS
TEMPERATURE: 97.8 F | HEART RATE: 65 BPM | RESPIRATION RATE: 16 BRPM | OXYGEN SATURATION: 98 % | SYSTOLIC BLOOD PRESSURE: 162 MMHG | BODY MASS INDEX: 37 KG/M2 | WEIGHT: 249.8 LBS | HEIGHT: 69 IN | DIASTOLIC BLOOD PRESSURE: 96 MMHG

## 2025-08-20 DIAGNOSIS — Z12.11 SCREENING FOR COLORECTAL CANCER: ICD-10-CM

## 2025-08-20 DIAGNOSIS — I10 UNCONTROLLED HYPERTENSION: ICD-10-CM

## 2025-08-20 DIAGNOSIS — Z12.31 SCREENING MAMMOGRAM FOR BREAST CANCER: ICD-10-CM

## 2025-08-20 DIAGNOSIS — E11.9 DIABETES MELLITUS TYPE 2, NONINSULIN DEPENDENT (HCC): ICD-10-CM

## 2025-08-20 DIAGNOSIS — Z53.20 PATIENT REFUSES TO TAKE MEDICATION: ICD-10-CM

## 2025-08-20 DIAGNOSIS — K59.09 CHRONIC CONSTIPATION: ICD-10-CM

## 2025-08-20 DIAGNOSIS — Z00.00 ANNUAL PHYSICAL EXAM: Primary | ICD-10-CM

## 2025-08-20 DIAGNOSIS — E78.2 MIXED HYPERLIPIDEMIA: ICD-10-CM

## 2025-08-20 DIAGNOSIS — Z91.199 NONCOMPLIANCE: ICD-10-CM

## 2025-08-20 DIAGNOSIS — Z12.12 SCREENING FOR COLORECTAL CANCER: ICD-10-CM

## 2025-08-20 PROCEDURE — 99396 PREV VISIT EST AGE 40-64: CPT | Performed by: FAMILY MEDICINE

## 2025-08-20 PROCEDURE — 3077F SYST BP >= 140 MM HG: CPT | Performed by: FAMILY MEDICINE

## 2025-08-20 PROCEDURE — 3080F DIAST BP >= 90 MM HG: CPT | Performed by: FAMILY MEDICINE

## 2025-08-20 RX ORDER — POLYETHYLENE GLYCOL 3350 17 G/17G
POWDER, FOR SOLUTION ORAL
Qty: 510 G | Status: SHIPPED | OUTPATIENT
Start: 2025-08-20

## 2025-08-20 SDOH — ECONOMIC STABILITY: FOOD INSECURITY: WITHIN THE PAST 12 MONTHS, THE FOOD YOU BOUGHT JUST DIDN'T LAST AND YOU DIDN'T HAVE MONEY TO GET MORE.: NEVER TRUE

## 2025-08-20 SDOH — ECONOMIC STABILITY: FOOD INSECURITY: WITHIN THE PAST 12 MONTHS, YOU WORRIED THAT YOUR FOOD WOULD RUN OUT BEFORE YOU GOT MONEY TO BUY MORE.: NEVER TRUE

## 2025-08-20 ASSESSMENT — ENCOUNTER SYMPTOMS
RESPIRATORY NEGATIVE: 1
VOMITING: 0
NAUSEA: 0
BLOOD IN STOOL: 0
DIARRHEA: 0

## 2025-08-20 ASSESSMENT — PATIENT HEALTH QUESTIONNAIRE - PHQ9
SUM OF ALL RESPONSES TO PHQ QUESTIONS 1-9: 2
1. LITTLE INTEREST OR PLEASURE IN DOING THINGS: SEVERAL DAYS
SUM OF ALL RESPONSES TO PHQ QUESTIONS 1-9: 2
2. FEELING DOWN, DEPRESSED OR HOPELESS: SEVERAL DAYS
SUM OF ALL RESPONSES TO PHQ QUESTIONS 1-9: 2
SUM OF ALL RESPONSES TO PHQ QUESTIONS 1-9: 2

## 2025-08-21 LAB
ALBUMIN SERPL-MCNC: 4.5 G/DL (ref 3.9–4.9)
ALBUMIN/CREAT UR: <6 MG/G CREAT (ref 0–29)
ALP SERPL-CCNC: 77 IU/L (ref 44–121)
ALT SERPL-CCNC: 34 IU/L (ref 0–32)
AST SERPL-CCNC: 23 IU/L (ref 0–40)
BILIRUB SERPL-MCNC: 1.7 MG/DL (ref 0–1.2)
BUN SERPL-MCNC: 16 MG/DL (ref 8–27)
BUN/CREAT SERPL: 18 (ref 12–28)
CALCIUM SERPL-MCNC: 9.9 MG/DL (ref 8.7–10.3)
CHLORIDE SERPL-SCNC: 98 MMOL/L (ref 96–106)
CHOLEST SERPL-MCNC: 245 MG/DL (ref 100–199)
CO2 SERPL-SCNC: 22 MMOL/L (ref 20–29)
CREAT SERPL-MCNC: 0.89 MG/DL (ref 0.57–1)
CREAT UR-MCNC: 48.6 MG/DL
EGFRCR SERPLBLD CKD-EPI 2021: 74 ML/MIN/1.73
ERYTHROCYTE [DISTWIDTH] IN BLOOD BY AUTOMATED COUNT: 14.4 % (ref 11.7–15.4)
EST. AVERAGE GLUCOSE BLD GHB EST-MCNC: 160 MG/DL
GLOBULIN SER CALC-MCNC: 2.5 G/DL (ref 1.5–4.5)
GLUCOSE SERPL-MCNC: 100 MG/DL (ref 70–99)
HBA1C MFR BLD: 7.2 % (ref 4.8–5.6)
HCT VFR BLD AUTO: 50.2 % (ref 34–46.6)
HDLC SERPL-MCNC: 58 MG/DL
HGB BLD-MCNC: 16.1 G/DL (ref 11.1–15.9)
IMP & REVIEW OF LAB RESULTS: NORMAL
LDLC SERPL CALC-MCNC: 155 MG/DL (ref 0–99)
MCH RBC QN AUTO: 28.9 PG (ref 26.6–33)
MCHC RBC AUTO-ENTMCNC: 32.1 G/DL (ref 31.5–35.7)
MCV RBC AUTO: 90 FL (ref 79–97)
MICROALBUMIN UR-MCNC: <3 UG/ML
PLATELET # BLD AUTO: 285 X10E3/UL (ref 150–450)
POTASSIUM SERPL-SCNC: 4 MMOL/L (ref 3.5–5.2)
PROT SERPL-MCNC: 7 G/DL (ref 6–8.5)
RBC # BLD AUTO: 5.58 X10E6/UL (ref 3.77–5.28)
SODIUM SERPL-SCNC: 138 MMOL/L (ref 134–144)
TRIGL SERPL-MCNC: 179 MG/DL (ref 0–149)
TSH SERPL DL<=0.005 MIU/L-ACNC: 1.27 UIU/ML (ref 0.45–4.5)
VLDLC SERPL CALC-MCNC: 32 MG/DL (ref 5–40)
WBC # BLD AUTO: 8.9 X10E3/UL (ref 3.4–10.8)

## 2025-08-22 ENCOUNTER — TELEPHONE (OUTPATIENT)
Age: 60
End: 2025-08-22

## 2025-08-24 ENCOUNTER — RESULTS FOLLOW-UP (OUTPATIENT)
Age: 60
End: 2025-08-24